# Patient Record
Sex: FEMALE | Race: BLACK OR AFRICAN AMERICAN | NOT HISPANIC OR LATINO | Employment: FULL TIME | URBAN - METROPOLITAN AREA
[De-identification: names, ages, dates, MRNs, and addresses within clinical notes are randomized per-mention and may not be internally consistent; named-entity substitution may affect disease eponyms.]

---

## 2017-02-04 RX ORDER — MELOXICAM 7.5 MG/1
TABLET ORAL
Qty: 60 TABLET | Refills: 0 | Status: SHIPPED | OUTPATIENT
Start: 2017-02-04 | End: 2017-06-01 | Stop reason: SDUPTHER

## 2017-02-04 RX ORDER — CYPROHEPTADINE HYDROCHLORIDE 4 MG/1
TABLET ORAL
Qty: 60 TABLET | Refills: 0 | Status: SHIPPED | OUTPATIENT
Start: 2017-02-04 | End: 2017-09-06 | Stop reason: SDUPTHER

## 2017-02-06 DIAGNOSIS — J31.0 RHINITIS: ICD-10-CM

## 2017-02-06 RX ORDER — AZELASTINE 1 MG/ML
1 SPRAY, METERED NASAL 2 TIMES DAILY
Qty: 30 ML | Refills: 11 | Status: SHIPPED | OUTPATIENT
Start: 2017-02-06 | End: 2020-07-18

## 2017-02-06 RX ORDER — MELOXICAM 7.5 MG/1
TABLET ORAL
Qty: 60 TABLET | Refills: 1 | Status: CANCELLED | OUTPATIENT
Start: 2017-02-06

## 2017-02-06 RX ORDER — CYPROHEPTADINE HYDROCHLORIDE 4 MG/1
4 TABLET ORAL 2 TIMES DAILY PRN
Qty: 60 TABLET | Refills: 2 | Status: CANCELLED | OUTPATIENT
Start: 2017-02-06

## 2017-02-07 DIAGNOSIS — Z12.31 OTHER SCREENING MAMMOGRAM: ICD-10-CM

## 2017-05-29 ENCOUNTER — OFFICE VISIT (OUTPATIENT)
Dept: INTERNAL MEDICINE | Facility: CLINIC | Age: 52
End: 2017-05-29
Payer: COMMERCIAL

## 2017-05-29 ENCOUNTER — HOSPITAL ENCOUNTER (OUTPATIENT)
Dept: RADIOLOGY | Facility: HOSPITAL | Age: 52
Discharge: HOME | End: 2017-05-29
Attending: INTERNAL MEDICINE
Payer: COMMERCIAL

## 2017-05-29 VITALS
WEIGHT: 157 LBS | SYSTOLIC BLOOD PRESSURE: 104 MMHG | DIASTOLIC BLOOD PRESSURE: 80 MMHG | HEIGHT: 60 IN | BODY MASS INDEX: 30.82 KG/M2 | HEART RATE: 69 BPM | OXYGEN SATURATION: 99 %

## 2017-05-29 DIAGNOSIS — M25.562 ACUTE PAIN OF BOTH KNEES: ICD-10-CM

## 2017-05-29 DIAGNOSIS — M79.641 PAIN IN BOTH HANDS: ICD-10-CM

## 2017-05-29 DIAGNOSIS — M79.642 PAIN IN BOTH HANDS: ICD-10-CM

## 2017-05-29 DIAGNOSIS — Z00.00 ANNUAL PHYSICAL EXAM: Primary | ICD-10-CM

## 2017-05-29 DIAGNOSIS — Z12.39 BREAST SCREENING: ICD-10-CM

## 2017-05-29 DIAGNOSIS — M25.521 RIGHT ELBOW PAIN: ICD-10-CM

## 2017-05-29 DIAGNOSIS — M25.561 ACUTE PAIN OF BOTH KNEES: ICD-10-CM

## 2017-05-29 DIAGNOSIS — M25.512 ACUTE PAIN OF LEFT SHOULDER: ICD-10-CM

## 2017-05-29 DIAGNOSIS — G43.909 MIGRAINE WITHOUT STATUS MIGRAINOSUS, NOT INTRACTABLE, UNSPECIFIED MIGRAINE TYPE: ICD-10-CM

## 2017-05-29 PROCEDURE — 73070 X-RAY EXAM OF ELBOW: CPT | Mod: TC,RT

## 2017-05-29 PROCEDURE — 99396 PREV VISIT EST AGE 40-64: CPT | Mod: S$GLB,,, | Performed by: INTERNAL MEDICINE

## 2017-05-29 PROCEDURE — 73564 X-RAY EXAM KNEE 4 OR MORE: CPT | Mod: 50,TC

## 2017-05-29 PROCEDURE — 73130 X-RAY EXAM OF HAND: CPT | Mod: 50,TC

## 2017-05-29 PROCEDURE — 73564 X-RAY EXAM KNEE 4 OR MORE: CPT | Mod: 26,50,, | Performed by: RADIOLOGY

## 2017-05-29 PROCEDURE — 73030 X-RAY EXAM OF SHOULDER: CPT | Mod: TC,LT

## 2017-05-29 PROCEDURE — 73030 X-RAY EXAM OF SHOULDER: CPT | Mod: 26,LT,, | Performed by: RADIOLOGY

## 2017-05-29 PROCEDURE — 99999 PR PBB SHADOW E&M-EST. PATIENT-LVL V: CPT | Mod: PBBFAC,,, | Performed by: INTERNAL MEDICINE

## 2017-05-29 PROCEDURE — 73130 X-RAY EXAM OF HAND: CPT | Mod: 26,50,, | Performed by: RADIOLOGY

## 2017-05-29 PROCEDURE — 73070 X-RAY EXAM OF ELBOW: CPT | Mod: 26,RT,, | Performed by: RADIOLOGY

## 2017-05-29 RX ORDER — SUMATRIPTAN SUCCINATE 100 MG/1
TABLET ORAL
Qty: 9 TABLET | Refills: 5 | Status: SHIPPED | OUTPATIENT
Start: 2017-05-29 | End: 2017-06-06

## 2017-05-29 NOTE — PROGRESS NOTES
Subjective:       Patient ID: Jeannie Saavedra is a 51 y.o. female.    Chief Complaint: Annual Exam    HPI   C/o pain hands, knees, L shoulder, R elbow.  Hands began 1 mo ago, progressive.  Hand pain may awaken her from sleep.  Knee pain intermittent, worse walking up steps, like they might give out.  No swelling.      Recurrent migraines x1 week.  Before 1 week ago, had occurred infrequently.  Some deaths recently.  Had migraine yday.  Goodies and excedrin migraine help a little.  Maxalt ineffective.    Assoc with nausea, light sensitivity.  Not in pain now.  Sleep helps.   Pain ins bifrontal or at temples.  May have scotomata.    Also with bruise inner r thigh.  Review of Systems   Constitutional: Negative for fever and unexpected weight change.   HENT: Negative for congestion and postnasal drip.    Respiratory: Negative for chest tightness, shortness of breath and wheezing.    Cardiovascular: Negative for chest pain and leg swelling.   Gastrointestinal: Negative for abdominal pain, anal bleeding, constipation, diarrhea, nausea and vomiting.   Genitourinary: Negative for dysuria and urgency.   Musculoskeletal: Positive for arthralgias and back pain. Negative for joint swelling.   Skin: Negative for rash.   Neurological: Negative for headaches.   Psychiatric/Behavioral: Negative for dysphoric mood and sleep disturbance. The patient is not nervous/anxious.        Objective:      Physical Exam   Constitutional: She is oriented to person, place, and time. She appears well-developed and well-nourished. No distress.   HENT:   Head: Normocephalic and atraumatic.   Right Ear: External ear normal.   Left Ear: External ear normal.   Nose: Nose normal.   Mouth/Throat: Oropharynx is clear and moist.   Eyes: Conjunctivae and EOM are normal. Pupils are equal, round, and reactive to light. Right eye exhibits no discharge. Left eye exhibits no discharge. No scleral icterus.   Neck: Normal range of motion. Neck supple. No JVD  present. No thyromegaly present.   Cardiovascular: Normal rate, regular rhythm and normal heart sounds.  Exam reveals no gallop.    No murmur heard.  Pulmonary/Chest: Effort normal and breath sounds normal. No respiratory distress. She has no wheezes. She has no rales.   Abdominal: Soft. Bowel sounds are normal. She exhibits no distension and no mass. There is no tenderness. There is no rebound and no guarding.   Genitourinary: No breast tenderness, discharge or bleeding.   Musculoskeletal: Normal range of motion. She exhibits no edema or tenderness.        Right elbow: Normal.       Right knee: Normal.        Left knee: Normal.   Hands - no effusion, warmth, tenderness   Lymphadenopathy:     She has no cervical adenopathy.   Neurological: She is alert and oriented to person, place, and time. No cranial nerve deficit. Coordination normal.   Skin: Skin is warm and dry. No rash noted.   Psychiatric: She has a normal mood and affect. Her behavior is normal. Judgment and thought content normal.         Assessment:       1. Annual physical exam    2. Pain in both hands    3. Acute pain of both knees    4. Migraine without status migrainosus, not intractable, unspecified migraine type    5. Breast screening    6. Acute pain of left shoulder    7. Right elbow pain        Plan:       Jeannie was seen today for annual exam.    Diagnoses and all orders for this visit:    Annual physical exam  -     CBC auto differential; Future  -     Comprehensive metabolic panel; Future  -     Lipid panel; Future    Pain in both hands  -     X-Ray Hand 3 view Left; Future  -     X-Ray Hand 3 view Right; Future  -     Sedimentation rate, manual; Future  -     Rheumatoid factor; Future  -     C-reactive protein; Future  -     BRIGHT; Future    Acute pain of both knees  -     X-Ray Knee Complete 4 Or More Views Bilat; Future    Migraine without status migrainosus, not intractable, unspecified migraine type  -     sumatriptan (IMITREX) 100 MG  tablet; No more than twice in a 24 hour period    Breast screening    Acute pain of left shoulder  -     X-Ray Shoulder Trauma 3 view Left; Future    Right elbow pain  -     X-Ray Elbow 2 Views Right; Future       wt loss     Call if headaches no better with imitrex

## 2017-05-30 ENCOUNTER — PATIENT MESSAGE (OUTPATIENT)
Dept: INTERNAL MEDICINE | Facility: CLINIC | Age: 52
End: 2017-05-30

## 2017-05-31 RX ORDER — NORTRIPTYLINE HYDROCHLORIDE 10 MG/1
CAPSULE ORAL
Qty: 60 CAPSULE | Refills: 3 | Status: SHIPPED | OUTPATIENT
Start: 2017-05-31 | End: 2017-06-27

## 2017-06-01 ENCOUNTER — PATIENT MESSAGE (OUTPATIENT)
Dept: INTERNAL MEDICINE | Facility: CLINIC | Age: 52
End: 2017-06-01

## 2017-06-01 RX ORDER — MELOXICAM 7.5 MG/1
TABLET ORAL
Qty: 60 TABLET | Refills: 5 | Status: SHIPPED | OUTPATIENT
Start: 2017-06-01 | End: 2018-04-23

## 2017-06-02 ENCOUNTER — PATIENT MESSAGE (OUTPATIENT)
Dept: INTERNAL MEDICINE | Facility: CLINIC | Age: 52
End: 2017-06-02

## 2017-06-06 ENCOUNTER — TELEPHONE (OUTPATIENT)
Dept: INTERNAL MEDICINE | Facility: CLINIC | Age: 52
End: 2017-06-06

## 2017-06-06 ENCOUNTER — PATIENT MESSAGE (OUTPATIENT)
Dept: INTERNAL MEDICINE | Facility: CLINIC | Age: 52
End: 2017-06-06

## 2017-06-06 DIAGNOSIS — M25.50 ARTHRALGIA, UNSPECIFIED JOINT: Primary | ICD-10-CM

## 2017-06-06 DIAGNOSIS — M79.642 PAIN IN BOTH HANDS: Primary | ICD-10-CM

## 2017-06-06 DIAGNOSIS — G43.709 CHRONIC MIGRAINE WITHOUT AURA WITHOUT STATUS MIGRAINOSUS, NOT INTRACTABLE: Primary | ICD-10-CM

## 2017-06-06 DIAGNOSIS — M79.641 PAIN IN BOTH HANDS: Primary | ICD-10-CM

## 2017-06-06 RX ORDER — ELETRIPTAN HYDROBROMIDE 40 MG/1
40 TABLET, FILM COATED ORAL
Qty: 9 TABLET | Refills: 2 | Status: SHIPPED | OUTPATIENT
Start: 2017-06-06 | End: 2017-06-07 | Stop reason: SDUPTHER

## 2017-06-06 RX ORDER — ELETRIPTAN HYDROBROMIDE 40 MG/1
40 TABLET, FILM COATED ORAL
Qty: 9 TABLET | Refills: 2 | Status: SHIPPED | OUTPATIENT
Start: 2017-06-06 | End: 2017-06-06 | Stop reason: SDUPTHER

## 2017-06-06 NOTE — TELEPHONE ENCOUNTER
Left the pt a message stating that the provider has called in a Rx to her pharmacy and placed a referral in for neurology and rheumatology along with the numbers to call and schedule her appt.

## 2017-06-06 NOTE — TELEPHONE ENCOUNTER
----- Message from Anne Marie Bang sent at 6/6/2017  4:02 PM CDT -----  Contact: self/[phone]/cell  Patient is returning a phone call.  Who left a message for the patient: Joselo   Does patient know what this is regarding: No   Comments: Please call and advise.         Thank you!!!

## 2017-06-06 NOTE — TELEPHONE ENCOUNTER
pls see the MO message I just sent pt and go over the questions in that message  If nortriptyline not helpful can take topamax as a PREVENTATIVE.    Will martin headley relpax to take for pain when migraines occur    Needs rheum and neuro referrals    pls make sure i have addressed all her concerns

## 2017-06-06 NOTE — TELEPHONE ENCOUNTER
----- Message from Anne Marie Bang sent at 6/6/2017  4:02 PM CDT -----  Contact: self/[phone]/cell  Patient is returning a phone call.  Who left a message for the patient: Joselo   Does patient know what this is regarding: No   Comments: Please call and advise.         Thank you!!!

## 2017-06-07 RX ORDER — ELETRIPTAN HYDROBROMIDE 40 MG/1
TABLET, FILM COATED ORAL
Qty: 9 TABLET | Refills: 2 | Status: SHIPPED | OUTPATIENT
Start: 2017-06-07 | End: 2017-06-30

## 2017-06-07 NOTE — TELEPHONE ENCOUNTER
Discussed with pt:    Joselo Noriega pls make appt in orthoo for hand pain    She will try nortriptyline for hand pain    Will send erx for relpax, as pharmacy didn't receive initial rx.

## 2017-06-07 NOTE — TELEPHONE ENCOUNTER
----- Message from Halley Cross sent at 6/6/2017  7:48 PM CDT -----  Contact: self  Good evening    Pt would like a call back regarding two medications the doctor prescribed for her. Pt stated the medications are not at the pharmacy.     Pt can be reached at [phone]    Thank you

## 2017-06-08 ENCOUNTER — TELEPHONE (OUTPATIENT)
Dept: INTERNAL MEDICINE | Facility: CLINIC | Age: 52
End: 2017-06-08

## 2017-06-08 NOTE — TELEPHONE ENCOUNTER
Left the pt a message stating that a referral for orthopedic has been placed into the system and to call and schedule her appt.

## 2017-06-15 ENCOUNTER — TELEPHONE (OUTPATIENT)
Dept: INTERNAL MEDICINE | Facility: CLINIC | Age: 52
End: 2017-06-15

## 2017-06-27 ENCOUNTER — PATIENT MESSAGE (OUTPATIENT)
Dept: INTERNAL MEDICINE | Facility: CLINIC | Age: 52
End: 2017-06-27

## 2017-06-27 RX ORDER — TOPIRAMATE 25 MG/1
TABLET ORAL
Qty: 60 TABLET | Refills: 11 | Status: SHIPPED | OUTPATIENT
Start: 2017-06-27 | End: 2018-04-23

## 2017-06-30 ENCOUNTER — TELEPHONE (OUTPATIENT)
Dept: INTERNAL MEDICINE | Facility: CLINIC | Age: 52
End: 2017-06-30

## 2017-06-30 RX ORDER — NARATRIPTAN 2.5 MG/1
TABLET ORAL
Qty: 9 TABLET | Refills: 5 | Status: SHIPPED | OUTPATIENT
Start: 2017-06-30 | End: 2018-04-23

## 2017-06-30 NOTE — TELEPHONE ENCOUNTER
pls call - I there is a med called Amerge, in same class as Imitrex, that her insurance will cover, to take to treat acute migraine.    I will send in rx.  Let me know if not helpful.  She may take 1tab at start of migraine, and may repeat once in 2h if necessary.      Also -  wanted her to see neurology but appt never made - pls help her schedule

## 2017-06-30 NOTE — TELEPHONE ENCOUNTER
----- Message from Darlin Lima, PharmD sent at 6/28/2017  9:53 AM CDT -----  Good morning,    Sumatriptan and naratriptan are both $10 under the insurance (for 9 tablets). Zomig and rizatriptan require prior authorization. I hope this helps. Please let me know if you need anything else.    Thanks,  Anuj      ----- Message -----  From: Jannette Salguero MD  Sent: 6/27/2017   4:08 PM  To: Darlin Lima, PharmD, Ambar Tena - could you see if there are any cheaper alternatives for a Triptan than Relpax for this pt. - it costs $100.  Imitrx wasn't helpful.             Thanks   NE -

## 2017-07-05 ENCOUNTER — PATIENT MESSAGE (OUTPATIENT)
Dept: INTERNAL MEDICINE | Facility: CLINIC | Age: 52
End: 2017-07-05

## 2017-08-10 ENCOUNTER — PATIENT MESSAGE (OUTPATIENT)
Dept: INTERNAL MEDICINE | Facility: CLINIC | Age: 52
End: 2017-08-10

## 2017-08-10 RX ORDER — HYDROCODONE BITARTRATE AND ACETAMINOPHEN 5; 325 MG/1; MG/1
TABLET ORAL
Qty: 30 TABLET | Refills: 0 | Status: SHIPPED | OUTPATIENT
Start: 2017-08-10 | End: 2017-10-06 | Stop reason: SDUPTHER

## 2017-08-15 NOTE — TELEPHONE ENCOUNTER
pls call - I sent in rx for Hydrocodone august 10 - I thought I sent her an email but I may have accidentally cancelled it.   Did she  rx?

## 2017-09-05 ENCOUNTER — TELEPHONE (OUTPATIENT)
Dept: RHEUMATOLOGY | Facility: CLINIC | Age: 52
End: 2017-09-05

## 2017-09-06 ENCOUNTER — TELEPHONE (OUTPATIENT)
Dept: RHEUMATOLOGY | Facility: CLINIC | Age: 52
End: 2017-09-06

## 2017-09-06 ENCOUNTER — PATIENT MESSAGE (OUTPATIENT)
Dept: RHEUMATOLOGY | Facility: CLINIC | Age: 52
End: 2017-09-06

## 2017-09-06 ENCOUNTER — LAB VISIT (OUTPATIENT)
Dept: LAB | Facility: HOSPITAL | Age: 52
End: 2017-09-06
Attending: INTERNAL MEDICINE
Payer: COMMERCIAL

## 2017-09-06 ENCOUNTER — INITIAL CONSULT (OUTPATIENT)
Dept: RHEUMATOLOGY | Facility: CLINIC | Age: 52
End: 2017-09-06
Payer: COMMERCIAL

## 2017-09-06 VITALS
DIASTOLIC BLOOD PRESSURE: 74 MMHG | SYSTOLIC BLOOD PRESSURE: 120 MMHG | HEIGHT: 60 IN | WEIGHT: 163.56 LBS | HEART RATE: 70 BPM | RESPIRATION RATE: 18 BRPM | BODY MASS INDEX: 32.11 KG/M2

## 2017-09-06 DIAGNOSIS — M25.50 POLYARTHRALGIA: ICD-10-CM

## 2017-09-06 DIAGNOSIS — M25.50 POLYARTHRALGIA: Primary | ICD-10-CM

## 2017-09-06 DIAGNOSIS — R22.31 LOCALIZED SWELLING ON RIGHT HAND: ICD-10-CM

## 2017-09-06 LAB
BASOPHILS # BLD AUTO: 0.01 K/UL
BASOPHILS NFR BLD: 0.2 %
CRP SERPL-MCNC: 2.9 MG/L
DIFFERENTIAL METHOD: ABNORMAL
EOSINOPHIL # BLD AUTO: 0.1 K/UL
EOSINOPHIL NFR BLD: 1.2 %
ERYTHROCYTE [DISTWIDTH] IN BLOOD BY AUTOMATED COUNT: 13.2 %
ERYTHROCYTE [SEDIMENTATION RATE] IN BLOOD BY WESTERGREN METHOD: 13 MM/HR
HCT VFR BLD AUTO: 36.1 %
HGB BLD-MCNC: 11.8 G/DL
LYMPHOCYTES # BLD AUTO: 1.9 K/UL
LYMPHOCYTES NFR BLD: 40.1 %
MCH RBC QN AUTO: 28.9 PG
MCHC RBC AUTO-ENTMCNC: 32.7 G/DL
MCV RBC AUTO: 88 FL
MONOCYTES # BLD AUTO: 0.4 K/UL
MONOCYTES NFR BLD: 7.7 %
NEUTROPHILS # BLD AUTO: 2.4 K/UL
NEUTROPHILS NFR BLD: 50.8 %
PLATELET # BLD AUTO: 302 K/UL
PMV BLD AUTO: 9.3 FL
RBC # BLD AUTO: 4.09 M/UL
URATE SERPL-MCNC: 2.6 MG/DL
WBC # BLD AUTO: 4.81 K/UL

## 2017-09-06 PROCEDURE — 85652 RBC SED RATE AUTOMATED: CPT

## 2017-09-06 PROCEDURE — 3008F BODY MASS INDEX DOCD: CPT | Mod: S$GLB,,, | Performed by: INTERNAL MEDICINE

## 2017-09-06 PROCEDURE — 86803 HEPATITIS C AB TEST: CPT

## 2017-09-06 PROCEDURE — 86706 HEP B SURFACE ANTIBODY: CPT

## 2017-09-06 PROCEDURE — 86140 C-REACTIVE PROTEIN: CPT

## 2017-09-06 PROCEDURE — 87340 HEPATITIS B SURFACE AG IA: CPT

## 2017-09-06 PROCEDURE — 36415 COLL VENOUS BLD VENIPUNCTURE: CPT

## 2017-09-06 PROCEDURE — 86200 CCP ANTIBODY: CPT

## 2017-09-06 PROCEDURE — 86705 HEP B CORE ANTIBODY IGM: CPT

## 2017-09-06 PROCEDURE — 85025 COMPLETE CBC W/AUTO DIFF WBC: CPT

## 2017-09-06 PROCEDURE — 99999 PR PBB SHADOW E&M-EST. PATIENT-LVL III: CPT | Mod: PBBFAC,,, | Performed by: INTERNAL MEDICINE

## 2017-09-06 PROCEDURE — 99204 OFFICE O/P NEW MOD 45 MIN: CPT | Mod: S$GLB,,, | Performed by: INTERNAL MEDICINE

## 2017-09-06 PROCEDURE — 86038 ANTINUCLEAR ANTIBODIES: CPT

## 2017-09-06 PROCEDURE — 84550 ASSAY OF BLOOD/URIC ACID: CPT

## 2017-09-06 RX ORDER — CYPROHEPTADINE HYDROCHLORIDE 4 MG/1
TABLET ORAL
Qty: 60 TABLET | Refills: 0 | Status: SHIPPED | OUTPATIENT
Start: 2017-09-06 | End: 2018-04-23 | Stop reason: SDUPTHER

## 2017-09-06 NOTE — PROGRESS NOTES
Subjective:       Patient ID: Jeannie Saavedra is a 51 y.o. female.    Chief Complaint: Disease Management    HPI  52 yo F PMH of asthma (mild), right CTS here for evaluations. Reports that she has pain in right hand, both knees.  Reports pain since may 2017. Thinks that her right hand may be getting swollen.  Reports that when she tries to open a jar. She has pain.  Reports that her right third finger may be getting swollen.  Reports that her pain is aching.  She also has numbness in right hand.  Reports that she has stiffness in right hand and left knee. Reports stiffness with prolonged sitting.  Pain level can be as high as 10/10 and aching.  Denies any history of smoking.  Denies any oral ulcers, hair loss, photosensitivity, or raynauds.  She had IM shot about 2 months ago and took prednisone pills and it took edge off.  She had steroid injection for CTS without significant improvement and that her CTS is moderate to severe.    Past Medical History:   Diagnosis Date    Allergy     Asthma     Keloid cicatrix     Nephrolithiasis     Osteoarthritis of lumbar spine     Sciatica        Review of Systems   Constitutional: Negative for activity change, appetite change, chills, diaphoresis and fatigue.   HENT: Negative for congestion, ear discharge, ear pain, facial swelling, mouth sores, sinus pressure, sneezing, sore throat, tinnitus and trouble swallowing.    Eyes: Negative for photophobia, pain, discharge, redness, itching and visual disturbance.   Respiratory: Negative for apnea, chest tightness, shortness of breath, wheezing and stridor.    Cardiovascular: Negative for leg swelling.   Gastrointestinal: Negative for abdominal distention, abdominal pain, anal bleeding, blood in stool, constipation, diarrhea and nausea.   Endocrine: Negative for cold intolerance and heat intolerance.   Genitourinary: Negative for difficulty urinating and dysuria.   Musculoskeletal: Positive for arthralgias and joint swelling.  Negative for back pain, gait problem, myalgias, neck pain and neck stiffness.   Skin: Negative for color change, pallor, rash and wound.   Neurological: Negative for dizziness, seizures, light-headedness and numbness.   Hematological: Negative for adenopathy. Does not bruise/bleed easily.   Psychiatric/Behavioral: Negative for sleep disturbance. The patient is not nervous/anxious.            Objective:   /74   Pulse 70   Resp 18   Ht 5' (1.524 m)   Wt 74.2 kg (163 lb 9.3 oz)   BMI 31.95 kg/m²      Physical Exam   Constitutional: She is oriented to person, place, and time.   HENT:   Head: Normocephalic and atraumatic.   Right Ear: External ear normal.   Left Ear: External ear normal.   Nose: Nose normal.   Mouth/Throat: Oropharynx is clear and moist. No oropharyngeal exudate.   Eyes: Conjunctivae and EOM are normal. Pupils are equal, round, and reactive to light. Right eye exhibits no discharge. Left eye exhibits no discharge. No scleral icterus.   Neck: Neck supple. No JVD present. No thyromegaly present.   Cardiovascular: Normal rate, regular rhythm, normal heart sounds and intact distal pulses.  Exam reveals no gallop and no friction rub.    No murmur heard.  Pulmonary/Chest: Effort normal and breath sounds normal. No respiratory distress. She has no wheezes. She has no rales. She exhibits no tenderness.   Abdominal: Soft. Bowel sounds are normal. She exhibits no distension and no mass. There is no tenderness. There is no rebound and no guarding.   Lymphadenopathy:     She has no cervical adenopathy.   Neurological: She is alert and oriented to person, place, and time. No cranial nerve deficit. Gait normal. Coordination normal.   Skin: Skin is dry. No rash noted. No erythema. No pallor.     Psychiatric: Affect and judgment normal.   Musculoskeletal: She exhibits tenderness. She exhibits no edema or deformity.   Right hand  mcp tenderness  Left knee with mild  Tenderness on extension           Labs:  reviewed    Hand xrays: I personally reviewed; no erosions  Assessment:       52 yo F PMH of asthma (mild), right CTS here for evaluation. Reports that she has pain in right hand, both knees.  I suspect that she may have gout vs RA but will do additional studies for confirmation.    No diagnosis found.        Plan:        right hand MRI  Labs  Encouraged weight loss  rtc in 10-12 weeks*

## 2017-09-06 NOTE — LETTER
September 6, 2017      Jannette Salguero MD  [address]  [facility] [address]           [facility]  [address] [place] [address] [address]  [facility] [address]  Phone: [phone]          Patient: Jeannie Saavedra   MR Number: [id number]   YOB: 1965   Date of Visit: 9/6/2017       Dear Dr. Jannette Salguero:    Thank you for referring Jeannie Saavedra to me for evaluation. Attached you will find relevant portions of my assessment and plan of care.    If you have questions, please do not hesitate to call me. I look forward to following Jeannie Saavedra along with you.    Sincerely,    Anne Marie Haynes MD    Enclosure  CC:  No Recipients    If you would like to receive this communication electronically, please contact [email or web address] or [phone] to request more information on [employer] Link access.    For providers and/or their staff who would like to refer a patient to [facility], please contact us through our one-stop-shop provider referral line, [facility], at [phone].    If you feel you have received this communication in error or would no longer like to receive these types of communications, please e-mail [email or web address]

## 2017-09-07 ENCOUNTER — TELEPHONE (OUTPATIENT)
Dept: RHEUMATOLOGY | Facility: CLINIC | Age: 52
End: 2017-09-07

## 2017-09-07 DIAGNOSIS — R22.31 LOCALIZED SWELLING ON RIGHT HAND: Primary | ICD-10-CM

## 2017-09-07 LAB
ANA SER QL IF: NORMAL
CCP AB SER IA-ACNC: 0.6 U/ML
HBV CORE IGM SERPL QL IA: NEGATIVE
HBV SURFACE AB SER-ACNC: NEGATIVE M[IU]/ML
HBV SURFACE AG SERPL QL IA: NEGATIVE
HCV AB SERPL QL IA: NEGATIVE

## 2017-09-07 RX ORDER — PREDNISONE 20 MG/1
20 TABLET ORAL DAILY
Qty: 10 TABLET | Refills: 0 | Status: SHIPPED | OUTPATIENT
Start: 2017-09-07 | End: 2017-09-17

## 2017-09-08 NOTE — TELEPHONE ENCOUNTER
----- Message from Carmen Acosta MA sent at 9/7/2017 11:15 AM CDT -----  Contact: Self [phone]  [facility]  ----- Message -----  From: Amber Herr  Sent: 9/7/2017   8:54 AM  To: Dallas Kenney Staff    Patient Returning Your Phone Call

## 2017-09-12 ENCOUNTER — TELEPHONE (OUTPATIENT)
Dept: RHEUMATOLOGY | Facility: CLINIC | Age: 52
End: 2017-09-12

## 2017-09-18 ENCOUNTER — TELEPHONE (OUTPATIENT)
Dept: RHEUMATOLOGY | Facility: CLINIC | Age: 52
End: 2017-09-18

## 2017-09-18 DIAGNOSIS — R22.31 LOCALIZED SWELLING ON RIGHT HAND: Primary | ICD-10-CM

## 2017-09-25 ENCOUNTER — PATIENT MESSAGE (OUTPATIENT)
Dept: RHEUMATOLOGY | Facility: CLINIC | Age: 52
End: 2017-09-25

## 2017-09-25 DIAGNOSIS — M25.562 LEFT ANTERIOR KNEE PAIN: Primary | ICD-10-CM

## 2017-10-06 ENCOUNTER — OFFICE VISIT (OUTPATIENT)
Dept: ORTHOPEDICS | Facility: CLINIC | Age: 52
End: 2017-10-06
Payer: COMMERCIAL

## 2017-10-06 ENCOUNTER — HOSPITAL ENCOUNTER (OUTPATIENT)
Dept: RADIOLOGY | Facility: HOSPITAL | Age: 52
Discharge: HOME | End: 2017-10-06
Attending: NURSE PRACTITIONER
Payer: COMMERCIAL

## 2017-10-06 VITALS — HEIGHT: 60 IN | BODY MASS INDEX: 32.11 KG/M2 | WEIGHT: 163.56 LBS

## 2017-10-06 DIAGNOSIS — M25.562 LEFT KNEE PAIN, UNSPECIFIED CHRONICITY: Primary | ICD-10-CM

## 2017-10-06 DIAGNOSIS — M25.562 ACUTE PAIN OF LEFT KNEE: Primary | ICD-10-CM

## 2017-10-06 DIAGNOSIS — M25.562 LEFT KNEE PAIN, UNSPECIFIED CHRONICITY: ICD-10-CM

## 2017-10-06 PROCEDURE — 99999 PR PBB SHADOW E&M-EST. PATIENT-LVL III: CPT | Mod: PBBFAC,,, | Performed by: NURSE PRACTITIONER

## 2017-10-06 PROCEDURE — 99203 OFFICE O/P NEW LOW 30 MIN: CPT | Mod: S$GLB,,, | Performed by: NURSE PRACTITIONER

## 2017-10-06 RX ORDER — HYDROCODONE BITARTRATE AND ACETAMINOPHEN 5; 325 MG/1; MG/1
TABLET ORAL
Qty: 30 TABLET | Refills: 0 | Status: SHIPPED | OUTPATIENT
Start: 2017-10-06 | End: 2018-04-23

## 2017-10-06 NOTE — LETTER
October 8, 2017      Anne Marie Haynes MD  [address]  [facility] [address]           [facility]  [address]  [facility] [address]  Phone: [phone]          Patient: Jeannie Saavedra   MR Number: [id number]   YOB: 1965   Date of Visit: 10/6/2017       Dear Dr. Anne Marie Haynes:    Thank you for referring Jeannie Saavedra to me for evaluation. Attached you will find relevant portions of my assessment and plan of care.    If you have questions, please do not hesitate to call me. I look forward to following Jeannie Saavedra along with you.    Sincerely,    Paola Field NP    Enclosure  CC:  No Recipients    If you would like to receive this communication electronically, please contact [email or web address] or [phone] to request more information on [employer] Link access.    For providers and/or their staff who would like to refer a patient to [facility], please contact us through our one-stop-shop provider referral line, [facility] , at [phone].    If you feel you have received this communication in error or would no longer like to receive these types of communications, please e-mail [email or web address]

## 2017-10-08 NOTE — PROGRESS NOTES
CC: Pain of the Left Knee      HPI: Pt with c/o bilateral knee pain left>right for the past several months. The pain started after she started exercising. The pain is worse with bending her knee in bed at night. There is stiffness and pulling. 3 months ago she went to urgent care and was given a cortisone injection in the knee. The relief only lasted for few days. She has also tried aleve, advil, mobic, naprosyn 500, and Norco without relief. She was seen by her pcp and sent to rheumatology for polyarthralgia, but since all of her bloodwork was negative for rheumatologic cause she was referred to orthopedics. She also c/o carpal tunnel/hand pain for which she was referred to the hand clinic, but she hasn't yet gotten an appt. She is ambulating without assistive device. There is not a limp.    ROS  General: denies fever and chills  Resp: no c/o sob  CVS: no c/o cp  MSK: c/o bilateral knee pain left>right which is medial and aching and worse at night with pulling and locking    PE  General: AAOx3, pleasant and cooperative  Resp: respirations even and unlabored  MSK: left knee exam  0 degrees extension  120 degrees flexion  No warmth or erythema   - effusion  Left knee caitie + medial  Right knee caitie -     Xray:  Reviewed by me: Right knee: There are no osseous or articular abnormalities.  No joint effusion or osteoblastic or lytic lesions.  Soft tissues are unremarkable.    Left knee: There are no osseous or articular abnormalities.  No fractures or dislocations are osteoblastic or lytic lesions    Assessment:  Bilateral knee pain    Plan:  MRI for further evaluation in light of normal xray and lack of relief with cortisone injection and multiple nsaids with + caitie  RICE  F/u results by phone  Appt made with the hand clinic in November. Since the patient has been waiting so long, a message was sent to Joyce Pierre and she is going to try to get her a sooner appt  Will call with sooner appt at [phone]

## 2017-10-10 ENCOUNTER — TELEPHONE (OUTPATIENT)
Dept: ORTHOPEDICS | Facility: CLINIC | Age: 52
End: 2017-10-10

## 2017-10-10 ENCOUNTER — PATIENT MESSAGE (OUTPATIENT)
Dept: ORTHOPEDICS | Facility: CLINIC | Age: 52
End: 2017-10-10

## 2017-10-10 NOTE — TELEPHONE ENCOUNTER
----- Message from Megan Cortes sent at 10/10/2017  4:52 PM CDT -----  Contact: Patient [phone]  Patient had to cancel her appt for tomorrow but is requesting a call from you.    Please call and advise.    Thank You

## 2017-10-12 ENCOUNTER — PATIENT MESSAGE (OUTPATIENT)
Dept: ORTHOPEDICS | Facility: CLINIC | Age: 52
End: 2017-10-12

## 2017-11-17 DIAGNOSIS — Z12.39 SCREENING BREAST EXAMINATION: Primary | ICD-10-CM

## 2017-11-20 ENCOUNTER — OFFICE VISIT (OUTPATIENT)
Dept: ORTHOPEDICS | Facility: CLINIC | Age: 52
End: 2017-11-20
Payer: COMMERCIAL

## 2017-11-20 VITALS
HEART RATE: 80 BPM | WEIGHT: 163 LBS | HEIGHT: 60 IN | SYSTOLIC BLOOD PRESSURE: 124 MMHG | DIASTOLIC BLOOD PRESSURE: 80 MMHG | BODY MASS INDEX: 32 KG/M2

## 2017-11-20 DIAGNOSIS — M79.641 PAIN OF RIGHT HAND: Primary | ICD-10-CM

## 2017-11-20 PROCEDURE — 99213 OFFICE O/P EST LOW 20 MIN: CPT | Mod: S$GLB,,, | Performed by: PHYSICIAN ASSISTANT

## 2017-11-20 PROCEDURE — 99999 PR PBB SHADOW E&M-EST. PATIENT-LVL III: CPT | Mod: PBBFAC,,, | Performed by: PHYSICIAN ASSISTANT

## 2017-11-20 NOTE — PROGRESS NOTES
"Subjective:      Patient ID: Jeannie Saavedra is a 51 y.o. female.    Chief Complaint: Pain of the Right Hand      HPI  Jeannie Saavedra is a right hand dominant 51 y.o. female presenting today for right hand pain.  There was not a history of trauma.  Onset of symptoms began in 5/2017.  She reports aching pain in the dorsal right hand near the MCPs.  She denies any wrist or finger pain.  She reports a history of finger numbness and tingling, resolved after carpal tunnel "wrist injection" in 8/2017 by Dr. Ponce.  She reports prior nerve test showing Carpal Tunnel Syndrome righ UE.  She denies any current finger numbness or tingling. She has increased hand pain when she makes a fist, or tries to open a jar/bottle. She has moments of no pain, pain at the worst is 10/10 when trying to open a jar. Currently pain is 7/10.  She reports that today she has been using her phone and opening/closing doors with her right hand.   She denies any hand weakness.       Review of patient's allergies indicates:  No Known Allergies      Current Outpatient Prescriptions   Medication Sig Dispense Refill    azelastine (ASTELIN) 137 mcg (0.1 %) nasal spray 1 spray (137 mcg total) by Nasal route 2 (two) times daily. 30 mL 11    cyproheptadine (PERIACTIN) 4 mg tablet TAKE ONE TABLET BY MOUTH TWICE DAILY AS NEEDED 60 tablet 0    hydrocodone-acetaminophen 5-325mg (NORCO) 5-325 mg per tablet 1-2 tabs po q 6-8 h prn pain 30 tablet 0    meloxicam (MOBIC) 7.5 MG tablet 1-2 tabs po q day 60 tablet 5    naratriptan (AMERGE) 2.5 MG tablet 2.5 mg at onset of headache, may repeat in 4 hours if needed 9 tablet 5    PROCTOSOL HC 2.5 % rectal cream APPLY CREAM RECTALLY TWICE DAILY 1 Tube 1    topiramate (TOPAMAX) 25 MG tablet 1-2 tabs po qhs to prevent migraines 60 tablet 11     No current facility-administered medications for this visit.        Past Medical History:   Diagnosis Date    Allergy     Asthma     Keloid cicatrix     Nephrolithiasis  "    Osteoarthritis of lumbar spine     Sciatica        Past Surgical History:   Procedure Laterality Date     SECTION      HYSTERECTOMY      for hypermenorrhea         Review of Systems:  Review of Systems   Constitution: Negative for chills and fever.   Skin: Negative for rash and suspicious lesions.   Musculoskeletal:        See HPI   Neurological: Negative for dizziness, headaches, light-headedness, numbness and paresthesias.   Psychiatric/Behavioral: Negative for depression. The patient is not nervous/anxious.          OBJECTIVE:     PHYSICAL EXAM:  Height: 5' (152.4 cm) Weight: 73.9 kg (163 lb)     Vitals:    17 1415   BP: 124/80   Pulse: 80     General    Vitals reviewed.  Constitutional: She is oriented to person, place, and time. She appears well-developed and well-nourished.   HENT:   Head: Normocephalic and atraumatic.   Neck: Normal range of motion.   Cardiovascular: Normal rate.    Pulmonary/Chest: Effort normal. No respiratory distress.   Neurological: She is alert and oriented to person, place, and time.   Psychiatric: She has a normal mood and affect. Her behavior is normal. Judgment and thought content normal.             Musculoskeletal: No scars noted. No edema.  Tender to palpation at the MCP/metacarpal heads of metacarpals 2-5 on the right. Good ROM of the fingers and wrist. No tenderness at the A1 pulleys.  No sagittal band disruption appreciated.  NVI - good sensation and motor function, good capillary refill.  Negative Tinels and Durkins.     RADIOGRAPHS:  Bilateral Hand X-Ray, 2017  No significant alignment abnormality, and the joint spaces appear adequately preserved.  Osseous structures demonstrate no evidence of recent or healing fracture, lytic destructive process, periarticular erosive change, or other significant abnormality.   Impression      As above.       Comments: I have personally reviewed the imaging and I agree with the above radiologist's  report.    ASSESSMENT/PLAN:   Jeannie was seen today for pain.    Diagnoses and all orders for this visit:    Pain of right hand  -     Ambulatory Referral to Physical/Occupational Therapy           - We talked at length about the anatomy and pathophysiology of   Encounter Diagnosis   Name Primary?    Pain of right hand Yes     - Continue use of carpal tunnel brace  - Compound cream ordered  - Orders placed for OT  - Follow up in 6-8 weeks

## 2017-12-08 ENCOUNTER — HOSPITAL ENCOUNTER (OUTPATIENT)
Dept: RADIOLOGY | Facility: HOSPITAL | Age: 52
Discharge: HOME | End: 2017-12-08
Attending: INTERNAL MEDICINE
Payer: COMMERCIAL

## 2017-12-08 VITALS — BODY MASS INDEX: 32 KG/M2 | HEIGHT: 60 IN | WEIGHT: 163 LBS

## 2017-12-08 DIAGNOSIS — Z12.31 VISIT FOR SCREENING MAMMOGRAM: ICD-10-CM

## 2017-12-08 DIAGNOSIS — Z12.31 VISIT FOR SCREENING MAMMOGRAM: Primary | ICD-10-CM

## 2017-12-08 DIAGNOSIS — Z12.31 SCREENING MAMMOGRAM, ENCOUNTER FOR: ICD-10-CM

## 2017-12-08 DIAGNOSIS — Z12.39 SCREENING BREAST EXAMINATION: ICD-10-CM

## 2017-12-08 PROCEDURE — 77067 SCR MAMMO BI INCL CAD: CPT | Mod: TC

## 2017-12-08 PROCEDURE — 77067 SCR MAMMO BI INCL CAD: CPT | Mod: 26,,, | Performed by: RADIOLOGY

## 2017-12-08 PROCEDURE — 77063 BREAST TOMOSYNTHESIS BI: CPT | Mod: 26,,, | Performed by: RADIOLOGY

## 2017-12-12 RX ORDER — HYDROCORTISONE 25 MG/G
CREAM TOPICAL 2 TIMES DAILY
Qty: 29 G | Refills: 0 | Status: SHIPPED | OUTPATIENT
Start: 2017-12-12 | End: 2017-12-22

## 2018-02-22 ENCOUNTER — TELEPHONE (OUTPATIENT)
Dept: INTERNAL MEDICINE | Facility: CLINIC | Age: 53
End: 2018-02-22

## 2018-02-22 RX ORDER — CIPROFLOXACIN 250 MG/1
250 TABLET, FILM COATED ORAL EVERY 12 HOURS
Qty: 10 TABLET | Refills: 0 | Status: SHIPPED | OUTPATIENT
Start: 2018-02-22 | End: 2018-04-23

## 2018-02-22 NOTE — TELEPHONE ENCOUNTER
Called pt to advise. Pt thinks she has UTI. She went to urgent care (not [facility]) but cant recall which one. She tested positive for the flu, and they wanted her to follow up with her PCP in three days.     Pt stated you are aware of her frequent UTI's but has not been in to see you in 6 months. She requested to speak to you or mychal since I only requested her an urgent care appointment. Is there a day or time you can get her in and I will schedule?

## 2018-02-22 NOTE — TELEPHONE ENCOUNTER
Will call in cipro for uti (Julius has spoken to her and symptoms c/w UTI)   You may add her 10 am on Tuesday if she wants appt to f/u on fly symptoms.  (only if she wants to come in)

## 2018-02-22 NOTE — TELEPHONE ENCOUNTER
----- Message from Andrae Gonzalez sent at 2/21/2018 12:03 PM CST -----  Contact: self [phone]  Patient requesting a call back from the office to go over her health . Please advise, Thanks

## 2018-02-26 ENCOUNTER — TELEPHONE (OUTPATIENT)
Dept: INTERNAL MEDICINE | Facility: CLINIC | Age: 53
End: 2018-02-26

## 2018-02-26 NOTE — TELEPHONE ENCOUNTER
----- Message from Aleisha Dueñas sent at 2/26/2018  1:45 PM CST -----  Contact: Patient [phone]  She would like a call back to schedule a follow up appointment from the flu per Dr Salguero. The best time to call is between now and 3:30 or after 4:15 Monday thru Thursday.    Thank you

## 2018-04-20 ENCOUNTER — TELEPHONE (OUTPATIENT)
Dept: INTERNAL MEDICINE | Facility: CLINIC | Age: 53
End: 2018-04-20

## 2018-04-20 NOTE — TELEPHONE ENCOUNTER
----- Message from Marci Segundo MA sent at 4/20/2018 11:36 AM CDT -----  Contact: Patient [phone]      ----- Message -----  From: Pratibha Moise  Sent: 4/20/2018  11:32 AM  To: Noemy HANLEY Staff    Sooner appointment than the  can schedule.  Did you offer to schedule the next available appointment and put the patient on the wait list?:    When is the first available appointment: 06/07/2018  What is the nature of the appointment: back pain    Please call and advise.    Thank You

## 2018-04-23 ENCOUNTER — TELEPHONE (OUTPATIENT)
Dept: INTERNAL MEDICINE | Facility: CLINIC | Age: 53
End: 2018-04-23

## 2018-04-23 ENCOUNTER — OFFICE VISIT (OUTPATIENT)
Dept: INTERNAL MEDICINE | Facility: CLINIC | Age: 53
End: 2018-04-23
Payer: COMMERCIAL

## 2018-04-23 VITALS
HEART RATE: 70 BPM | WEIGHT: 158.63 LBS | SYSTOLIC BLOOD PRESSURE: 122 MMHG | TEMPERATURE: 98 F | DIASTOLIC BLOOD PRESSURE: 76 MMHG | BODY MASS INDEX: 31.14 KG/M2 | OXYGEN SATURATION: 98 % | HEIGHT: 60 IN

## 2018-04-23 DIAGNOSIS — J06.9 VIRAL URI WITH COUGH: ICD-10-CM

## 2018-04-23 DIAGNOSIS — M54.42 CHRONIC LEFT-SIDED LOW BACK PAIN WITH LEFT-SIDED SCIATICA: Primary | ICD-10-CM

## 2018-04-23 DIAGNOSIS — G89.29 CHRONIC LEFT-SIDED LOW BACK PAIN WITH LEFT-SIDED SCIATICA: Primary | ICD-10-CM

## 2018-04-23 PROCEDURE — 99214 OFFICE O/P EST MOD 30 MIN: CPT | Mod: S$GLB,,, | Performed by: INTERNAL MEDICINE

## 2018-04-23 PROCEDURE — 99999 PR PBB SHADOW E&M-EST. PATIENT-LVL III: CPT | Mod: PBBFAC,,, | Performed by: INTERNAL MEDICINE

## 2018-04-23 RX ORDER — CYPROHEPTADINE HYDROCHLORIDE 4 MG/1
4 TABLET ORAL 2 TIMES DAILY PRN
Qty: 60 TABLET | Refills: 1 | Status: SHIPPED | OUTPATIENT
Start: 2018-04-23 | End: 2018-10-08 | Stop reason: SDUPTHER

## 2018-04-23 RX ORDER — MELOXICAM 15 MG/1
15 TABLET ORAL DAILY
Qty: 30 TABLET | Refills: 2 | Status: SHIPPED | OUTPATIENT
Start: 2018-04-23 | End: 2018-05-17

## 2018-04-23 RX ORDER — GABAPENTIN 100 MG/1
CAPSULE ORAL
Qty: 90 CAPSULE | Refills: 0
Start: 2018-04-23 | End: 2018-05-17

## 2018-04-23 RX ORDER — PROMETHAZINE HYDROCHLORIDE AND CODEINE PHOSPHATE 6.25; 1 MG/5ML; MG/5ML
5 SOLUTION ORAL EVERY 4 HOURS PRN
Qty: 118 ML | Refills: 0 | Status: SHIPPED | OUTPATIENT
Start: 2018-04-23 | End: 2018-05-03

## 2018-04-23 NOTE — TELEPHONE ENCOUNTER
----- Message from Aleisha Dueñas sent at 4/20/2018  4:13 PM CDT -----  Contact: Patient [phone]  Patient is returning a phone call.  Who left a message for the patient: She don't know  Does patient know what this is regarding:  Yes   Comments: She's hoping to get a sooner appointment

## 2018-04-23 NOTE — PROGRESS NOTES
Subjective:       Patient ID: Jeannie Saavedra is a 52 y.o. female.    Chief Complaint: Back Pain (lower left side level-8) and Follow-up (allergies)    HPI   C/o had congestion, spitting up green mucus.    Began Thursday.  Tried astelin, generic zyrtec, robitussin DM.      Hoarse, now better.  Cough keeps her up at night.  Tried benadry.  Post nasal drip.       No fever.  No facial pain.  Periactin helps,  At bid.    C/o pain L lower back, down back of leg.  Intermittent x years.  Mild tingling of leg - tried Biofreeze.  She has tried ibuprofen, aleve, but no help.  Gabapentin not helpful.  Worsened Friday.  In bed all weekend.  Now 8/10.  Some tightness lower back.  Back hurts all the time.            Review of Systems   Constitutional: Negative for fever and unexpected weight change.   HENT: Negative for congestion and postnasal drip.    Eyes: Negative for pain, discharge and visual disturbance.   Respiratory: Negative for cough, chest tightness, shortness of breath and wheezing.    Cardiovascular: Negative for chest pain and leg swelling.   Gastrointestinal: Negative for abdominal pain, constipation, diarrhea and nausea.   Genitourinary: Negative for difficulty urinating, dysuria and hematuria.   Musculoskeletal: Positive for back pain.   Skin: Negative for rash.   Neurological: Positive for numbness. Negative for headaches.   Psychiatric/Behavioral: Negative for dysphoric mood and sleep disturbance. The patient is not nervous/anxious.        Objective:      Physical Exam   Constitutional: She is oriented to person, place, and time. She appears well-developed and well-nourished. No distress.   HENT:   Head: Normocephalic and atraumatic.   Mouth/Throat: Oropharynx is clear and moist. No oropharyngeal exudate.   Tm's clear   Neck: Neck supple.   Cardiovascular: Normal rate and regular rhythm.    Pulmonary/Chest: Effort normal and breath sounds normal. No respiratory distress. She has no wheezes. She has no rales.    Musculoskeletal: She exhibits no edema.   Lymphadenopathy:     She has no cervical adenopathy.   Neurological: She is alert and oriented to person, place, and time. She has normal strength.   Reflex Scores:       Patellar reflexes are 2+ on the right side and 2+ on the left side.       Achilles reflexes are 2+ on the right side and 0 on the left side.  Psychiatric: She has a normal mood and affect. Her behavior is normal.       Assessment:       1. Chronic left-sided low back pain with left-sided sciatica    2. Viral URI with cough        Plan:       Jeannie was seen today for back pain and follow-up.    Diagnoses and all orders for this visit:    Chronic left-sided low back pain with left-sided sciatica  -     Ambulatory consult to [facility]    Viral URI with cough    Other orders  -     meloxicam (MOBIC) 15 MG tablet; Take 1 tablet (15 mg total) by mouth once daily.  -     gabapentin (NEURONTIN) 100 MG capsule; 1-3 tabs at bedtime for sciatica  -     cyproheptadine (PERIACTIN) 4 mg tablet; Take 1 tablet (4 mg total) by mouth 2 (two) times daily as needed.  -     promethazine-codeine 6.25-10 mg/5 ml (PHENERGAN WITH CODEINE) 6.25-10 mg/5 mL syrup; Take 5 mLs by mouth every 4 (four) hours as needed.

## 2018-04-30 ENCOUNTER — TELEPHONE (OUTPATIENT)
Dept: INTERNAL MEDICINE | Facility: CLINIC | Age: 53
End: 2018-04-30

## 2018-04-30 NOTE — TELEPHONE ENCOUNTER
----- Message from Anne Marie Bang sent at 4/30/2018 10:15 AM CDT -----  Contact: self/[phone]  Patient called in regards needing to talk with Dr Salguero about if she can get the phone number for the [facility] center(patient stated that Dr knows what she is talking about) . Please call and advise. Thank  you!!!

## 2018-05-07 DIAGNOSIS — Z12.11 COLON CANCER SCREENING: ICD-10-CM

## 2018-05-11 ENCOUNTER — PATIENT MESSAGE (OUTPATIENT)
Dept: INTERNAL MEDICINE | Facility: CLINIC | Age: 53
End: 2018-05-11

## 2018-05-16 NOTE — PROGRESS NOTES
CRS Office Visit History and Physical    Referring Md:   Aaareferral Self  No address on file    SUBJECTIVE:     Chief Complaint: hemorrhoids    History of Present Illness:  Patient is a 52 y.o. female presents with hemorrhoids. The patient is a new patient to this practice.   Course is as follows:  52-year-old woman with long-standing history of hemorrhoids since  (childbirth).  These are mostly been related to bleeding episodes with constipation.  She suffers from constipation approximate 2 times every 6 months.  With constipation, she has bleeding with bowel movements as well as pain with bowel movements.  No mass or protrusion.  Normally she has 1 bowel movement per day and denies any prolonged amount of time on the toilet area no family history of colorectal cancer.  Did have a colonoscopy multiple years ago but does not recall the results.  Mother had multiple polyps on her colonoscopy but no family history of cancer.  Recently, she had diarrhea area following this, she developed a painful pea-sized mass next to the anal canal.  This prompted her visit today.  Last Colonoscopy: Over 10 years ago    Review of patient's allergies indicates:  No Known Allergies    Past Medical History:   Diagnosis Date    Allergy     Asthma     Keloid cicatrix     Nephrolithiasis     Osteoarthritis of lumbar spine     Sciatica      Past Surgical History:   Procedure Laterality Date     SECTION      HYSTERECTOMY      for hypermenorrhea     Family History   Problem Relation Age of Onset    Heart disease Father     Hyperlipidemia Mother     Hypertension Mother     Alcohol abuse Sister     No Known Problems Brother     No Known Problems Daughter     Asthma Son     Breast cancer Paternal Grandmother     Melanoma Neg Hx     Acne Neg Hx     Lupus Neg Hx     Psoriasis Neg Hx     Eczema Neg Hx      Social History   Substance Use Topics    Smoking status: Never Smoker    Smokeless tobacco: Never Used     Alcohol use No        Review of Systems:  Review of Systems   Constitutional: Negative for chills, diaphoresis, fever, malaise/fatigue and weight loss.   HENT: Negative for congestion.    Respiratory: Negative for shortness of breath.    Cardiovascular: Negative for chest pain and leg swelling.   Gastrointestinal: Positive for blood in stool, constipation and diarrhea. Negative for abdominal pain, nausea and vomiting.   Genitourinary: Negative for dysuria.   Musculoskeletal: Negative for back pain and myalgias.   Skin: Negative for rash.   Neurological: Negative for dizziness and weakness.   Endo/Heme/Allergies: Does not bruise/bleed easily.   Psychiatric/Behavioral: Negative for depression.       OBJECTIVE:     Vital Signs (Most Recent)  /85 (BP Location: Left arm, Patient Position: Sitting, BP Method: Large (Automatic))   Pulse 67   Ht 5' (1.524 m)   Wt 72 kg (158 lb 11.7 oz)   BMI 31.00 kg/m²     Physical Exam:  General: Black or  female in no distress   Neuro: alert and oriented x 4.  Moves all extremities.     HEENT: no icterus.  Trachea midline  Respiratory: respirations are even and unlabored  Cardiac: regular rate  Abdomen: soft, nontender, no masses  Extremities: Warm dry and intact  Skin: no rashes  Anorectal: Resolving thrombosed external hemorrhoid seen on the left lateral side.  Digital exam was performed and was otherwise normal.  No internal masses.  No gross blood    Labs: labs from 9/2017 demonstrate mild anemia (H/H = 12/36)    Imaging: none      ASSESSMENT/PLAN:     Jeannie was seen today for hemorrhoids.    Diagnoses and all orders for this visit:    Perianal venous thrombosis    Screening for colon cancer  -     Case request GI: COLONOSCOPY with SOFÍA in 4 weeks        52-year-old woman with resolving thrombosed external hemorrhoid that is been present for approximately 2 weeks.  This is improving.  I recommended conservative management with sitz baths.  We should  schedule a follow-up colonoscopy as she has not had one in over 10 years.  Request was placed today.  At that time, she should have anoscopy with possible rubber band ligation.    JAX Ramos MD  Staff Surgeon  Colon & Rectal Surgery

## 2018-05-17 ENCOUNTER — OFFICE VISIT (OUTPATIENT)
Dept: SURGERY | Facility: CLINIC | Age: 53
End: 2018-05-17
Payer: COMMERCIAL

## 2018-05-17 ENCOUNTER — TELEPHONE (OUTPATIENT)
Dept: ENDOSCOPY | Facility: HOSPITAL | Age: 53
End: 2018-05-17

## 2018-05-17 VITALS
WEIGHT: 158.75 LBS | BODY MASS INDEX: 31.17 KG/M2 | SYSTOLIC BLOOD PRESSURE: 129 MMHG | HEART RATE: 67 BPM | HEIGHT: 60 IN | DIASTOLIC BLOOD PRESSURE: 85 MMHG

## 2018-05-17 DIAGNOSIS — K64.5 PERIANAL VENOUS THROMBOSIS: Primary | ICD-10-CM

## 2018-05-17 DIAGNOSIS — Z12.11 SPECIAL SCREENING FOR MALIGNANT NEOPLASMS, COLON: Primary | ICD-10-CM

## 2018-05-17 DIAGNOSIS — Z12.11 SCREENING FOR COLON CANCER: ICD-10-CM

## 2018-05-17 PROCEDURE — 3008F BODY MASS INDEX DOCD: CPT | Mod: CPTII,S$GLB,, | Performed by: COLON & RECTAL SURGERY

## 2018-05-17 PROCEDURE — 99999 PR PBB SHADOW E&M-EST. PATIENT-LVL III: CPT | Mod: PBBFAC,,, | Performed by: COLON & RECTAL SURGERY

## 2018-05-17 PROCEDURE — 99203 OFFICE O/P NEW LOW 30 MIN: CPT | Mod: S$GLB,,, | Performed by: COLON & RECTAL SURGERY

## 2018-05-17 RX ORDER — POLYETHYLENE GLYCOL 3350, SODIUM SULFATE ANHYDROUS, SODIUM BICARBONATE, SODIUM CHLORIDE, POTASSIUM CHLORIDE 236; 22.74; 6.74; 5.86; 2.97 G/4L; G/4L; G/4L; G/4L; G/4L
4 POWDER, FOR SOLUTION ORAL ONCE
Qty: 4000 ML | Refills: 0 | Status: SHIPPED | OUTPATIENT
Start: 2018-05-17 | End: 2018-06-09

## 2018-06-01 ENCOUNTER — CLINICAL SUPPORT (OUTPATIENT)
Dept: OCCUPATIONAL MEDICINE | Facility: CLINIC | Age: 53
End: 2018-06-01

## 2018-06-01 DIAGNOSIS — Z02.1 ENCOUNTER FOR PRE-EMPLOYMENT EXAMINATION: ICD-10-CM

## 2018-06-01 PROCEDURE — 99499 UNLISTED E&M SERVICE: CPT | Mod: S$GLB,,, | Performed by: PREVENTIVE MEDICINE

## 2018-06-20 ENCOUNTER — ANESTHESIA EVENT (OUTPATIENT)
Dept: ENDOSCOPY | Facility: HOSPITAL | Age: 53
End: 2018-06-20
Payer: COMMERCIAL

## 2018-06-20 ENCOUNTER — HOSPITAL ENCOUNTER (OUTPATIENT)
Facility: HOSPITAL | Age: 53
Discharge: HOME | End: 2018-06-20
Attending: COLON & RECTAL SURGERY | Admitting: COLON & RECTAL SURGERY
Payer: COMMERCIAL

## 2018-06-20 ENCOUNTER — ANESTHESIA (OUTPATIENT)
Dept: ENDOSCOPY | Facility: HOSPITAL | Age: 53
End: 2018-06-20
Payer: COMMERCIAL

## 2018-06-20 ENCOUNTER — SURGERY (OUTPATIENT)
Age: 53
End: 2018-06-20

## 2018-06-20 VITALS
HEIGHT: 60 IN | OXYGEN SATURATION: 100 % | HEART RATE: 66 BPM | SYSTOLIC BLOOD PRESSURE: 138 MMHG | WEIGHT: 160 LBS | TEMPERATURE: 98 F | BODY MASS INDEX: 31.41 KG/M2 | DIASTOLIC BLOOD PRESSURE: 77 MMHG | RESPIRATION RATE: 18 BRPM

## 2018-06-20 DIAGNOSIS — Z12.11 SCREENING FOR COLON CANCER: Primary | ICD-10-CM

## 2018-06-20 PROCEDURE — 37000008 HC ANESTHESIA 1ST 15 MINUTES: Performed by: COLON & RECTAL SURGERY

## 2018-06-20 PROCEDURE — 45380 COLONOSCOPY AND BIOPSY: CPT | Performed by: COLON & RECTAL SURGERY

## 2018-06-20 PROCEDURE — 88305 TISSUE EXAM BY PATHOLOGIST: CPT | Performed by: PATHOLOGY

## 2018-06-20 PROCEDURE — 25000003 PHARM REV CODE 250: Performed by: NURSE PRACTITIONER

## 2018-06-20 PROCEDURE — 45380 COLONOSCOPY AND BIOPSY: CPT | Mod: 33,,, | Performed by: COLON & RECTAL SURGERY

## 2018-06-20 PROCEDURE — 27201012 HC FORCEPS, HOT/COLD, DISP: Performed by: COLON & RECTAL SURGERY

## 2018-06-20 PROCEDURE — 88305 TISSUE EXAM BY PATHOLOGIST: CPT | Mod: 26,,, | Performed by: PATHOLOGY

## 2018-06-20 PROCEDURE — 63600175 PHARM REV CODE 636 W HCPCS: Performed by: NURSE ANESTHETIST, CERTIFIED REGISTERED

## 2018-06-20 PROCEDURE — 37000009 HC ANESTHESIA EA ADD 15 MINS: Performed by: COLON & RECTAL SURGERY

## 2018-06-20 PROCEDURE — E9220 PRA ENDO ANESTHESIA: HCPCS | Mod: 33,,, | Performed by: NURSE ANESTHETIST, CERTIFIED REGISTERED

## 2018-06-20 RX ORDER — LIDOCAINE HCL/PF 100 MG/5ML
SYRINGE (ML) INTRAVENOUS
Status: DISCONTINUED | OUTPATIENT
Start: 2018-06-20 | End: 2018-06-20

## 2018-06-20 RX ORDER — PROPOFOL 10 MG/ML
VIAL (ML) INTRAVENOUS CONTINUOUS PRN
Status: DISCONTINUED | OUTPATIENT
Start: 2018-06-20 | End: 2018-06-20

## 2018-06-20 RX ORDER — SODIUM CHLORIDE 9 MG/ML
INJECTION, SOLUTION INTRAVENOUS CONTINUOUS
Status: DISCONTINUED | OUTPATIENT
Start: 2018-06-20 | End: 2018-06-20 | Stop reason: HOSPADM

## 2018-06-20 RX ORDER — PROPOFOL 10 MG/ML
VIAL (ML) INTRAVENOUS
Status: DISCONTINUED | OUTPATIENT
Start: 2018-06-20 | End: 2018-06-20

## 2018-06-20 RX ADMIN — PROPOFOL 70 MG: 10 INJECTION, EMULSION INTRAVENOUS at 09:06

## 2018-06-20 RX ADMIN — PROPOFOL 175 MCG/KG/MIN: 10 INJECTION, EMULSION INTRAVENOUS at 09:06

## 2018-06-20 RX ADMIN — SODIUM CHLORIDE: 0.9 INJECTION, SOLUTION INTRAVENOUS at 08:06

## 2018-06-20 RX ADMIN — LIDOCAINE HYDROCHLORIDE 100 MG: 20 INJECTION, SOLUTION INTRAVENOUS at 09:06

## 2018-06-20 NOTE — [employer] PATIENT INSTRUCTIONS
Discharge Summary/Instructions after an Endoscopic Procedure  Patient Name: Jeannie Saavedra  Patient MRN: [id number]  Patient YOB: 1965 Wednesday, June 20, 2018  Cali Ramos MD  RESTRICTIONS:  During your procedure today, you received medications for sedation.  These   medications may affect your judgment, balance and coordination.  Therefore,   for 24 hours, you have the following restrictions:   - DO NOT drive a car, operate machinery, make legal/financial decisions,   sign important papers or drink alcohol.    ACTIVITY:  Today: no heavy lifting, straining or running due to procedural   sedation/anesthesia.  The following day: return to full activity including work.  DIET:  Eat and drink normally unless instructed otherwise.     TREATMENT FOR COMMON SIDE EFFECTS:  - Mild abdominal pain, nausea, belching, bloating or excessive gas:  rest,   eat lightly and use a heating pad.  - Sore Throat: treat with throat lozenges and/or gargle with warm salt   water.  - Because air was used during the procedure, expelling large amounts of air   from your rectum or belching is normal.  - If a bowel prep was taken, you may not have a bowel movement for 1-3 days.    This is normal.  SYMPTOMS TO WATCH FOR AND REPORT TO YOUR PHYSICIAN:  1. Abdominal pain or bloating, other than gas cramps.  2. Chest pain.  3. Back pain.  4. Signs of infection such as: chills or fever occurring within 24 hours   after the procedure.  5. Rectal bleeding, which would show as bright red, maroon, or black stools.   (A tablespoon of blood from the rectum is not serious, especially if   hemorrhoids are present.)  6. Vomiting.  7. Weakness or dizziness.  GO DIRECTLY TO THE NEAREST EMERGENCY ROOM IF YOU HAVE ANY OF THE FOLLOWING:      Difficulty breathing              Chills and/or fever over 101 F   Persistent vomiting and/or vomiting blood   Severe abdominal pain   Severe chest pain   Black, tarry stools   Bleeding- more than one  tablespoon   Any other symptom or condition that you feel may need urgent attention  Your doctor recommends these additional instructions:  If any biopsies were taken, your doctors clinic will contact you in 1 to 2   weeks with any results.  - Discharge patient to home (ambulatory).   - Resume previous diet.   - Continue present medications.   - Await pathology results.   - Repeat colonoscopy in 5-10 years for surveillance based on pathology   results.  For questions, problems or results please call your physician - Cali Ramos MD at Work:  [phone].  [facility], EMERGENCY ROOM PHONE NUMBER: [phone]  IF A COMPLICATION OR EMERGENCY SITUATION ARISES AND YOU ARE UNABLE TO REACH   YOUR PHYSICIAN - GO DIRECTLY TO THE EMERGENCY ROOM.  Cali Ramos MD  6/20/2018 9:31:31 AM  This report has been verified and signed electronically.  [employer]

## 2018-06-20 NOTE — ANESTHESIA PREPROCEDURE EVALUATION
06/20/2018  Jeannie Saavedra is a 52 y.o., female.    Anesthesia Evaluation    I have reviewed the Patient Summary Reports.     I have reviewed the Medications.     Review of Systems  Anesthesia Hx:  No problems with previous Anesthesia Denies Hx of Anesthetic complications   Denies Personal Hx of Anesthesia complications.   Hematology/Oncology:  Hematology Normal   Oncology Normal     EENT/Dental:EENT/Dental Normal   Cardiovascular:  Cardiovascular Normal Exercise tolerance: good     Pulmonary:   Asthma    Renal/:   Chronic Renal Disease    Hepatic/GI:  Hepatic/GI Normal    Musculoskeletal:   Arthritis     Neurological:   Neuromuscular Disease,    Endocrine:  Endocrine Normal    Dermatological:  Skin Normal    Psych:  Psychiatric Normal           Physical Exam  General:  Well nourished    Airway/Jaw/Neck:  Airway Findings: Mouth Opening: Normal Tongue: Normal  General Airway Assessment: Adult  Mallampati: II  Improves to I with phonation.  TM Distance: Normal, at least 6 cm        Eyes/Ears/Nose:  EYES/EARS/NOSE FINDINGS: Normal   Dental:  DENTAL FINDINGS: Normal   Chest/Lungs:  Chest/Lungs Clear    Heart/Vascular:  Heart Findings: Normal Heart murmur: negative Vascular Findings: Normal    Abdomen:  Abdomen Findings: Normal    Musculoskeletal:  Musculoskeletal Findings: Normal   Skin:  Skin Findings: Normal    Mental Status:  Mental Status Findings: Normal        Anesthesia Plan  Type of Anesthesia, risks & benefits discussed:  Anesthesia Type:  general  Patient's Preference: General  Intra-op Monitoring Plan: standard ASA monitors  Intra-op Monitoring Plan Comments:   Post Op Pain Control Plan:   Post Op Pain Control Plan Comments:   Induction:   IV  Beta Blocker:  Patient is not currently on a Beta-Blocker (No further documentation required).       Informed Consent: Patient understands risks and agrees  with Anesthesia plan.  Questions answered. Anesthesia consent signed with patient.  ASA Score: 2     Day of Surgery Review of History & Physical:    H&P update referred to the surgeon.         Ready For Surgery From Anesthesia Perspective.

## 2018-06-20 NOTE — H&P
COLONOSCOPY HISTORY AND PE    Procedure : Colonoscopy      INDICATIONS: asymptomatic screening exam and rectal bleeding    Family Hx of CRC: no    Last Colonoscopy:  10 years agp  Findings: normal       Past Medical History:   Diagnosis Date    Allergy     Asthma     Keloid cicatrix     Nephrolithiasis     Osteoarthritis of lumbar spine     Sciatica      Sedation Problems: NO  Family History   Problem Relation Age of Onset    Heart disease Father     Hyperlipidemia Mother     Hypertension Mother     Alcohol abuse Sister     No Known Problems Brother     No Known Problems Daughter     Asthma Son     Breast cancer Paternal Grandmother     Melanoma Neg Hx     Acne Neg Hx     Lupus Neg Hx     Psoriasis Neg Hx     Eczema Neg Hx      Fam Hx of Sedation Problems: NO  Social History     Social History    Marital status:      Spouse name: N/A    Number of children: N/A    Years of education: N/A     Occupational History     [employer]     Social History Main Topics    Smoking status: Never Smoker    Smokeless tobacco: Never Used    Alcohol use No    Drug use: No    Sexual activity: Not on file     Other Topics Concern    Not on file     Social History Narrative    No narrative on file       Review of Systems - Negative except   Respiratory ROS: no dyspnea  Cardiovascular ROS: no exertional chest pain  Gastrointestinal ROS: NO abdominal discomfort,  YES intermittent rectal bleeding  Musculoskeletal ROS: no muscular pain  Neurological ROS: no recent stroke    Physical Exam:  /81 (BP Location: Left arm, Patient Position: Lying)   Pulse 72   Temp 97.9 °F (36.6 °C) (Temporal)   Resp 15   Ht 5' (1.524 m)   Wt 72.6 kg (160 lb)   SpO2 99%   Breastfeeding? No   BMI 31.25 kg/m²   General: no distress  Head: normocephalic  Mallampati Score   Neck: supple, symmetrical, trachea midline  Lungs:  clear to auscultation bilaterally and normal respiratory effort  Heart: regular  rate and rhythm and no murmur  Abdomen: soft, non-tender non-distented; bowel sounds normal; no masses,  no organomegaly  Extremities: no cyanosis or edema, or clubbing    ASA:  II    PLAN  COLONOSCOPY.    SedationPlan :MAC    The details of the procedure, the possible need for biopsy or polypectomy and the potential risks including bleeding, perforation, missed polyps were discussed in detail.

## 2018-06-20 NOTE — ANESTHESIA POSTPROCEDURE EVALUATION
Anesthesia Post Evaluation    Patient: Jeannie Saavedra    Procedure(s) Performed: Procedure(s) (LRB):  COLONOSCOPY with SOFÍA in 4 weeks (N/A)    Final Anesthesia Type: general  Patient location during evaluation: PACU  Patient participation: Yes- Able to Participate  Level of consciousness: awake and alert  Post-procedure vital signs: reviewed and stable  Pain management: adequate  Airway patency: patent  PONV status at discharge: No PONV  Anesthetic complications: no      Cardiovascular status: hemodynamically stable  Respiratory status: room air, spontaneous ventilation and unassisted  Hydration status: euvolemic  Follow-up not needed.        Visit Vitals  /77   Pulse 66   Temp 36.7 °C (98.1 °F)   Resp 18   Ht 5' (1.524 m)   Wt 72.6 kg (160 lb)   SpO2 100%   Breastfeeding? No   BMI 31.25 kg/m²       Pain/Randy Score: Pain Assessment Performed: Yes (6/20/2018 10:02 AM)  Presence of Pain: denies (6/20/2018 10:02 AM)  Randy Score: 10 (6/20/2018 10:02 AM)

## 2018-06-20 NOTE — DISCHARGE INSTRUCTIONS
Colonoscopy     A camera attached to a flexible tube with a viewing lens is used to take video pictures.     Colonoscopy is a test to view the inside of your lower digestive tract (colon and rectum). Sometimes it can show the last part of the small intestine (ileum). During the test, small pieces of tissue may be removed for testing. This is called a biopsy. Small growths, such as polyps, may also be removed.   Why is colonoscopy done?  The test is done to help look for colon cancer. And it can help find the source of abdominal pain, bleeding, and changes in bowel habits. It may be needed once a year, depending on factors such as your:  · Age  · Health history  · Family health history  · Symptoms  · Results from any prior colonoscopy  Risks and possible complications  These include:  · Bleeding               · A puncture or tear in the colon   · Risks of anesthesia  · A cancer lesion not being seen  Getting ready   To prepare for the test:  · Talk with your healthcare provider about the risks of the test (see below). Also ask your healthcare provider about alternatives to the test.  · Tell your healthcare provider about any medicines you take. Also tell him or her about any health conditions you may have.  · Make sure your rectum and colon are empty for the test. Follow the diet and bowel prep instructions exactly. If you dont, the test may need to be rescheduled.  · Plan for a friend or family member to drive you home after the test.     Colonoscopy provides an inside view of the entire colon.     You may discuss the results with your doctor right away or at a future visit.  During the test   The test is usually done in the hospital on an outpatient basis. This means you go home the same day. The procedure takes about 30 minutes. During that time:  · You are given relaxing (sedating) medicine through an IV line. You may be drowsy, or fully asleep.  · The healthcare provider will first give you a physical exam to  check for anal and rectal problems.  · Then the anus is lubricated and the scope inserted.  · If you are awake, you may have a feeling similar to needing to have a bowel movement. You may also feel pressure as air is pumped into the colon. Its OK to pass gas during the procedure.  · Biopsy, polyp removal, or other treatments may be done during the test.  After the test   You may have gas right after the test. It can help to try to pass it to help prevent later bloating. Your healthcare provider may discuss the results with you right away. Or you may need to schedule a follow-up visit to talk about the results. After the test, you can go back to your normal eating and other activities. You may be tired from the sedation and need to rest for a few hours.  Date Last Reviewed: 11/1/2016 © 2000-2017 [employer] [address] [place] [address], [place] [address]. All rights reserved. This information is not intended as a substitute for professional medical care. Always follow your healthcare professional's instructions.

## 2018-06-20 NOTE — TRANSFER OF CARE
Anesthesia Transfer of Care Note    Patient: Jeannie Saavedra    Procedure(s) Performed: Procedure(s) (LRB):  COLONOSCOPY with SOFÍA in 4 weeks (N/A)    Patient location: PACU    Anesthesia Type: general    Transport from OR: Transported from OR on room air with adequate spontaneous ventilation    Post pain: adequate analgesia    Post assessment: no apparent anesthetic complications    Post vital signs: stable    Level of consciousness: awake, alert and oriented    Nausea/Vomiting: no nausea/vomiting    Complications: none    Transfer of care protocol was followed      Last vitals:   Visit Vitals  /81 (BP Location: Left arm, Patient Position: Lying)   Pulse 72   Temp 36.6 °C (97.9 °F) (Temporal)   Resp 15   Ht 5' (1.524 m)   Wt 72.6 kg (160 lb)   SpO2 99%   Breastfeeding? No   BMI 31.25 kg/m²

## 2018-06-27 ENCOUNTER — TELEPHONE (OUTPATIENT)
Dept: ENDOSCOPY | Facility: HOSPITAL | Age: 53
End: 2018-06-27

## 2018-07-09 ENCOUNTER — PATIENT MESSAGE (OUTPATIENT)
Dept: INTERNAL MEDICINE | Facility: CLINIC | Age: 53
End: 2018-07-09

## 2018-07-09 RX ORDER — LORAZEPAM 0.5 MG/1
0.5 TABLET ORAL NIGHTLY
Qty: 30 TABLET | Refills: 0 | Status: SHIPPED | OUTPATIENT
Start: 2018-07-09 | End: 2018-08-21 | Stop reason: SDUPTHER

## 2018-07-16 ENCOUNTER — PATIENT MESSAGE (OUTPATIENT)
Dept: INTERNAL MEDICINE | Facility: CLINIC | Age: 53
End: 2018-07-16

## 2018-07-17 ENCOUNTER — TELEPHONE (OUTPATIENT)
Dept: INTERNAL MEDICINE | Facility: CLINIC | Age: 53
End: 2018-07-17

## 2018-07-18 ENCOUNTER — OFFICE VISIT (OUTPATIENT)
Dept: INTERNAL MEDICINE | Facility: CLINIC | Age: 53
End: 2018-07-18
Payer: COMMERCIAL

## 2018-07-18 VITALS
WEIGHT: 161.69 LBS | SYSTOLIC BLOOD PRESSURE: 106 MMHG | BODY MASS INDEX: 31.74 KG/M2 | OXYGEN SATURATION: 96 % | HEART RATE: 81 BPM | DIASTOLIC BLOOD PRESSURE: 68 MMHG | HEIGHT: 60 IN

## 2018-07-18 DIAGNOSIS — L98.9 SKIN LESION: Primary | ICD-10-CM

## 2018-07-18 PROCEDURE — 3008F BODY MASS INDEX DOCD: CPT | Mod: CPTII,S$GLB,, | Performed by: INTERNAL MEDICINE

## 2018-07-18 PROCEDURE — 99213 OFFICE O/P EST LOW 20 MIN: CPT | Mod: S$GLB,,, | Performed by: INTERNAL MEDICINE

## 2018-07-18 PROCEDURE — 99999 PR PBB SHADOW E&M-EST. PATIENT-LVL III: CPT | Mod: PBBFAC,,, | Performed by: INTERNAL MEDICINE

## 2018-07-18 RX ORDER — GRISEOFULVIN 250 MG/1
500 TABLET ORAL DAILY
Qty: 14 TABLET | Refills: 0 | Status: SHIPPED | OUTPATIENT
Start: 2018-07-18 | End: 2018-08-17

## 2018-07-18 NOTE — PROGRESS NOTES
Subjective:       Patient ID: Jeannie Saavedra is a 52 y.o. female.    Chief Complaint: Rash (rash on left side)    HPI   She thought she had .  Applied an aeroslolized med, and her tea shirt stuck to it.  When she removed shirt, the center denuded.  Then applied antibioitic cream.  Son had similar rash attributed to ring worm in past.  No other lesions. No pets but exposed to horse.  Review of Systems   Constitutional: Negative for fatigue, fever and unexpected weight change.       Objective:      Physical Exam   Constitutional: She is oriented to person, place, and time. She appears well-developed and well-nourished.   Neurological: She is alert and oriented to person, place, and time.   Psychiatric: She has a normal mood and affect. Her behavior is normal.         Annular lesion L lower abd with discreet border.  Central lesion is denuded.  No drainage.  Assessment:       1. Skin lesion                ? Tinea.  Traumatized, so hard to know for sure.           She'd like to try griseofulvin.  I don't feel uncomfortable applying anti-fungal cream since traumatized.  Plan:       Jeannie was seen today for rash.    Diagnoses and all orders for this visit:    Skin lesion    Other orders  -     griseofulvin (GRIFULVIN V) 500 mg tablet; Take 1 tablet (500 mg total) by mouth once daily.       Derm if no better - keep me informed.

## 2018-08-22 RX ORDER — LORAZEPAM 0.5 MG/1
TABLET ORAL
Qty: 30 TABLET | Refills: 0 | Status: SHIPPED | OUTPATIENT
Start: 2018-08-22 | End: 2018-10-08 | Stop reason: SDUPTHER

## 2018-10-09 RX ORDER — LORAZEPAM 0.5 MG/1
TABLET ORAL
Qty: 30 TABLET | Refills: 0 | Status: SHIPPED | OUTPATIENT
Start: 2018-10-09 | End: 2018-11-23 | Stop reason: SDUPTHER

## 2018-10-09 RX ORDER — CYPROHEPTADINE HYDROCHLORIDE 4 MG/1
TABLET ORAL
Qty: 60 TABLET | Refills: 1 | Status: SHIPPED | OUTPATIENT
Start: 2018-10-09 | End: 2019-02-06 | Stop reason: SDUPTHER

## 2018-11-05 ENCOUNTER — OFFICE VISIT (OUTPATIENT)
Dept: INTERNAL MEDICINE | Facility: CLINIC | Age: 53
End: 2018-11-05
Payer: COMMERCIAL

## 2018-11-05 VITALS
HEIGHT: 60 IN | SYSTOLIC BLOOD PRESSURE: 98 MMHG | HEART RATE: 107 BPM | DIASTOLIC BLOOD PRESSURE: 76 MMHG | TEMPERATURE: 99 F | OXYGEN SATURATION: 98 % | WEIGHT: 161.69 LBS | BODY MASS INDEX: 31.74 KG/M2

## 2018-11-05 DIAGNOSIS — J06.9 VIRAL URI WITH COUGH: Primary | ICD-10-CM

## 2018-11-05 DIAGNOSIS — H69.93 EUSTACHIAN TUBE DYSFUNCTION, BILATERAL: ICD-10-CM

## 2018-11-05 DIAGNOSIS — R09.81 NASAL CONGESTION: ICD-10-CM

## 2018-11-05 PROCEDURE — 3008F BODY MASS INDEX DOCD: CPT | Mod: CPTII,S$GLB,, | Performed by: INTERNAL MEDICINE

## 2018-11-05 PROCEDURE — 99999 PR PBB SHADOW E&M-EST. PATIENT-LVL IV: CPT | Mod: PBBFAC,,, | Performed by: INTERNAL MEDICINE

## 2018-11-05 PROCEDURE — 99213 OFFICE O/P EST LOW 20 MIN: CPT | Mod: S$GLB,,, | Performed by: INTERNAL MEDICINE

## 2018-11-05 RX ORDER — PREDNISONE 20 MG/1
TABLET ORAL
Qty: 6 TABLET | Refills: 0 | Status: SHIPPED | OUTPATIENT
Start: 2018-11-05 | End: 2019-01-02

## 2018-11-05 RX ORDER — FLUTICASONE PROPIONATE 50 MCG
2 SPRAY, SUSPENSION (ML) NASAL DAILY
Qty: 16 G | Refills: 12 | Status: SHIPPED | OUTPATIENT
Start: 2018-11-05 | End: 2018-12-05

## 2018-11-05 NOTE — PROGRESS NOTES
Subjective:       Patient ID: Jeannie Saavedra is a 52 y.o. female.    Chief Complaint: Cough; Headache; Sinusitis; Nasal Congestion; and Otalgia    HPI   C/o 5 day h/o clogged ears, post nasal drip, runny nose, sneezing, coughing when supine.  No wheezing.  No fever.  She tried flonase, mucinex, claritin D    She would like a steroid injection.  Review of Systems   Constitutional: Negative for fever and unexpected weight change.   HENT: Negative for congestion and postnasal drip.    Eyes: Negative for pain, discharge and visual disturbance.   Respiratory: Negative for cough, chest tightness, shortness of breath and wheezing.    Cardiovascular: Negative for chest pain and leg swelling.   Gastrointestinal: Negative for abdominal pain, constipation, diarrhea and nausea.   Genitourinary: Negative for difficulty urinating, dysuria and hematuria.   Skin: Negative for rash.   Neurological: Negative for headaches.   Psychiatric/Behavioral: Negative for dysphoric mood and sleep disturbance. The patient is not nervous/anxious.        Objective:      Physical Exam   Constitutional: She is oriented to person, place, and time. She appears well-developed and well-nourished. No distress.   HENT:   Head: Normocephalic and atraumatic.   Mouth/Throat: Oropharynx is clear and moist. No oropharyngeal exudate.   Tm's clear   Neck: Neck supple.   Cardiovascular: Normal rate and regular rhythm.   Pulmonary/Chest: Effort normal and breath sounds normal. No respiratory distress. She has no wheezes. She has no rales.   Lymphadenopathy:     She has no cervical adenopathy.   Neurological: She is alert and oriented to person, place, and time.   Psychiatric: She has a normal mood and affect. Her behavior is normal.       Assessment:       1. Viral URI with cough    2. Nasal congestion    3. Eustachian tube dysfunction, bilateral        Plan:       Jeannie was seen today for cough, headache, sinusitis, nasal congestion and otalgia.    Diagnoses  and all orders for this visit:    Viral URI with cough    Nasal congestion    Eustachian tube dysfunction, bilateral    Other orders  -     predniSONE (DELTASONE) 20 MG tablet; 2 tabs po q day for 3 days  -     fluticasone (FLONASE) 50 mcg/actuation nasal spray; 2 sprays (100 mcg total) by Each Nare route once daily.

## 2018-11-07 ENCOUNTER — PATIENT MESSAGE (OUTPATIENT)
Dept: INTERNAL MEDICINE | Facility: CLINIC | Age: 53
End: 2018-11-07

## 2018-11-23 RX ORDER — LORAZEPAM 0.5 MG/1
TABLET ORAL
Qty: 30 TABLET | Refills: 0 | Status: SHIPPED | OUTPATIENT
Start: 2018-11-23 | End: 2018-12-20 | Stop reason: SDUPTHER

## 2018-11-26 ENCOUNTER — TELEPHONE (OUTPATIENT)
Dept: INTERNAL MEDICINE | Facility: CLINIC | Age: 53
End: 2018-11-26

## 2018-11-26 NOTE — TELEPHONE ENCOUNTER
Called [facility] and spoke to Farhan and advised of directions below:  LORazepam (ATIVAN) 0.5 MG tablet 30 tablet 0 11/23/2018  No   Sig: TAKE 1 TABLET BY MOUTH ONCE DAILY IN THE EVENING

## 2018-11-26 NOTE — TELEPHONE ENCOUNTER
----- Message from Russel Perkins sent at 11/23/2018  3:19 PM CST -----  Contact: Brittaney/[facility]/[phone]  Pharmacy needs verification on LORazepam (ATIVAN) 0.5 MG tablet. Please advise.        Thanks

## 2018-12-13 ENCOUNTER — HOSPITAL ENCOUNTER (OUTPATIENT)
Dept: RADIOLOGY | Facility: HOSPITAL | Age: 53
Discharge: HOME | End: 2018-12-13
Attending: PHYSICIAN ASSISTANT
Payer: COMMERCIAL

## 2018-12-13 ENCOUNTER — OFFICE VISIT (OUTPATIENT)
Dept: ORTHOPEDICS | Facility: CLINIC | Age: 53
End: 2018-12-13
Payer: COMMERCIAL

## 2018-12-13 DIAGNOSIS — M25.561 RIGHT KNEE PAIN, UNSPECIFIED CHRONICITY: ICD-10-CM

## 2018-12-13 DIAGNOSIS — M25.561 RIGHT KNEE PAIN, UNSPECIFIED CHRONICITY: Primary | ICD-10-CM

## 2018-12-13 DIAGNOSIS — M17.11 PRIMARY OSTEOARTHRITIS OF RIGHT KNEE: Primary | ICD-10-CM

## 2018-12-13 PROCEDURE — 99213 OFFICE O/P EST LOW 20 MIN: CPT | Mod: S$GLB,,, | Performed by: PHYSICIAN ASSISTANT

## 2018-12-13 PROCEDURE — 73564 X-RAY EXAM KNEE 4 OR MORE: CPT | Mod: 26,RT,, | Performed by: RADIOLOGY

## 2018-12-13 PROCEDURE — 99999 PR PBB SHADOW E&M-EST. PATIENT-LVL II: CPT | Mod: PBBFAC,,, | Performed by: PHYSICIAN ASSISTANT

## 2018-12-13 PROCEDURE — 73564 X-RAY EXAM KNEE 4 OR MORE: CPT | Mod: TC,RT

## 2018-12-13 PROCEDURE — 73562 X-RAY EXAM OF KNEE 3: CPT | Mod: 26,XS,LT, | Performed by: RADIOLOGY

## 2018-12-13 PROCEDURE — 73562 X-RAY EXAM OF KNEE 3: CPT | Mod: TC,LT

## 2018-12-13 RX ORDER — DICLOFENAC SODIUM 75 MG/1
75 TABLET, DELAYED RELEASE ORAL 2 TIMES DAILY
Qty: 60 TABLET | Refills: 0 | Status: SHIPPED | OUTPATIENT
Start: 2018-12-13 | End: 2019-01-12

## 2018-12-14 NOTE — PROGRESS NOTES
Subjective:      Patient ID: Jeannie Saavedra is a 53 y.o. female.    Chief Complaint: Pain of the Right Knee    HPI  53 year old female presents with chief complaint of right knee pain x 2 months. She denies trauma. Pain is worse with walking and flexion. She can't kneel and she has limited ROM. She does limp. She reports swelling and popping. No locking or catching. She took pain medicine and Ibuprofen 800 mg without much relief. She saw Dr. Palacios who gave her some injections recently (before Thanksgiving) but it did not help. She had left knee scope for torn meniscus by Dr. Palacios in Feb 2018. She does not use assistive devices.   Review of Systems   Constitution: Negative for chills, fever and night sweats.   Cardiovascular: Negative for chest pain.   Respiratory: Negative for cough and shortness of breath.    Hematologic/Lymphatic: Does not bruise/bleed easily.   Skin: Negative for color change.   Gastrointestinal: Negative for heartburn.   Genitourinary: Negative for dysuria.   Neurological: Negative for numbness and paresthesias.   Psychiatric/Behavioral: Negative for altered mental status.   Allergic/Immunologic: Negative for persistent infections.         Objective:            Ortho/SPM Exam  General :   alert, appears stated age and cooperative   Gait: Antalgic. The patient can bear weight on the injured extremity.   Right Lower Extremity  Hip Palpation:  no tenderness over the greater  trochanter   Hip ROM: 100% of normal    Knee Effusion:  1+   Ecchymosis:  none   Knee ROM:  5 to 90 degrees with subpatellar   crepitance.   Patella:  Patella does track normally.  Patellar apprehension test: negative  Patellar compression test: positive   Tenderness: medial joint line   Stability:  Lachman's test: negative  Posterior drawer: negative  Medial collateral ligament: negative  Lateral collateral ligament: negative         Avani's Test:  positive    Sensation:   intact to light touch   Pulses: normal DP and  PT pulses         X-ray: ordered and reviewed by myself. Right: No fracture or dislocation.  Small effusion.  Moderate narrowing of medial tibiofemoral cartilage space.         Assessment:       Encounter Diagnosis   Name Primary?    Primary osteoarthritis of right knee Yes          Plan:       Discussed treatment options with patient. She will call Dr. Palacios's office to find out the type of injection she recently received. Then she will call the clinic and let us know. Will possibly order euflexxa as long as she did not receive this recently. She will ice and elevate knee in the mean time. Diclofenac sent to pharmacy.

## 2018-12-21 RX ORDER — LORAZEPAM 0.5 MG/1
TABLET ORAL
Qty: 30 TABLET | Refills: 0 | Status: SHIPPED | OUTPATIENT
Start: 2018-12-21 | End: 2019-02-06 | Stop reason: SDUPTHER

## 2018-12-21 RX ORDER — LORAZEPAM 1 MG/1
1 TABLET ORAL EVERY 6 HOURS PRN
OUTPATIENT
Start: 2018-12-21

## 2018-12-21 RX ORDER — LORAZEPAM 1 MG/1
1 TABLET ORAL EVERY 6 HOURS PRN
COMMUNITY
End: 2019-01-02 | Stop reason: SDUPTHER

## 2018-12-21 NOTE — TELEPHONE ENCOUNTER
----- Message from Aleisha Dueñas sent at 12/21/2018 10:46 AM CST -----  Contact: patient [phone]  Is this a refill or new RX:  Refill Change in the miligrams    RX name and strength: LORazepam (ATIVAN) 0.5 MG tablet    She is requesting that you change the miligram to 1 mg because, she has been taking 2 of the  0.5 MG tablet.  She put in for the refill to [facility] today.     Pharmacy name and phone # [facility] [address] [place] [address] - [address] [facility] [phone] (Phone)  [phone] (Fax)

## 2019-01-02 ENCOUNTER — OFFICE VISIT (OUTPATIENT)
Dept: INTERNAL MEDICINE | Facility: CLINIC | Age: 54
End: 2019-01-02
Payer: COMMERCIAL

## 2019-01-02 ENCOUNTER — TELEPHONE (OUTPATIENT)
Dept: INTERNAL MEDICINE | Facility: CLINIC | Age: 54
End: 2019-01-02

## 2019-01-02 VITALS
BODY MASS INDEX: 32.14 KG/M2 | SYSTOLIC BLOOD PRESSURE: 108 MMHG | WEIGHT: 163.69 LBS | HEIGHT: 60 IN | TEMPERATURE: 99 F | DIASTOLIC BLOOD PRESSURE: 82 MMHG

## 2019-01-02 DIAGNOSIS — R30.0 DYSURIA: Primary | ICD-10-CM

## 2019-01-02 LAB
BILIRUB SERPL-MCNC: NORMAL MG/DL
BLOOD, POC UA: NORMAL
GLUCOSE UR QL STRIP: 50
KETONES UR QL STRIP: NORMAL
LEUKOCYTE ESTERASE URINE, POC: NORMAL
NITRITE, POC UA: NORMAL
PH, POC UA: 6
PROTEIN, POC: NORMAL
SPECIFIC GRAVITY, POC UA: 1.02
UROBILINOGEN, POC UA: NORMAL

## 2019-01-02 PROCEDURE — 3008F PR BODY MASS INDEX (BMI) DOCUMENTED: ICD-10-PCS | Mod: CPTII,S$GLB,, | Performed by: INTERNAL MEDICINE

## 2019-01-02 PROCEDURE — 3008F BODY MASS INDEX DOCD: CPT | Mod: CPTII,S$GLB,, | Performed by: INTERNAL MEDICINE

## 2019-01-02 PROCEDURE — 87186 SC STD MICRODIL/AGAR DIL: CPT

## 2019-01-02 PROCEDURE — 99213 PR OFFICE/OUTPT VISIT, EST, LEVL III, 20-29 MIN: ICD-10-PCS | Mod: 25,S$GLB,, | Performed by: INTERNAL MEDICINE

## 2019-01-02 PROCEDURE — 87077 CULTURE AEROBIC IDENTIFY: CPT

## 2019-01-02 PROCEDURE — 99999 PR PBB SHADOW E&M-EST. PATIENT-LVL III: ICD-10-PCS | Mod: PBBFAC,,, | Performed by: INTERNAL MEDICINE

## 2019-01-02 PROCEDURE — 81000 POCT URINALYSIS: ICD-10-PCS | Mod: S$GLB,,, | Performed by: INTERNAL MEDICINE

## 2019-01-02 PROCEDURE — 87086 URINE CULTURE/COLONY COUNT: CPT

## 2019-01-02 PROCEDURE — 81000 URINALYSIS NONAUTO W/SCOPE: CPT | Mod: S$GLB,,, | Performed by: INTERNAL MEDICINE

## 2019-01-02 PROCEDURE — 99213 OFFICE O/P EST LOW 20 MIN: CPT | Mod: 25,S$GLB,, | Performed by: INTERNAL MEDICINE

## 2019-01-02 PROCEDURE — 99999 PR PBB SHADOW E&M-EST. PATIENT-LVL III: CPT | Mod: PBBFAC,,, | Performed by: INTERNAL MEDICINE

## 2019-01-02 PROCEDURE — 87088 URINE BACTERIA CULTURE: CPT

## 2019-01-02 RX ORDER — NITROFURANTOIN 25; 75 MG/1; MG/1
100 CAPSULE ORAL 2 TIMES DAILY
Qty: 14 CAPSULE | Refills: 0 | Status: SHIPPED | OUTPATIENT
Start: 2019-01-02 | End: 2019-01-09

## 2019-01-02 RX ORDER — NITROFURANTOIN 25; 75 MG/1; MG/1
100 CAPSULE ORAL 2 TIMES DAILY
Qty: 14 CAPSULE | Refills: 0 | Status: SHIPPED | OUTPATIENT
Start: 2019-01-02 | End: 2019-01-02 | Stop reason: SDUPTHER

## 2019-01-02 RX ORDER — PHENAZOPYRIDINE HYDROCHLORIDE 200 MG/1
200 TABLET, FILM COATED ORAL 3 TIMES DAILY PRN
Qty: 9 TABLET | Refills: 0 | Status: SHIPPED | OUTPATIENT
Start: 2019-01-02 | End: 2019-01-12

## 2019-01-02 NOTE — TELEPHONE ENCOUNTER
Called and spoke to [facility] [phone] and verified message below for medication

## 2019-01-02 NOTE — TELEPHONE ENCOUNTER
----- Message from Carmencita Zaldivar sent at 1/2/2019 10:54 AM CST -----  Contact: [facility] [phone]  Lac from [facility] is calling to get quantity and direction for prescription nitrofurantoin, macrocrystal-monohydrate, (MACROBID) 100 MG capsule.    Please advise, thanks

## 2019-01-02 NOTE — PROGRESS NOTES
Subjective:       Patient ID: Jeannie Saavedra is a 53 y.o. female.    Chief Complaint: Urinary Tract Infection    HPI   C/o urinary discomfort starting dec 28, and odor.  No blood in urine.  No fever.    No back pain.  No fever.   Last uti was over 1 year ago.  Review of Systems   Constitutional: Negative for fever and unexpected weight change.   HENT: Negative for congestion and postnasal drip.    Eyes: Negative for pain, discharge and visual disturbance.   Respiratory: Negative for cough, chest tightness, shortness of breath and wheezing.    Cardiovascular: Negative for chest pain and leg swelling.   Gastrointestinal: Negative for abdominal pain, constipation, diarrhea and nausea.   Genitourinary: Negative for difficulty urinating, dysuria and hematuria.   Skin: Negative for rash.   Neurological: Negative for headaches.   Psychiatric/Behavioral: Negative for dysphoric mood and sleep disturbance. The patient is not nervous/anxious.        Objective:      Physical Exam   Constitutional: She is oriented to person, place, and time. She appears well-developed and well-nourished. No distress.   Abdominal: Soft. She exhibits no mass. There is no tenderness. There is no rebound and no guarding.   Musculoskeletal: She exhibits no tenderness (of flank).   Neurological: She is alert and oriented to person, place, and time.       Assessment:       1. Dysuria        Plan:       .Jeannie was seen today for urinary tract infection.    Diagnoses and all orders for this visit:    Dysuria  -     Urine culture  -     POCT URINALYSIS    Other orders  -     nitrofurantoin, macrocrystal-monohydrate, (MACROBID) 100 MG capsule; Take 1 capsule (100 mg total) by mouth 2 (two) times daily. for 10 days  -     phenazopyridine (PYRIDIUM) 200 MG tablet; Take 1 tablet (200 mg total) by mouth 3 (three) times daily as needed for Pain.

## 2019-01-02 NOTE — TELEPHONE ENCOUNTER
nitrofurantoin, macrocrystal-monohydrate, (MACROBID) 100 MG capsule 14 capsule 0 1/2/2019 1/12/2019 --   Sig - Route: Take 1 capsule (100 mg total) by mouth 2 (two) times daily. for 10 days - Oral     Pharmacy called needing verification for Disp quantity and directions because it does not add up. Pls advise

## 2019-01-02 NOTE — TELEPHONE ENCOUNTER
----- Message from Andrae Gonzalez sent at 1/2/2019 12:03 PM CST -----  Contact: self [phone]  Patient stated pharmacy is waiting   on calcification on medication nitrofurantoin, macrocrystal-monohydrate, (MACROBID) 100 MG capsule . Patient stated she is at the pharmacy now. please call and advise, Thanks

## 2019-01-02 NOTE — TELEPHONE ENCOUNTER
Called and spoke to pharmacy and they stated they needed verification on directions and quantity for pt's medication. Sent urgent msg to Dr. Salguero for verification. Will call pt once verified

## 2019-01-03 ENCOUNTER — PATIENT MESSAGE (OUTPATIENT)
Dept: ORTHOPEDICS | Facility: CLINIC | Age: 54
End: 2019-01-03

## 2019-01-03 ENCOUNTER — TELEPHONE (OUTPATIENT)
Dept: INTERNAL MEDICINE | Facility: CLINIC | Age: 54
End: 2019-01-03

## 2019-01-03 NOTE — TELEPHONE ENCOUNTER
Called and LM advising pt that medication had been verified with dosage and quantity yesterday and should be ready to be picked up, advised to return my call if any problems

## 2019-01-03 NOTE — TELEPHONE ENCOUNTER
----- Message from Damián Marquez sent at 1/2/2019  3:25 PM CST -----  Contact: Patient [phone]  Patient calling to check the status of the Dosage not quantity of the Rx   Requesting office to give a call to pharmacy asap, stating pharmacy will be closing soon.    Previous message.    Please call an advise  Thank you

## 2019-01-04 ENCOUNTER — TELEPHONE (OUTPATIENT)
Dept: INTERNAL MEDICINE | Facility: CLINIC | Age: 54
End: 2019-01-04

## 2019-01-04 NOTE — TELEPHONE ENCOUNTER
----- Message from Megan Cortes sent at 1/4/2019  3:48 PM CST -----  Contact: Patient [phone]      ----- Message -----  From: Aleisha Dueñas  Sent: 1/4/2019   3:29 PM  To: Nico Gimenez Staff    Patient would like to get medical advice.    Symptoms (please be specific):  Pain in right knee    How long has patient had these symptoms:  2 months    Pharmacy name and phone # [facility] [place] - [address] [facility] [phone] (Phone) [phone] (Fax)    Comments:  The triage nurse told her to ask you for something for pain.  She said to look at the xray that was ordered by Dr. Crews on 12/13/18

## 2019-01-05 LAB — BACTERIA UR CULT: NORMAL

## 2019-01-06 RX ORDER — DICLOFENAC SODIUM 10 MG/G
2 GEL TOPICAL 4 TIMES DAILY
Qty: 1 TUBE | Refills: 2 | Status: SHIPPED | OUTPATIENT
Start: 2019-01-06 | End: 2020-07-18

## 2019-01-06 NOTE — TELEPHONE ENCOUNTER
She needs to follow up with orhto for an injection.  She may try Diclofenac gel - to be applied to knee, in the meantime.

## 2019-01-07 ENCOUNTER — PATIENT MESSAGE (OUTPATIENT)
Dept: ORTHOPEDICS | Facility: CLINIC | Age: 54
End: 2019-01-07

## 2019-01-07 NOTE — TELEPHONE ENCOUNTER
Called and spoke to pt and advised of msg below. Pt will call ortho to sched and will try Diclofenac gel - to be applied to knee, in the meantime.

## 2019-01-10 ENCOUNTER — PATIENT MESSAGE (OUTPATIENT)
Dept: INTERNAL MEDICINE | Facility: CLINIC | Age: 54
End: 2019-01-10

## 2019-02-07 RX ORDER — CYPROHEPTADINE HYDROCHLORIDE 4 MG/1
TABLET ORAL
Qty: 60 TABLET | Refills: 1 | Status: SHIPPED | OUTPATIENT
Start: 2019-02-07 | End: 2019-04-17 | Stop reason: SDUPTHER

## 2019-02-07 RX ORDER — LORAZEPAM 0.5 MG/1
TABLET ORAL
Qty: 30 TABLET | Refills: 0 | Status: SHIPPED | OUTPATIENT
Start: 2019-02-07 | End: 2019-03-12 | Stop reason: SDUPTHER

## 2019-03-11 ENCOUNTER — PATIENT MESSAGE (OUTPATIENT)
Dept: INTERNAL MEDICINE | Facility: CLINIC | Age: 54
End: 2019-03-11

## 2019-03-11 DIAGNOSIS — Z12.39 BREAST SCREENING: Primary | ICD-10-CM

## 2019-03-12 RX ORDER — LORAZEPAM 0.5 MG/1
TABLET ORAL
Qty: 30 TABLET | Refills: 0 | Status: SHIPPED | OUTPATIENT
Start: 2019-03-12 | End: 2019-03-13 | Stop reason: SDUPTHER

## 2019-03-12 NOTE — TELEPHONE ENCOUNTER
Spoke with pt, she did not give me any other details other than she has been having vaginal dryness for a couple months and would like something called in for it

## 2019-03-13 RX ORDER — LORAZEPAM 0.5 MG/1
0.5 TABLET ORAL DAILY
Qty: 30 TABLET | Refills: 0 | Status: SHIPPED | OUTPATIENT
Start: 2019-03-13 | End: 2019-04-17 | Stop reason: SDUPTHER

## 2019-03-13 NOTE — TELEPHONE ENCOUNTER
----- Message from Maura Buchanan sent at 3/13/2019  3:28 PM CDT -----  Contact: self [phone]  Type: Rx    Name of medication(s):  LORazepam (ATIVAN) 0.5 MG tablet    Is this a refill? New rx? New    Who prescribed medication?Noemy    Pharmacy Name, Phone, & Location: [facility] [place], [place] - [address]    Comments: patient is switching pharmacies, she no longer uses the [facility] and [facility] will not transfer the above medication to her new pharmacy, so she's asking for a new prescription to be sent over.    Thank you

## 2019-03-13 NOTE — TELEPHONE ENCOUNTER
patient is switching pharmacies, [facility] the [facility] and [facility] not [facility] the above medication to her [facility] pharmacy, so she's asking for a new prescription to be sent over.

## 2019-03-14 ENCOUNTER — PATIENT MESSAGE (OUTPATIENT)
Dept: INTERNAL MEDICINE | Facility: CLINIC | Age: 54
End: 2019-03-14

## 2019-04-17 RX ORDER — CYPROHEPTADINE HYDROCHLORIDE 4 MG/1
TABLET ORAL
Qty: 60 TABLET | Refills: 1 | Status: SHIPPED | OUTPATIENT
Start: 2019-04-17 | End: 2019-08-30 | Stop reason: SDUPTHER

## 2019-04-17 RX ORDER — LORAZEPAM 0.5 MG/1
0.5 TABLET ORAL DAILY
Qty: 30 TABLET | Refills: 0 | Status: SHIPPED | OUTPATIENT
Start: 2019-04-17 | End: 2019-05-22 | Stop reason: SDUPTHER

## 2019-05-22 RX ORDER — LORAZEPAM 0.5 MG/1
0.5 TABLET ORAL DAILY
Qty: 30 TABLET | Refills: 2 | Status: SHIPPED | OUTPATIENT
Start: 2019-05-22 | End: 2020-01-10

## 2019-06-28 ENCOUNTER — OCCUPATIONAL HEALTH (OUTPATIENT)
Dept: URGENT CARE | Facility: CLINIC | Age: 54
End: 2019-06-28

## 2019-06-28 DIAGNOSIS — Z00.00 PE (PHYSICAL EXAM), ROUTINE: Primary | ICD-10-CM

## 2019-06-28 PROCEDURE — 99499 UNLISTED E&M SERVICE: CPT | Mod: S$GLB,,, | Performed by: NURSE PRACTITIONER

## 2019-06-28 PROCEDURE — 99499 DOT PHYSICAL: ICD-10-PCS | Mod: S$GLB,,, | Performed by: NURSE PRACTITIONER

## 2019-08-03 LAB — HM MAMMOGRAM: NORMAL

## 2019-09-01 RX ORDER — CYPROHEPTADINE HYDROCHLORIDE 4 MG/1
TABLET ORAL
Qty: 60 TABLET | Refills: 1 | Status: SHIPPED | OUTPATIENT
Start: 2019-09-01 | End: 2019-11-08 | Stop reason: SDUPTHER

## 2019-11-07 ENCOUNTER — PATIENT MESSAGE (OUTPATIENT)
Dept: INTERNAL MEDICINE | Facility: CLINIC | Age: 54
End: 2019-11-07

## 2019-11-08 RX ORDER — CYPROHEPTADINE HYDROCHLORIDE 4 MG/1
4 TABLET ORAL 2 TIMES DAILY PRN
Qty: 60 TABLET | Refills: 1 | Status: SHIPPED | OUTPATIENT
Start: 2019-11-08 | End: 2020-04-09 | Stop reason: SDUPTHER

## 2019-11-15 ENCOUNTER — PATIENT MESSAGE (OUTPATIENT)
Dept: INTERNAL MEDICINE | Facility: CLINIC | Age: 54
End: 2019-11-15

## 2019-11-15 DIAGNOSIS — L98.9 SKIN LESION: Primary | ICD-10-CM

## 2019-12-09 ENCOUNTER — PATIENT MESSAGE (OUTPATIENT)
Dept: ADMINISTRATIVE | Facility: OTHER | Age: 54
End: 2019-12-09

## 2019-12-27 ENCOUNTER — TELEPHONE (OUTPATIENT)
Dept: DERMATOLOGY | Facility: CLINIC | Age: 54
End: 2019-12-27

## 2019-12-27 NOTE — TELEPHONE ENCOUNTER
----- Message from Chel Walsh sent at 12/27/2019  1:06 PM CST -----  Contact: pt   Patient Requesting Sooner Appointment.     Reason for sooner appt.: skin lesions   When is the first available appointment? 1/10  Communication Preference:[phone]   Additional Information: sooner appt

## 2020-01-08 ENCOUNTER — PATIENT OUTREACH (OUTPATIENT)
Dept: ADMINISTRATIVE | Facility: OTHER | Age: 55
End: 2020-01-08

## 2020-01-10 ENCOUNTER — INITIAL CONSULT (OUTPATIENT)
Dept: DERMATOLOGY | Facility: CLINIC | Age: 55
End: 2020-01-10
Payer: COMMERCIAL

## 2020-01-10 VITALS — WEIGHT: 163 LBS | BODY MASS INDEX: 31.83 KG/M2

## 2020-01-10 DIAGNOSIS — Z87.2 HISTORY OF DERMATITIS: ICD-10-CM

## 2020-01-10 DIAGNOSIS — L72.9 BENIGN CYST OF SKIN: ICD-10-CM

## 2020-01-10 DIAGNOSIS — D22.9 NEVUS: ICD-10-CM

## 2020-01-10 DIAGNOSIS — L85.3 XEROSIS CUTIS: ICD-10-CM

## 2020-01-10 DIAGNOSIS — L70.9 ACNE, UNSPECIFIED ACNE TYPE: Primary | ICD-10-CM

## 2020-01-10 PROCEDURE — 99999 PR PBB SHADOW E&M-EST. PATIENT-LVL III: ICD-10-PCS | Mod: PBBFAC,,, | Performed by: DERMATOLOGY

## 2020-01-10 PROCEDURE — 3008F BODY MASS INDEX DOCD: CPT | Mod: CPTII,S$GLB,, | Performed by: DERMATOLOGY

## 2020-01-10 PROCEDURE — 99202 PR OFFICE/OUTPT VISIT, NEW, LEVL II, 15-29 MIN: ICD-10-PCS | Mod: S$GLB,,, | Performed by: DERMATOLOGY

## 2020-01-10 PROCEDURE — 99999 PR PBB SHADOW E&M-EST. PATIENT-LVL III: CPT | Mod: PBBFAC,,, | Performed by: DERMATOLOGY

## 2020-01-10 PROCEDURE — 99202 OFFICE O/P NEW SF 15 MIN: CPT | Mod: S$GLB,,, | Performed by: DERMATOLOGY

## 2020-01-10 PROCEDURE — 3008F PR BODY MASS INDEX (BMI) DOCUMENTED: ICD-10-PCS | Mod: CPTII,S$GLB,, | Performed by: DERMATOLOGY

## 2020-01-10 RX ORDER — ACETAMINOPHEN AND CODEINE PHOSPHATE 300; 30 MG/1; MG/1
1 TABLET ORAL 2 TIMES DAILY PRN
COMMUNITY
Start: 2020-01-02 | End: 2020-07-18

## 2020-01-10 RX ORDER — METHOCARBAMOL 750 MG/1
TABLET, FILM COATED ORAL
COMMUNITY
Start: 2019-12-30 | End: 2020-07-18

## 2020-01-10 RX ORDER — BUTALBITAL, ACETAMINOPHEN AND CAFFEINE 50; 325; 40 MG/1; MG/1; MG/1
TABLET ORAL
COMMUNITY
Start: 2019-12-27 | End: 2020-07-18

## 2020-01-10 RX ORDER — TAZAROTENE 1 MG/G
CREAM TOPICAL
Qty: 30 G | Refills: 3 | Status: SHIPPED | OUTPATIENT
Start: 2020-01-10 | End: 2020-07-18

## 2020-01-10 RX ORDER — TOBRAMYCIN 3 MG/ML
SOLUTION/ DROPS OPHTHALMIC
Refills: 0 | COMMUNITY
Start: 2019-10-09 | End: 2020-07-18

## 2020-01-10 NOTE — LETTER
January 10, 2020      Jannette Salguero MD  [address] [facility] [address]           [place] - Dermatology  [address] [facility]  [place] [address]  Phone: [phone]  Fax: [phone]          Patient: Jeannie Saavedra   MR Number: [id number]   YOB: 1965   Date of Visit: 1/10/2020       Dear Dr. Jannette Salguero:    Thank you for referring Jeannie Saavedra to me for evaluation. Attached you will find relevant portions of my assessment and plan of care.    If you have questions, please do not hesitate to call me. I look forward to following Jeannie Saavedra along with you.    Sincerely,    Caitlin Leslie MD    Enclosure  CC:  No Recipients    If you would like to receive this communication electronically, please contact [email or web address] or [phone] to request more information on [employer] access.    For providers and/or their staff who would like to refer a patient to [facility], please contact us through our one-stop-shop provider referral line, [facility], at [phone].    If you feel you have received this communication in error or would no longer like to receive these types of communications, please e-mail [email or web address]

## 2020-01-10 NOTE — PROGRESS NOTES
Subjective:       Patient ID:  Jeannie Saavedra is a 54 y.o. female who presents for   Chief Complaint   Patient presents with    Lesion     bilateral breast     Face broken out for over a year would like tx.  Also dry skin on legs.     Lesion  - Initial  Affected locations: chest  Signs / symptoms: asymptomatic  Aggravated by: nothing  Relieving factors/Treatments tried: nothing        Review of Systems   Constitutional: Negative for fever, chills, weight loss, weight gain, fatigue, night sweats and malaise.   Skin: Positive for activity-related sunscreen use and wears hat. Negative for daily sunscreen use.   Hematologic/Lymphatic: Bruises/bleeds easily.        Objective:    Physical Exam   Constitutional: She appears well-developed and well-nourished.   Neurological: She is alert and oriented to person, place, and time.   Psychiatric: She has a normal mood and affect.   Skin:   Areas Examined (abnormalities noted in diagram):   Head / Face Inspection Performed  Neck Inspection Performed  RUE Inspected  LUE Inspection Performed  RLE Inspected  LLE Inspection Performed                   Diagram Legend     Erythematous scaling macule/papule c/w actinic keratosis       Vascular papule c/w angioma      Pigmented verrucoid papule/plaque c/w seborrheic keratosis      Yellow umbilicated papule c/w sebaceous hyperplasia      Irregularly shaped tan macule c/w lentigo     1-2 mm smooth white papules consistent with Milia      Movable subcutaneous cyst with punctum c/w epidermal inclusion cyst      Subcutaneous movable cyst c/w pilar cyst      Firm pink to brown papule c/w dermatofibroma      Pedunculated fleshy papule(s) c/w skin tag(s)      Evenly pigmented macule c/w junctional nevus     Mildly variegated pigmented, slightly irregular-bordered macule c/w mildly atypical nevus      Flesh colored to evenly pigmented papule c/w intradermal nevus       Pink pearly papule/plaque c/w basal cell carcinoma      Erythematous  hyperkeratotic cursted plaque c/w SCC      Surgical scar with no sign of skin cancer recurrence      Open and closed comedones      Inflammatory papules and pustules      Verrucoid papule consistent consistent with wart     Erythematous eczematous patches and plaques     Dystrophic onycholytic nail with subungual debris c/w onychomycosis     Umbilicated papule    Erythematous-base heme-crusted tan verrucoid plaque consistent with inflamed seborrheic keratosis     Erythematous Silvery Scaling Plaque c/w Psoriasis     See annotation      Assessment / Plan:        Acne, unspecified acne type  -     tazarotene (TAZORAC) 0.1 % cream; Use hs on face  Dispense: 30 g; Refill: 3    History of dermatitis chest  Photos reviewed, probable intertrigo  Will use zeasorb powder for prvention    Nevus  reassurance      Benign cyst of skin for over 6 years  reassurance  Relates smaller than visit with Dr Seymour previously 2014,  does not bother her  Call if enlarges or is tender    Xerosis cutis  Am lactin lotion             Follow up in about 1 year (around 1/10/2021).

## 2020-01-14 ENCOUNTER — PATIENT MESSAGE (OUTPATIENT)
Dept: DERMATOLOGY | Facility: CLINIC | Age: 55
End: 2020-01-14

## 2020-01-15 ENCOUNTER — PATIENT MESSAGE (OUTPATIENT)
Dept: ADMINISTRATIVE | Facility: HOSPITAL | Age: 55
End: 2020-01-15

## 2020-01-15 ENCOUNTER — PATIENT OUTREACH (OUTPATIENT)
Dept: ADMINISTRATIVE | Facility: HOSPITAL | Age: 55
End: 2020-01-15

## 2020-01-15 RX ORDER — AMMONIUM LACTATE 12 G/100G
LOTION TOPICAL
Qty: 225 G | Refills: 6 | Status: SHIPPED | OUTPATIENT
Start: 2020-01-15 | End: 2020-12-17

## 2020-01-15 NOTE — LETTER
AUTHORIZATION FOR RELEASE OF   CONFIDENTIAL INFORMATION    Dear DIS,    We are seeing Jeannie Saavedra, date of birth 1965, in the clinic at [facility]. Jannette Salguero MD is the patient's PCP. Jeannie Saavedra has an outstanding lab/procedure at the time we reviewed her chart. In order to help keep her health information updated, she has authorized us to request the following medical record(s):        (X )  MAMMOGRAM                                     (  )  COLONOSCOPY      (  )  PAP SMEAR                                          (  )  OUTSIDE LAB RESULTS     (  )  DEXA SCAN                                          (  )  EYE EXAM            (  )  FOOT EXAM                                          (  )  ENTIRE RECORD     (  )  OUTSIDE IMMUNIZATIONS                 (  )  _______________         Please fax records to [facility], Nona K Epstein, MD, [phone].     If you have any questions, please contact BAILEE Cordova at [phone].           Patient Name: Jeannie Saavedra  : 1965  Patient Phone #: [phone]

## 2020-01-16 ENCOUNTER — PATIENT OUTREACH (OUTPATIENT)
Dept: ADMINISTRATIVE | Facility: HOSPITAL | Age: 55
End: 2020-01-16

## 2020-04-10 RX ORDER — CYPROHEPTADINE HYDROCHLORIDE 4 MG/1
4 TABLET ORAL 2 TIMES DAILY PRN
Qty: 60 TABLET | Refills: 1 | Status: SHIPPED | OUTPATIENT
Start: 2020-04-10 | End: 2020-06-05

## 2020-04-10 NOTE — PROGRESS NOTES
Refill Routing Note     Medication(s) are not appropriate for processing by [facility]:    Medication Outside of Protocol    Appointments  past 12m or future 3m with PCP    Date Provider   Last Visit   1/2/2019 Jannette Salguero MD   Next Visit   Visit date not found Jannette Salguero MD           Automatic Epic Protocol Generated Data:    Requested Prescriptions   Pending Prescriptions Disp Refills    cyproheptadine (PERIACTIN) 4 mg tablet 60 tablet 1     Sig: Take 1 tablet (4 mg total) by mouth 2 (two) times daily as needed.       Ear, Nose, and Throat:  Antihistamines Failed - 4/9/2020  8:50 AM        Failed - An appropriate indication is on the problem list     Allergic Rhinitis  Sinusitis  Seasonal Allergies              Failed - Office visit in past 12 months or future 90 days.     Recent Outpatient Visits            1 year ago Dysuria    [facility] Jannette Salguero MD    1 year ago Primary osteoarthritis of right knee    [facility] Ann Crews PA-C    1 year ago Viral URI with cough    [facility] Jannette Salguero MD    1 year ago Skin lesion    [facility] Jannette Salguero MD    1 year ago Perianal venous thrombosis    [facility] Cali Ramos MD                    Passed - Patient is at least 18 years old           Note composed:3:36 PM 04/10/2020

## 2020-06-04 ENCOUNTER — PATIENT OUTREACH (OUTPATIENT)
Dept: ADMINISTRATIVE | Facility: HOSPITAL | Age: 55
End: 2020-06-04

## 2020-06-04 ENCOUNTER — TELEPHONE (OUTPATIENT)
Dept: ADMINISTRATIVE | Facility: HOSPITAL | Age: 55
End: 2020-06-04

## 2020-06-10 ENCOUNTER — PATIENT MESSAGE (OUTPATIENT)
Dept: INTERNAL MEDICINE | Facility: CLINIC | Age: 55
End: 2020-06-10

## 2020-06-11 RX ORDER — LORAZEPAM 0.5 MG/1
0.5 TABLET ORAL EVERY 12 HOURS PRN
Qty: 20 TABLET | Refills: 0 | Status: SHIPPED | OUTPATIENT
Start: 2020-06-11 | End: 2020-07-11

## 2020-06-11 RX ORDER — LORAZEPAM 0.5 MG/1
0.5 TABLET ORAL DAILY
Qty: 30 TABLET | Refills: 0 | Status: SHIPPED | OUTPATIENT
Start: 2020-06-11 | End: 2020-11-03 | Stop reason: CLARIF

## 2020-06-11 NOTE — PROGRESS NOTES
Refill Routing Note    Medication(s) are not appropriate for processing by [facility]:       Outside of protocol           Medication reconciliation completed: No      Automatic Epic Protocol Generated Data:    Requested Prescriptions   Pending Prescriptions Disp Refills    LORazepam (ATIVAN) 0.5 MG tablet [Pharmacy Med Name: LORAZEPAM 0.5MG TABLET] 30 tablet      Sig: TAKE 1 TABLET (0.5 MG TOTAL) BY MOUTH ONCE DAILY.       Anticonvulsants Protocol Failed - 6/10/2020  5:13 PM        Failed - Active on medication list        Failed - Visit with Authorizing provider in past 9 months or upcoming 90 days        Passed - No active pregnancy on record           Appointments  past 12m or future 3m with PCP    Date Provider   Last Visit   1/2/2019 Jannette Salguero MD   Next Visit   Visit date not found Jannette Salguero MD   ED visits in past 90 days: 0     Note composed:11:05 PM 06/10/2020

## 2020-07-08 ENCOUNTER — PATIENT MESSAGE (OUTPATIENT)
Dept: INTERNAL MEDICINE | Facility: CLINIC | Age: 55
End: 2020-07-08

## 2020-07-09 ENCOUNTER — PATIENT MESSAGE (OUTPATIENT)
Dept: INTERNAL MEDICINE | Facility: CLINIC | Age: 55
End: 2020-07-09

## 2020-07-10 ENCOUNTER — OCCUPATIONAL HEALTH (OUTPATIENT)
Dept: URGENT CARE | Facility: CLINIC | Age: 55
End: 2020-07-10

## 2020-07-10 DIAGNOSIS — Z02.89 ENCOUNTER FOR EXAMINATION REQUIRED BY DEPARTMENT OF TRANSPORTATION (DOT): Primary | ICD-10-CM

## 2020-07-10 PROCEDURE — 99499 PHYSICAL, RECERT DOT/CDL: ICD-10-PCS | Mod: S$GLB,,, | Performed by: NURSE PRACTITIONER

## 2020-07-10 PROCEDURE — 99499 UNLISTED E&M SERVICE: CPT | Mod: S$GLB,,, | Performed by: NURSE PRACTITIONER

## 2020-07-18 ENCOUNTER — OFFICE VISIT (OUTPATIENT)
Dept: INTERNAL MEDICINE | Facility: CLINIC | Age: 55
End: 2020-07-18
Payer: COMMERCIAL

## 2020-07-18 ENCOUNTER — LAB VISIT (OUTPATIENT)
Dept: LAB | Facility: HOSPITAL | Age: 55
End: 2020-07-18
Attending: INTERNAL MEDICINE
Payer: COMMERCIAL

## 2020-07-18 VITALS
OXYGEN SATURATION: 99 % | HEIGHT: 60 IN | BODY MASS INDEX: 33.55 KG/M2 | HEART RATE: 76 BPM | SYSTOLIC BLOOD PRESSURE: 116 MMHG | WEIGHT: 170.88 LBS | DIASTOLIC BLOOD PRESSURE: 80 MMHG

## 2020-07-18 DIAGNOSIS — Z00.00 ANNUAL PHYSICAL EXAM: Primary | ICD-10-CM

## 2020-07-18 DIAGNOSIS — F43.23 ADJUSTMENT DISORDER WITH MIXED ANXIETY AND DEPRESSED MOOD: ICD-10-CM

## 2020-07-18 DIAGNOSIS — Z12.39 BREAST SCREENING: ICD-10-CM

## 2020-07-18 DIAGNOSIS — Z00.00 ANNUAL PHYSICAL EXAM: ICD-10-CM

## 2020-07-18 LAB
25(OH)D3+25(OH)D2 SERPL-MCNC: 29 NG/ML (ref 30–96)
ALBUMIN SERPL BCP-MCNC: 4 G/DL (ref 3.5–5.2)
ALP SERPL-CCNC: 70 U/L (ref 55–135)
ALT SERPL W/O P-5'-P-CCNC: 32 U/L (ref 10–44)
ANION GAP SERPL CALC-SCNC: 6 MMOL/L (ref 8–16)
AST SERPL-CCNC: 30 U/L (ref 10–40)
BASOPHILS # BLD AUTO: 0.02 K/UL (ref 0–0.2)
BASOPHILS NFR BLD: 0.5 % (ref 0–1.9)
BILIRUB SERPL-MCNC: 0.3 MG/DL (ref 0.1–1)
BUN SERPL-MCNC: 16 MG/DL (ref 6–20)
CALCIUM SERPL-MCNC: 9.8 MG/DL (ref 8.7–10.5)
CHLORIDE SERPL-SCNC: 106 MMOL/L (ref 95–110)
CHOLEST SERPL-MCNC: 212 MG/DL (ref 120–199)
CHOLEST/HDLC SERPL: 2.8 {RATIO} (ref 2–5)
CO2 SERPL-SCNC: 29 MMOL/L (ref 23–29)
CREAT SERPL-MCNC: 0.8 MG/DL (ref 0.5–1.4)
DIFFERENTIAL METHOD: ABNORMAL
EOSINOPHIL # BLD AUTO: 0.1 K/UL (ref 0–0.5)
EOSINOPHIL NFR BLD: 1.8 % (ref 0–8)
ERYTHROCYTE [DISTWIDTH] IN BLOOD BY AUTOMATED COUNT: 13.8 % (ref 11.5–14.5)
EST. GFR  (AFRICAN AMERICAN): >60 ML/MIN/1.73 M^2
EST. GFR  (NON AFRICAN AMERICAN): >60 ML/MIN/1.73 M^2
GLUCOSE SERPL-MCNC: 95 MG/DL (ref 70–110)
HCT VFR BLD AUTO: 40.7 % (ref 37–48.5)
HDLC SERPL-MCNC: 77 MG/DL (ref 40–75)
HDLC SERPL: 36.3 % (ref 20–50)
HGB BLD-MCNC: 12.6 G/DL (ref 12–16)
IMM GRANULOCYTES # BLD AUTO: 0.01 K/UL (ref 0–0.04)
IMM GRANULOCYTES NFR BLD AUTO: 0.3 % (ref 0–0.5)
LDLC SERPL CALC-MCNC: 124.4 MG/DL (ref 63–159)
LYMPHOCYTES # BLD AUTO: 1.6 K/UL (ref 1–4.8)
LYMPHOCYTES NFR BLD: 39.3 % (ref 18–48)
MCH RBC QN AUTO: 28.4 PG (ref 27–31)
MCHC RBC AUTO-ENTMCNC: 31 G/DL (ref 32–36)
MCV RBC AUTO: 92 FL (ref 82–98)
MONOCYTES # BLD AUTO: 0.4 K/UL (ref 0.3–1)
MONOCYTES NFR BLD: 9.5 % (ref 4–15)
NEUTROPHILS # BLD AUTO: 2 K/UL (ref 1.8–7.7)
NEUTROPHILS NFR BLD: 48.6 % (ref 38–73)
NONHDLC SERPL-MCNC: 135 MG/DL
NRBC BLD-RTO: 0 /100 WBC
PLATELET # BLD AUTO: 331 K/UL (ref 150–350)
PMV BLD AUTO: 10.3 FL (ref 9.2–12.9)
POTASSIUM SERPL-SCNC: 4.9 MMOL/L (ref 3.5–5.1)
PROT SERPL-MCNC: 7 G/DL (ref 6–8.4)
RBC # BLD AUTO: 4.43 M/UL (ref 4–5.4)
SODIUM SERPL-SCNC: 141 MMOL/L (ref 136–145)
TRIGL SERPL-MCNC: 53 MG/DL (ref 30–150)
TSH SERPL DL<=0.005 MIU/L-ACNC: 0.84 UIU/ML (ref 0.4–4)
WBC # BLD AUTO: 4 K/UL (ref 3.9–12.7)

## 2020-07-18 PROCEDURE — 85025 COMPLETE CBC W/AUTO DIFF WBC: CPT

## 2020-07-18 PROCEDURE — 3008F PR BODY MASS INDEX (BMI) DOCUMENTED: ICD-10-PCS | Mod: CPTII,S$GLB,, | Performed by: INTERNAL MEDICINE

## 2020-07-18 PROCEDURE — 86703 HIV-1/HIV-2 1 RESULT ANTBDY: CPT

## 2020-07-18 PROCEDURE — 99396 PREV VISIT EST AGE 40-64: CPT | Mod: S$GLB,,, | Performed by: INTERNAL MEDICINE

## 2020-07-18 PROCEDURE — 99999 PR PBB SHADOW E&M-EST. PATIENT-LVL IV: ICD-10-PCS | Mod: PBBFAC,,, | Performed by: INTERNAL MEDICINE

## 2020-07-18 PROCEDURE — 99396 PR PREVENTIVE VISIT,EST,40-64: ICD-10-PCS | Mod: S$GLB,,, | Performed by: INTERNAL MEDICINE

## 2020-07-18 PROCEDURE — 3008F BODY MASS INDEX DOCD: CPT | Mod: CPTII,S$GLB,, | Performed by: INTERNAL MEDICINE

## 2020-07-18 PROCEDURE — 84443 ASSAY THYROID STIM HORMONE: CPT

## 2020-07-18 PROCEDURE — 99999 PR PBB SHADOW E&M-EST. PATIENT-LVL IV: CPT | Mod: PBBFAC,,, | Performed by: INTERNAL MEDICINE

## 2020-07-18 PROCEDURE — 82306 VITAMIN D 25 HYDROXY: CPT

## 2020-07-18 PROCEDURE — 80053 COMPREHEN METABOLIC PANEL: CPT

## 2020-07-18 PROCEDURE — 36415 COLL VENOUS BLD VENIPUNCTURE: CPT

## 2020-07-18 PROCEDURE — 80061 LIPID PANEL: CPT

## 2020-07-18 RX ORDER — TRAZODONE HYDROCHLORIDE 50 MG/1
TABLET ORAL
Qty: 60 TABLET | Refills: 3 | Status: SHIPPED | OUTPATIENT
Start: 2020-07-18 | End: 2020-11-03 | Stop reason: CLARIF

## 2020-07-18 RX ORDER — MELOXICAM 15 MG/1
15 TABLET ORAL DAILY
Qty: 30 TABLET | Refills: 5 | Status: SHIPPED | OUTPATIENT
Start: 2020-07-18 | End: 2020-12-17

## 2020-07-18 RX ORDER — ESCITALOPRAM OXALATE 10 MG/1
10 TABLET ORAL DAILY
Qty: 30 TABLET | Refills: 5 | Status: SHIPPED | OUTPATIENT
Start: 2020-07-18 | End: 2020-11-03 | Stop reason: CLARIF

## 2020-07-18 NOTE — PATIENT INSTRUCTIONS
Trazadone 1-2 tabs as needed for sleep    Lexapro for anxiety and depression.      Half a tab daily for first week, then increase to 1 tab daily.

## 2020-07-18 NOTE — PROGRESS NOTES
Subjective:       Patient ID: Jeannie Saavedra is a 54 y.o. female.    Chief Complaint: Annual Exam    HPI   C/o anxiety.  Depressed at times.  42 yo niece  of MI in April.  She is worried about pandemic.  No episodes of panic.  Cries when she thinks about her niece.  Awakens in early morning hours and can't fall asleep again.  Appetite ok  No anhedonia.  Focus is normal.  She is a  for [employer], and she is anxious about returning to work.    BMI 33 - up 9 lbs /last year.    C/o pain of back, knees.      Review of Systems   Constitutional: Negative for fever and unexpected weight change.   HENT: Negative for nasal congestion and postnasal drip.    Respiratory: Negative for chest tightness, shortness of breath and wheezing.    Cardiovascular: Negative for chest pain and leg swelling.   Gastrointestinal: Negative for abdominal pain, anal bleeding, constipation, diarrhea, nausea and vomiting.   Genitourinary: Negative for dysuria and urgency.   Integumentary:  Negative for rash.   Neurological: Negative for headaches.   Psychiatric/Behavioral: Negative for dysphoric mood and sleep disturbance. The patient is not nervous/anxious.          Objective:      Physical Exam  Constitutional:       Appearance: She is well-developed.   Eyes:      General: No scleral icterus.  Neck:      Thyroid: No thyromegaly.      Vascular: No JVD.   Cardiovascular:      Rate and Rhythm: Normal rate and regular rhythm.      Heart sounds: Normal heart sounds.   Pulmonary:      Effort: Pulmonary effort is normal. No respiratory distress.      Breath sounds: Normal breath sounds. No wheezing or rales.   Abdominal:      Palpations: Abdomen is soft. There is no mass.      Tenderness: There is no abdominal tenderness.   Neurological:      Mental Status: She is alert and oriented to person, place, and time.   Psychiatric:         Behavior: Behavior normal.         Assessment:       1. Annual physical exam    2.  Adjustment disorder with mixed anxiety and depressed mood    3. Breast screening    4. BMI 30.0-30.9,adult        Plan:       Jeannie was seen today for annual exam.    Diagnoses and all orders for this visit:    Annual physical exam  -     CBC auto differential; Future  -     Comprehensive metabolic panel; Future  -     Lipid Panel; Future  -     TSH; Future  -     HIV 1/2 Ag/Ab (4th Gen); Future  -     Vitamin D; Future    Adjustment disorder with mixed anxiety and depressed mood  -     Ambulatory referral/consult to Behavioral Health; Future    Breast screening  -     Mammo Digital Screening Bilat w/ New; Future  -     Mammo Digital Screening Bilat w/ New    BMI 30.0-30.9,adult    Other orders  -     escitalopram oxalate (LEXAPRO) 10 MG tablet; Take 1 tablet (10 mg total) by mouth once daily.  -     meloxicam (MOBIC) 15 MG tablet; Take 1 tablet (15 mg total) by mouth once daily.  -     traZODone (DESYREL) 50 MG tablet; 1-2 tabs po q hs for sleep       Ortho offered    Wt loss         3

## 2020-07-19 ENCOUNTER — PATIENT MESSAGE (OUTPATIENT)
Dept: INTERNAL MEDICINE | Facility: CLINIC | Age: 55
End: 2020-07-19

## 2020-07-20 LAB — HIV 1+2 AB+HIV1 P24 AG SERPL QL IA: NEGATIVE

## 2020-08-18 ENCOUNTER — PATIENT MESSAGE (OUTPATIENT)
Dept: INTERNAL MEDICINE | Facility: CLINIC | Age: 55
End: 2020-08-18

## 2020-08-18 ENCOUNTER — OFFICE VISIT (OUTPATIENT)
Dept: INTERNAL MEDICINE | Facility: CLINIC | Age: 55
End: 2020-08-18
Payer: COMMERCIAL

## 2020-08-18 VITALS
HEART RATE: 87 BPM | DIASTOLIC BLOOD PRESSURE: 88 MMHG | HEIGHT: 60 IN | WEIGHT: 170.63 LBS | BODY MASS INDEX: 33.5 KG/M2 | SYSTOLIC BLOOD PRESSURE: 124 MMHG

## 2020-08-18 DIAGNOSIS — R30.0 DYSURIA: ICD-10-CM

## 2020-08-18 DIAGNOSIS — M54.2 NECK PAIN: Primary | ICD-10-CM

## 2020-08-18 DIAGNOSIS — R20.0 BILATERAL HAND NUMBNESS: ICD-10-CM

## 2020-08-18 DIAGNOSIS — M54.2 CERVICALGIA: ICD-10-CM

## 2020-08-18 DIAGNOSIS — M54.9 DORSALGIA, UNSPECIFIED: ICD-10-CM

## 2020-08-18 DIAGNOSIS — M54.9 BACK PAIN, UNSPECIFIED BACK LOCATION, UNSPECIFIED BACK PAIN LATERALITY, UNSPECIFIED CHRONICITY: ICD-10-CM

## 2020-08-18 PROCEDURE — 87088 URINE BACTERIA CULTURE: CPT

## 2020-08-18 PROCEDURE — 87077 CULTURE AEROBIC IDENTIFY: CPT

## 2020-08-18 PROCEDURE — 81001 URINALYSIS AUTO W/SCOPE: CPT

## 2020-08-18 PROCEDURE — 99999 PR PBB SHADOW E&M-EST. PATIENT-LVL IV: CPT | Mod: PBBFAC,,, | Performed by: INTERNAL MEDICINE

## 2020-08-18 PROCEDURE — 87186 SC STD MICRODIL/AGAR DIL: CPT

## 2020-08-18 PROCEDURE — 99214 PR OFFICE/OUTPT VISIT, EST, LEVL IV, 30-39 MIN: ICD-10-PCS | Mod: S$GLB,,, | Performed by: INTERNAL MEDICINE

## 2020-08-18 PROCEDURE — 99999 PR PBB SHADOW E&M-EST. PATIENT-LVL IV: ICD-10-PCS | Mod: PBBFAC,,, | Performed by: INTERNAL MEDICINE

## 2020-08-18 PROCEDURE — 3008F BODY MASS INDEX DOCD: CPT | Mod: CPTII,S$GLB,, | Performed by: INTERNAL MEDICINE

## 2020-08-18 PROCEDURE — 99214 OFFICE O/P EST MOD 30 MIN: CPT | Mod: S$GLB,,, | Performed by: INTERNAL MEDICINE

## 2020-08-18 PROCEDURE — 3008F PR BODY MASS INDEX (BMI) DOCUMENTED: ICD-10-PCS | Mod: CPTII,S$GLB,, | Performed by: INTERNAL MEDICINE

## 2020-08-18 PROCEDURE — 87086 URINE CULTURE/COLONY COUNT: CPT

## 2020-08-18 RX ORDER — CYPROHEPTADINE HYDROCHLORIDE 4 MG/1
4 TABLET ORAL 2 TIMES DAILY PRN
Qty: 60 TABLET | Refills: 1 | Status: SHIPPED | OUTPATIENT
Start: 2020-08-18 | End: 2020-12-17

## 2020-08-18 RX ORDER — GABAPENTIN 300 MG/1
CAPSULE ORAL
Qty: 90 CAPSULE | Refills: 2 | Status: SHIPPED | OUTPATIENT
Start: 2020-08-18 | End: 2020-11-03 | Stop reason: CLARIF

## 2020-08-18 NOTE — PROGRESS NOTES
Subjective:       Patient ID: Jeannie Saavedra is a 54 y.o. female.    Chief Complaint: Follow-up    HPI       She has mild dysuria.      More back and neck pain.  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    C.o pain post neck she has degenrative spine.  Pain is worse.  Sometimes hands and L leg tingles.  She has seen back specialists it past and she was told of degenerative discs.    Pain has been more severe for past year.    MRI lumbar spine :   Mild degenerative change with facet joint arthropathy most pronounced at   L5/S1 with mild bilateral foraminal stenosis. No evidence for lumbar disc   herniation or significant central canal stenosis.  She has NADIR in past, which didn't help.    She doesn't think she can afford PT.    She would like to try gabapentin, but at a higher dose than 100 mg dose in past.    trazadone didn't help her sleep.      Review of Systems   Constitutional: Negative for activity change and unexpected weight change.   HENT: Negative for hearing loss, rhinorrhea and trouble swallowing.    Eyes: Negative for discharge and visual disturbance.   Respiratory: Negative for chest tightness and wheezing.    Cardiovascular: Negative for chest pain and palpitations.   Gastrointestinal: Negative for blood in stool, constipation, diarrhea and vomiting.   Endocrine: Negative for polydipsia and polyuria.   Genitourinary: Negative for difficulty urinating, dysuria, hematuria and menstrual problem.   Musculoskeletal: Positive for arthralgias and neck pain. Negative for joint swelling.   Neurological: Positive for headaches.   Psychiatric/Behavioral: Negative for dysphoric mood.         Objective:      Physical Exam  Constitutional:       Appearance: Normal appearance. She is not ill-appearing.   Neurological:      General: No focal deficit present.      Mental Status: She is alert and oriented to person, place, and time.      Deep Tendon Reflexes:       Reflex Scores:       Bicep reflexes are 2+ on the right side and 2+ on the left side.       Brachioradialis reflexes are 2+ on the right side and 2+ on the left side.       Patellar reflexes are 2+ on the right side and 2+ on the left side.       Achilles reflexes are 2+ on the right side and 2+ on the left side.  Psychiatric:         Mood and Affect: Mood normal.         Behavior: Behavior normal.         Assessment:       1. Neck pain    2. Back pain, unspecified back location, unspecified back pain laterality, unspecified chronicity    3. Bilateral hand numbness    4. Dysuria        Plan:       Jeannie was seen today for follow-up.    Diagnoses and all orders for this visit:    Neck pain  -     Ambulatory referral/consult to Physical/Occupational Therapy; Future    Back pain, unspecified back location, unspecified back pain laterality, unspecified chronicity    Bilateral hand numbness  -     EMG W/ ULTRASOUND AND NERVE CONDUCTION TEST 2 Extremities; Future    Dysuria  -     Urinalysis, Reflex to Urine Culture Urine, Clean Catch    Other orders  -     cyproheptadine (PERIACTIN) 4 mg tablet; Take 1 tablet (4 mg total) by mouth 2 (two) times daily as needed.  -     gabapentin (NEURONTIN) 300 MG capsule; 1-2 caps po bid for pain         MRI diagnostic imaging.  We discussed cymbalta- will hold off for now.  Request PsychoTherapy again - she was never contacted.

## 2020-08-19 ENCOUNTER — TELEPHONE (OUTPATIENT)
Dept: INTERNAL MEDICINE | Facility: CLINIC | Age: 55
End: 2020-08-19

## 2020-08-19 ENCOUNTER — PATIENT MESSAGE (OUTPATIENT)
Dept: INTERNAL MEDICINE | Facility: CLINIC | Age: 55
End: 2020-08-19

## 2020-08-19 LAB
BACTERIA #/AREA URNS AUTO: ABNORMAL /HPF
BILIRUB UR QL STRIP: NEGATIVE
CLARITY UR REFRACT.AUTO: ABNORMAL
COLOR UR AUTO: YELLOW
GLUCOSE UR QL STRIP: NEGATIVE
HGB UR QL STRIP: NEGATIVE
KETONES UR QL STRIP: NEGATIVE
LEUKOCYTE ESTERASE UR QL STRIP: ABNORMAL
MICROSCOPIC COMMENT: ABNORMAL
NITRITE UR QL STRIP: POSITIVE
NON-SQ EPI CELLS #/AREA URNS AUTO: 1 /HPF
PH UR STRIP: 6 [PH] (ref 5–8)
PROT UR QL STRIP: NEGATIVE
RBC #/AREA URNS AUTO: 2 /HPF (ref 0–4)
SP GR UR STRIP: 1.02 (ref 1–1.03)
SQUAMOUS #/AREA URNS AUTO: 1 /HPF
URN SPEC COLLECT METH UR: ABNORMAL
WBC #/AREA URNS AUTO: 40 /HPF (ref 0–5)
WBC CLUMPS UR QL AUTO: ABNORMAL

## 2020-08-19 RX ORDER — PHENAZOPYRIDINE HYDROCHLORIDE 200 MG/1
200 TABLET, FILM COATED ORAL
Qty: 9 TABLET | Refills: 1 | Status: SHIPPED | OUTPATIENT
Start: 2020-08-19 | End: 2020-08-29

## 2020-08-19 RX ORDER — NITROFURANTOIN 25; 75 MG/1; MG/1
100 CAPSULE ORAL 2 TIMES DAILY
Qty: 14 CAPSULE | Refills: 0 | Status: SHIPPED | OUTPATIENT
Start: 2020-08-19 | End: 2020-08-26

## 2020-08-19 NOTE — TELEPHONE ENCOUNTER
----- Message from Aranza Mcgrath sent at 8/19/2020  9:23 AM CDT -----  Contact: Jeannie@ [phone]  Needs Advice    Reason for call:      No message left. Just would like a call back.    Communication Preference: Jeannie@ [phone]    Additional Information:    Pt did not want to leave a message

## 2020-08-19 NOTE — TELEPHONE ENCOUNTER
Spoke with pt she's wondering if her test results are in yet. I informed pt that I will inform Dr. Salguero about her request.

## 2020-08-21 LAB — BACTERIA UR CULT: ABNORMAL

## 2020-08-25 ENCOUNTER — TELEPHONE (OUTPATIENT)
Dept: INTERNAL MEDICINE | Facility: CLINIC | Age: 55
End: 2020-08-25

## 2020-08-25 ENCOUNTER — HOSPITAL ENCOUNTER (OUTPATIENT)
Dept: RADIOLOGY | Facility: HOSPITAL | Age: 55
Discharge: HOME | End: 2020-08-25
Attending: INTERNAL MEDICINE
Payer: COMMERCIAL

## 2020-08-25 PROCEDURE — 72141 MRI CERVICAL SPINE WITHOUT CONTRAST: ICD-10-PCS | Mod: 26,,, | Performed by: RADIOLOGY

## 2020-08-25 PROCEDURE — 72148 MRI LUMBAR SPINE W/O DYE: CPT | Mod: TC

## 2020-08-25 PROCEDURE — 72148 MRI LUMBAR SPINE WITHOUT CONTRAST: ICD-10-PCS | Mod: 26,,, | Performed by: RADIOLOGY

## 2020-08-25 PROCEDURE — 72141 MRI NECK SPINE W/O DYE: CPT | Mod: 26,,, | Performed by: RADIOLOGY

## 2020-08-25 PROCEDURE — 72141 MRI NECK SPINE W/O DYE: CPT | Mod: TC

## 2020-08-25 PROCEDURE — 72148 MRI LUMBAR SPINE W/O DYE: CPT | Mod: 26,,, | Performed by: RADIOLOGY

## 2020-08-25 NOTE — TELEPHONE ENCOUNTER
"----- Message from Damián Marquez sent at 8/25/2020 10:44 AM CDT -----  Regarding: Advice  [facility] [phone]  Patient would like to get medical advice.    Comments: calling [facility] is needing clnical notes to show why the patient is needing the "MRI" done of the spine. Fax [phone]  Attn to [facility] [phone]    Please call an advise  Thank you    "

## 2020-08-25 NOTE — TELEPHONE ENCOUNTER
----- Message from Pratibha Moise sent at 8/25/2020  4:46 PM CDT -----  Contact: [facility] [phone]  Requesting an order for MRI Cervical without contrast, lumbar without contrast. Please fax [phone]. Patient has appt tomorrow.     Please call and advise.    Thank You

## 2020-08-26 ENCOUNTER — TELEPHONE (OUTPATIENT)
Dept: INTERNAL MEDICINE | Facility: CLINIC | Age: 55
End: 2020-08-26

## 2020-08-26 DIAGNOSIS — N28.9 RENAL LESION: Primary | ICD-10-CM

## 2020-08-26 NOTE — TELEPHONE ENCOUNTER
----- Message from Aleisha Dueñas sent at 8/26/2020 10:18 AM CDT -----  Regarding: [facility]/ [place] [phone]  Please fax the clinical notes pertaining to why the patient needs the MRI so, they can get authorization from the patient's insurance. Fax to [phone]    The patient's appointment is for 5pm today.    Thank you

## 2020-08-26 NOTE — TELEPHONE ENCOUNTER
These orders were faxed over yesterday and this morning. I also spoke with pt on yesterday to informed her as well.

## 2020-08-28 ENCOUNTER — TELEPHONE (OUTPATIENT)
Dept: INTERNAL MEDICINE | Facility: CLINIC | Age: 55
End: 2020-08-28

## 2020-08-28 DIAGNOSIS — M51.36 DDD (DEGENERATIVE DISC DISEASE), LUMBAR: ICD-10-CM

## 2020-08-28 DIAGNOSIS — M54.2 NECK PAIN: ICD-10-CM

## 2020-08-28 DIAGNOSIS — M54.9 BACK PAIN, UNSPECIFIED BACK LOCATION, UNSPECIFIED BACK PAIN LATERALITY, UNSPECIFIED CHRONICITY: Primary | ICD-10-CM

## 2020-08-28 NOTE — TELEPHONE ENCOUNTER
She has  degeneration of discs due to aging, and MILD  Bulging of 1 lumbar disc L5S1 - changes are subtle.  She is welcome to see a specialist in spine clinic, but they will rec'd PT first.  I'll put in referral if she wants it  thanks

## 2020-08-28 NOTE — TELEPHONE ENCOUNTER
"Notified pt of results and recommendations. scheduled US  Pt says her pain seems more then just arthritis. She says the person who did her imaging said they saw a "bulging disc". Please advise   "

## 2020-08-28 NOTE — TELEPHONE ENCOUNTER
----- Message from Jannette Salguero MD sent at 8/26/2020  4:10 PM CDT -----  Pls call - lumbar spine: mild arthritic changes of spine and degeneration of discs.   MRI of neck - mild degenerative disc changes also.  No dramatic arthritic or other changes.  .    Let's see if Physical Therapy is helpful (already ordered).  She has a known kidney cyst, which is probably what is noted on MRI.  Will recheck to make sure - pls schedule renal u/s.

## 2020-09-09 ENCOUNTER — PROCEDURE VISIT (OUTPATIENT)
Dept: NEUROLOGY | Facility: CLINIC | Age: 55
End: 2020-09-09
Payer: COMMERCIAL

## 2020-09-09 DIAGNOSIS — G56.20 ULNAR NEUROPATHY, UNSPECIFIED LATERALITY: ICD-10-CM

## 2020-09-09 DIAGNOSIS — G56.00 CARPAL TUNNEL SYNDROME, UNSPECIFIED LATERALITY: Primary | ICD-10-CM

## 2020-09-09 DIAGNOSIS — R20.0 BILATERAL HAND NUMBNESS: ICD-10-CM

## 2020-09-09 PROCEDURE — 95913 PR NERVE CONDUCTION STUDY; 13 OR MORE STUDIES: ICD-10-PCS | Mod: S$GLB,,, | Performed by: PSYCHIATRY & NEUROLOGY

## 2020-09-09 PROCEDURE — 95913 NRV CNDJ TEST 13/> STUDIES: CPT | Mod: S$GLB,,, | Performed by: PSYCHIATRY & NEUROLOGY

## 2020-09-09 PROCEDURE — 95886 PR EMG COMPLETE, W/ NERVE CONDUCTION STUDIES, 5+ MUSCLES: ICD-10-PCS | Mod: S$GLB,,, | Performed by: PSYCHIATRY & NEUROLOGY

## 2020-09-09 PROCEDURE — 95886 MUSC TEST DONE W/N TEST COMP: CPT | Mod: S$GLB,,, | Performed by: PSYCHIATRY & NEUROLOGY

## 2020-09-17 ENCOUNTER — PATIENT MESSAGE (OUTPATIENT)
Dept: INTERNAL MEDICINE | Facility: CLINIC | Age: 55
End: 2020-09-17

## 2020-09-23 ENCOUNTER — CLINICAL SUPPORT (OUTPATIENT)
Dept: REHABILITATION | Facility: HOSPITAL | Age: 55
End: 2020-09-23
Payer: COMMERCIAL

## 2020-09-23 DIAGNOSIS — M25.512 ACUTE PAIN OF BOTH SHOULDERS: ICD-10-CM

## 2020-09-23 DIAGNOSIS — M54.2 NECK PAIN: ICD-10-CM

## 2020-09-23 DIAGNOSIS — M54.2 MUSCLE PAIN, CERVICAL: ICD-10-CM

## 2020-09-23 DIAGNOSIS — M54.50 ACUTE BILATERAL LOW BACK PAIN WITHOUT SCIATICA: ICD-10-CM

## 2020-09-23 DIAGNOSIS — M79.18 MUSCLE PAIN, LUMBAR: ICD-10-CM

## 2020-09-23 DIAGNOSIS — M25.511 ACUTE PAIN OF BOTH SHOULDERS: ICD-10-CM

## 2020-09-23 PROCEDURE — 97162 PT EVAL MOD COMPLEX 30 MIN: CPT | Mod: PN

## 2020-09-23 PROCEDURE — 97110 THERAPEUTIC EXERCISES: CPT | Mod: PN

## 2020-09-23 NOTE — PLAN OF CARE
"[facility]  Physical Therapy Initial Evaluation    Name: Jeannie Saavedra  Clinic Number: [id number]    Therapy Diagnosis:   Encounter Diagnoses   Name Primary?    Neck pain     Acute pain of both shoulders     Muscle pain, cervical     Muscle pain, lumbar     Acute bilateral low back pain without sciatica      Physician: Jannette Salguero MD    Physician Orders: PT Eval and Treat   Medical Diagnosis: M54.2 (ICD-10-CM) - Neck pain  Evaluation Date: 2020     Authorization Period: 2020  Plan of Care Certification Period: 2020 to   Visit # / Visits authorized:     Time In:   Time Out:   Total Billable Time: 40 minutes (1 MCE, 1 TE)  Precautions: Standard    Subjective   Jeannie presents to PT today with primary complaint of bilateral neck and shoulder pain.  Reports no pain with neck motion; but very sore to touch.  She does experience pain with movement of her B shoulders.  She voices that her pain is 'probably related to my job".  Secondary complaint of low back pain; also feels like this is related to her job.  Also reports R hand (1st-3rd fingers) and L hand numbness (4-5th fingers).  Voices having a known elbow 'spur'. She is also being worked up for CTS on R hand.     Past Medical History:   Diagnosis Date    Allergy     Asthma     Keloid cicatrix     Nephrolithiasis     Osteoarthritis of lumbar spine     Sciatica      Jeannie Saavedra  has a past surgical history that includes  section; Hysterectomy; and Colonoscopy (N/A, 2018).     Jeannie has a current medication list which includes the following prescription(s): ammonium lactate, cyproheptadine, escitalopram oxalate, gabapentin, lorazepam, meloxicam, and trazodone.    Review of patient's allergies indicates:  No Known Allergies     Imaging: yes; see EMR.   Prior Therapy: none  Social History: Lives at home with her family.    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.  She lives in [place], drives to the "[employer] to  her bus, and then drives to [employer] [facility] and back.  She drives 8-10+ hours per day.   Prior Level of Function: progressive worsening of neck/shoulder pain.  Current Level of Function: She does report feeling stressed by her job that she has been working for 14 years.  She states that she tries to stretch during the day when she can and gets short-term relief at best.  She has also been performing some stretches and postural exercises for her neck and back during the day that her daughter recommended for her.     Pain:  Current 4/10, worst 7/10, best 2/10   Location: bilateral neck/shoulders, midline low back.    Description: sore    Pts goals: 1.  'I don't know if PT is going to help."     Objective     Palpation: TTP supraclavicular soft tissue, inter-scapular soft tissue, and intra-scapular soft tissue B.          Hypertrophied B UTs   Posture:    Heavy chest, heavy arms, increased thoracic kyphosis (upper) with compensatory increase in lower lumbar lordosis     Gross Movement:  -Gait: non-antalgic  -Shoulder flexion: IR bias, lacks full end-range motion  -Reaching behind her back:  L5 level B    Cervicothoracic Range of Motion:    Degrees Pain/Dysfunction   Flexion 45 Pulling sensation   Extension 52 NP   Right Rotation 40 NP   Left Rotation 50 NP   Right Side Bending 45 NP   Left Side Bending 30 Pulling pain and tightness      UE Range of Motion:   Shoulder Left Right Pain/Dysfunction   Shoulder Flexion 150 150    Shoulder Abduction 150 150    Shoulder ER 75 75    Shoulder IR 45 45    Elbow WNL WNL    Wrist WNL WNL      Cervical Strength:  Cervical flexor endurance test: deferred today    Upper Extremity Strength:  RICARDO FELIPE    Shoulder flexion: 3+/5 Shoulder flexion: 3+/5   Shoulder Abduction: 3+/5 Shoulder abduction: 3+/5   Shoulder ER 4/5 Shoulder ER 4/5   Shoulder IR 4/5 Shoulder IR 4/5   Elbow flexion: 4/5 Elbow " flexion: 4/5   Elbow extension: 4/5 Elbow extension: 4/5   Wrist flexion: 5/5 Wrist flexion: 5/5   Wrist extension: 5/5 Wrist extension: 5/5   Thumb extension 4/5 Thumb extenison 4/5   5th digit abd 4/5 5th digit abd 5/5     Special Tests:  -Cervical distraction: -  -Spurling's test: - B    Joint Mobility: mild B glenohumeral joint hypomobility posteriorly and anteriorly   Flexibility: tight lat dorsi B, tight pectorals B, tight UT/LS B  Sensation: Normal light touch sensation C4-T2 throughout BUE      CMS Impairment/Limitation/Restriction for FOTO Neck Survey    Therapist reviewed FOTO scores for Jeannie Saavedra on 9/23/2020.   FOTO documents entered into [email or web address] - see Media section.    Did not capture; next visit.          TREATMENT   Treatment Time In: 1900  Treatment Time Out: 1915  Total Treatment time separate from Evaluation time:15 minutes    Jeannie received therapeutic exercises to develop strength, endurance, ROM, flexibility, posture and core stabilization for 15 minutes including:  - supine towel roll stretch  - chin tucks  - UT stretch  - LS stretch    Home Exercises and Patient Education Provided  Education provided re:   - progress towards goals   - role of therapy in multi - disciplinary team, goals for therapy  - HEP  - need for stretching daily pre- and post-work    Written Home Exercises Provided: yes.  Exercises were reviewed and Jeannie was able to demonstrate them prior to the end of the session.   Pt received a written copy of exercises to perform at home. Jeannie demonstrated good  understanding of the education provided.     Assessment   Jeannie is a 54 y.o. female referred to outpatient Physical Therapy with a medical diagnosis of neck pain. Pt presents with generalized complaints of neck pain, B shoulder, and low back pain.  Noted low mood upon arrival but improved as the evaluation progressed.  According, to the patient she is experiencing daily neck and low back pain that is job related.   When she speaks of her job, she voices increased stress and decreased satisfaction with her work at this time.  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  Clinical examination reveals TTP along her supraclavicular, inter-scapular, and intra-scapular regions B as well hypertrophied supraclavicular musculature, decreased shoulder ROM, decreased cervical ROM, and postural habituation changes.  R hand findings consistent with CTS.  L hand numbness/tingling consistent with ulnar nerve dysfunction.  No reproduction of distal hand symptoms with cervical or shoulder AROM/special testing today.  Overall, while she does have an MSK component to her neck pain/dysfunction, there does appear to be a non-MSK related component.      Pt prognosis is Good.   Pt will benefit from skilled outpatient Physical Therapy to address the deficits stated above and in the chart below, provide pt/family education, and to maximize pt's level of independence.     Plan of care discussed with patient: Yes  Pt's spiritual, cultural and educational needs considered and pt agreeable to plan of care and goals as stated below:     Anticipated Barriers for therapy:  Stress-related component, expectations for PT, attitudes toward PT    Medical Necessity is demonstrated by the following  History  Co-morbidities and personal factors that may impact the plan of care Co-morbidities:   Depression/Anxiety, UTI, prior surgery    Personal Factors:   age  lifestyle  attitudes     moderate   Examination  Body Structures and Functions, activity limitations and participation restrictions that may impact the plan of care Body Regions:   neck  back  upper extremities    Body Systems:    ROM  strength  transfers  transitions  motor control  edema    Participation Restrictions:   See above    Activity limitations:   Learning and applying knowledge  no deficits    General Tasks and Commands  undertaking multiple tasks    Communication  no deficits    Mobility  lifting and  carrying objects  driving (bike, car, motorcycle)    Self care  washing oneself (bathing, drying, washing hands)  dressing    Domestic Life  shopping  cooking  doing house work (cleaning house, washing dishes, laundry)    Interactions/Relationships  no deficits    Life Areas  employment    Community and Social Life  community life  recreation and leisure         moderate   Clinical Presentation evolving clinical presentation with changing clinical characteristics moderate   Decision Making/ Complexity Score: moderate     Goals:  Short Term Goals: 3-4 weeks:  1. Patient will demonstrate 10 degree improvement in cervical LSB and RR  for improved environment visual scanning.   2. Patient will report ability to manage neck pain at work with HEP.   3. Patient will report 50% decrease in daily cervical pain for improved tolerance to ADL performance.    Long Term Goals: 8 weeks:  1. Patient will demonstrate no pain with palpation of cervicoscapular musculature   2. Patient will report < 4/10 cervicoscapular pain at worst following workday       Plan   Certification Period: 9/23/2020 to 11/13/2020.    Outpatient Physical Therapy 2 times weekly for 8 weeks to include the following interventions: Cervical/Lumbar Traction, Electrical Stimulation IFC/NMES, Manual Therapy, Moist Heat/ Ice, Neuromuscular Re-ed, Patient Education, Self Care, Therapeutic Activites and Therapeutic Exercise, IASTM, Dry Needling    Sergio Maxwell, PT

## 2020-09-24 ENCOUNTER — TELEPHONE (OUTPATIENT)
Dept: ORTHOPEDICS | Facility: CLINIC | Age: 55
End: 2020-09-24

## 2020-09-24 ENCOUNTER — PATIENT OUTREACH (OUTPATIENT)
Dept: ADMINISTRATIVE | Facility: OTHER | Age: 55
End: 2020-09-24

## 2020-09-24 DIAGNOSIS — R52 PAIN: Primary | ICD-10-CM

## 2020-09-25 ENCOUNTER — HOSPITAL ENCOUNTER (OUTPATIENT)
Dept: RADIOLOGY | Facility: OTHER | Age: 55
Discharge: HOME | End: 2020-09-25
Attending: PHYSICIAN ASSISTANT
Payer: COMMERCIAL

## 2020-09-25 ENCOUNTER — OFFICE VISIT (OUTPATIENT)
Dept: ORTHOPEDICS | Facility: CLINIC | Age: 55
End: 2020-09-25
Payer: COMMERCIAL

## 2020-09-25 VITALS
BODY MASS INDEX: 33.38 KG/M2 | SYSTOLIC BLOOD PRESSURE: 124 MMHG | WEIGHT: 170 LBS | HEIGHT: 60 IN | HEART RATE: 88 BPM | DIASTOLIC BLOOD PRESSURE: 81 MMHG

## 2020-09-25 DIAGNOSIS — Z01.818 PRE-OPERATIVE CLEARANCE: ICD-10-CM

## 2020-09-25 DIAGNOSIS — R52 PAIN: ICD-10-CM

## 2020-09-25 DIAGNOSIS — G56.22 ULNAR NEUROPATHY OF LEFT UPPER EXTREMITY: ICD-10-CM

## 2020-09-25 DIAGNOSIS — G56.03 BILATERAL CARPAL TUNNEL SYNDROME: ICD-10-CM

## 2020-09-25 PROBLEM — M25.512 ACUTE PAIN OF BOTH SHOULDERS: Status: ACTIVE | Noted: 2020-09-25

## 2020-09-25 PROBLEM — M54.2 NECK PAIN: Status: ACTIVE | Noted: 2020-09-25

## 2020-09-25 PROBLEM — M25.511 ACUTE PAIN OF BOTH SHOULDERS: Status: ACTIVE | Noted: 2020-09-25

## 2020-09-25 PROBLEM — M54.2 MUSCLE PAIN, CERVICAL: Status: ACTIVE | Noted: 2020-09-25

## 2020-09-25 PROBLEM — M79.18 MUSCLE PAIN, LUMBAR: Status: ACTIVE | Noted: 2020-09-25

## 2020-09-25 PROBLEM — M54.50 ACUTE BILATERAL LOW BACK PAIN WITHOUT SCIATICA: Status: ACTIVE | Noted: 2020-09-25

## 2020-09-25 PROCEDURE — 3008F PR BODY MASS INDEX (BMI) DOCUMENTED: ICD-10-PCS | Mod: CPTII,S$GLB,, | Performed by: PHYSICIAN ASSISTANT

## 2020-09-25 PROCEDURE — 73130 X-RAY EXAM OF HAND: CPT | Mod: TC,50,FY

## 2020-09-25 PROCEDURE — 99214 OFFICE O/P EST MOD 30 MIN: CPT | Mod: S$GLB,,, | Performed by: PHYSICIAN ASSISTANT

## 2020-09-25 PROCEDURE — 73130 XR HAND COMPLETE 3 VIEWS BILATERAL: ICD-10-PCS | Mod: 26,50,, | Performed by: RADIOLOGY

## 2020-09-25 PROCEDURE — 99214 PR OFFICE/OUTPT VISIT, EST, LEVL IV, 30-39 MIN: ICD-10-PCS | Mod: S$GLB,,, | Performed by: PHYSICIAN ASSISTANT

## 2020-09-25 PROCEDURE — 99999 PR PBB SHADOW E&M-EST. PATIENT-LVL IV: ICD-10-PCS | Mod: PBBFAC,,, | Performed by: PHYSICIAN ASSISTANT

## 2020-09-25 PROCEDURE — 99999 PR PBB SHADOW E&M-EST. PATIENT-LVL IV: CPT | Mod: PBBFAC,,, | Performed by: PHYSICIAN ASSISTANT

## 2020-09-25 PROCEDURE — 3008F BODY MASS INDEX DOCD: CPT | Mod: CPTII,S$GLB,, | Performed by: PHYSICIAN ASSISTANT

## 2020-09-25 PROCEDURE — 73130 X-RAY EXAM OF HAND: CPT | Mod: 26,50,, | Performed by: RADIOLOGY

## 2020-09-25 NOTE — PROGRESS NOTES
Health Maintenance Due   Topic Date Due    Shingles Vaccine (1 of 2) 12/13/2017    Influenza Vaccine (1) 08/01/2020     Updates were requested from care everywhere.  Chart was reviewed for overdue Proactive [facility] Encounters (DAISY) topics (CRS, Breast Cancer Screening, Eye exam)  Health Maintenance has been updated.  LINKS immunization registry triggered.  Immunizations were reconciled.

## 2020-09-25 NOTE — PROGRESS NOTES
Subjective:      Patient ID: Jeannie Saavedra is a 54 y.o. female.    Chief Complaint: Pain and Numbness of the Right Hand and Pain and Numbness of the Left Hand      HPI  Jeannie Saavedra is a 54 y.o. female presenting today referred by Dr. Salguero for evaluation of carpal and cubital tunnel. Pt reports symptom onset at least one year ago. She reports intermittent numbness and tingling in bilateral upper extremities. Severity is about the same bilaterally. Pt has night time symptoms. She has tried carpal tunnel bracing on the right side in the past which provided minimal relief. Has not had injections. Recent EMG with evidence of severe bilateral carpal tunnel and left cubital tunnel. She reports some neck pain.Pt is a  and does report occasional numbness while driving.     Review of patient's allergies indicates:  No Known Allergies      Current Outpatient Medications   Medication Sig Dispense Refill    ammonium lactate (LAC-HYDRIN) 12 % lotion Apply topically as needed for Dry Skin. 225 g 6    cyproheptadine (PERIACTIN) 4 mg tablet Take 1 tablet (4 mg total) by mouth 2 (two) times daily as needed. 60 tablet 1    meloxicam (MOBIC) 15 MG tablet Take 1 tablet (15 mg total) by mouth once daily. 30 tablet 5    escitalopram oxalate (LEXAPRO) 10 MG tablet Take 1 tablet (10 mg total) by mouth once daily. 30 tablet 5    gabapentin (NEURONTIN) 300 MG capsule 1-2 caps po bid for pain (Patient not taking: Reported on 9/25/2020) 90 capsule 2    LORazepam (ATIVAN) 0.5 MG tablet TAKE 1 TABLET (0.5 MG TOTAL) BY MOUTH ONCE DAILY. (Patient not taking: Reported on 9/25/2020) 30 tablet 0    traZODone (DESYREL) 50 MG tablet 1-2 tabs po q hs for sleep (Patient not taking: Reported on 8/18/2020) 60 tablet 3     No current facility-administered medications for this visit.        Past Medical History:   Diagnosis Date    Allergy     Asthma     Keloid cicatrix     Nephrolithiasis     Osteoarthritis of lumbar spine      Sciatica        Past Surgical History:   Procedure Laterality Date     SECTION      COLONOSCOPY N/A 2018    Procedure: COLONOSCOPY with SOFÍA in 4 weeks;  Surgeon: Cali Ramos MD;  Location: [facility] ([address] [place] [address]);  Service: Endoscopy;  Laterality: N/A;    HYSTERECTOMY      for hypermenorrhea       Review of Systems:  Constitutional: Negative for chills and fever.   Respiratory: Negative for cough and shortness of breath.    Gastrointestinal: Negative for nausea and vomiting.   Skin: Negative for rash.   Neurological: Negative for dizziness and headaches.   Psychiatric/Behavioral: Negative for depression.   MSK as in HPI       OBJECTIVE:     PHYSICAL EXAM:  /81   Pulse 88   Ht 5' (1.524 m)   Wt 77.1 kg (170 lb)   BMI 33.20 kg/m²     GEN:  NAD, well-developed, well-groomed.  NEURO: Awake, alert, and oriented. Normal attention and concentration.    PSYCH: Normal mood and affect. Behavior is normal.  HEENT: No cervical lymphadenopathy noted.  CARDIOVASCULAR: Radial pulses 2+ bilaterally. No LE edema noted.  PULMONARY: Breath sounds normal. No respiratory distress.  SKIN: Intact, no rashes.      MSK:   RUE:  Good active ROM of the wrist and fingers. Positive tinels and durkans at the carpal tunnel. Negative tinels at the cubital tunnel.  AIN/PIN/Radial/Median/Ulnar Nerves assessed in isolation without deficit. Radial & Ulnar arteries palpated 2+. Capillary Refill <3s.    LUE:  Good active ROM of the wrist and fingers. Positive tinels at the cubital tunnel, positive compression test at the cubital tunnel. Negative tinels and durkans at the carpal tunnel on exam today. Mild thenar atrophy. No significant interosseous atrophy noted. AIN/PIN/Radial/Median/Ulnar Nerves assessed in isolation without deficit. Radial & Ulnar arteries palpated 2+. Capillary Refill <3s.      RADIOGRAPHS:  Xray bl hands 20  Impression:  No acute findings or significant degenerative change.  Comments: I  have personally reviewed the imaging and I agree with the above radiologist's report.    EMG 9/9/20  Impression   This is an abnormal study. There is electrophysiologic evidence of:   1. Severe bilateral carpal tunnel syndrome (median neuropathy across the wrist);   2. A primarily demyelinating left ulnar motor neuropathy across the cubital tunnel without evidence of muscle denervation in any of the examined ulnar-innervated muscles in the left forearm or hand.      ASSESSMENT/PLAN:       ICD-10-CM ICD-9-CM   1. Bilateral carpal tunnel syndrome  G56.03 354.0   2. Ulnar neuropathy of left upper extremity  G56.22 354.2       Orders Placed This Encounter    X-Ray Elbow Complete Left     Plan:   -EMG reviewed with pt. Treatment options discussed. She wishes to proceed with a left carpal tunnel release and left ulnar nerve decompression at the elbow. Will perform a right carpal tunnel injection at time of surgery. Consents reviewed and signed in clinic. All questions answered. She will discuss with work and call to choose a surgery date. Discussed risks of waiting/ not proceeding with nerve decompression.  -bl carpal tunnel brace provided today, discussed bracing for cubital tunnel   -RTC post op         The patient indicates understanding of these issues and agrees to the plan.    Marilyn Crowe PA-C  Hand Clinic   [facility] [place]

## 2020-09-25 NOTE — LETTER
September 25, 2020      Jannette Salguero MD  [address]  [facility] [address]           [place] [address]  [address], [address]  [facility] [address]  Phone: [phone]          Patient: Jeannie Saavedra   MR Number: [id number]   YOB: 1965   Date of Visit: 9/25/2020       Dear Dr. Jannette Salguero:    Thank you for referring Jeannie Saavedra to me for evaluation. Attached you will find relevant portions of my assessment and plan of care.    If you have questions, please do not hesitate to call me. I look forward to following Jeannie Saavedra along with you.    Sincerely,    Marilyn Crowe PA-C    Enclosure  CC:  No Recipients    If you would like to receive this communication electronically, please contact [email or web address] or [phone] to request more information on [employer] access.    For providers and/or their staff who would like to refer a patient to [facility], please contact us through our one-stop-shop provider referral line, [facility] [place], at [phone].    If you feel you have received this communication in error or would no longer like to receive these types of communications, please e-mail [email or web address]

## 2020-10-02 ENCOUNTER — PATIENT MESSAGE (OUTPATIENT)
Dept: ORTHOPEDICS | Facility: CLINIC | Age: 55
End: 2020-10-02

## 2020-10-05 DIAGNOSIS — G56.22 ULNAR NEUROPATHY OF LEFT UPPER EXTREMITY: ICD-10-CM

## 2020-10-05 DIAGNOSIS — G56.03 BILATERAL CARPAL TUNNEL SYNDROME: Primary | ICD-10-CM

## 2020-10-07 ENCOUNTER — CLINICAL SUPPORT (OUTPATIENT)
Dept: REHABILITATION | Facility: HOSPITAL | Age: 55
End: 2020-10-07
Payer: COMMERCIAL

## 2020-10-07 DIAGNOSIS — M25.512 ACUTE PAIN OF BOTH SHOULDERS: ICD-10-CM

## 2020-10-07 DIAGNOSIS — M79.18 MUSCLE PAIN, LUMBAR: ICD-10-CM

## 2020-10-07 DIAGNOSIS — M25.511 ACUTE PAIN OF BOTH SHOULDERS: ICD-10-CM

## 2020-10-07 DIAGNOSIS — M54.50 ACUTE BILATERAL LOW BACK PAIN WITHOUT SCIATICA: ICD-10-CM

## 2020-10-07 DIAGNOSIS — M54.2 NECK PAIN: ICD-10-CM

## 2020-10-07 DIAGNOSIS — M54.2 MUSCLE PAIN, CERVICAL: ICD-10-CM

## 2020-10-07 PROCEDURE — 97140 MANUAL THERAPY 1/> REGIONS: CPT | Mod: PN

## 2020-10-07 PROCEDURE — 97110 THERAPEUTIC EXERCISES: CPT | Mod: PN

## 2020-10-09 NOTE — PROGRESS NOTES
"  Physical Therapy Treatment Note     Name: Jeannie Saavedra  Clinic Number: [id number]    Therapy Diagnosis:   Encounter Diagnoses   Name Primary?    Neck pain     Acute pain of both shoulders     Muscle pain, cervical     Muscle pain, lumbar     Acute bilateral low back pain without sciatica      Physician: Jannette Salguero MD    Visit Date: 10/7/2020    Physician Orders: PT Eval and Treat   Medical Diagnosis: M54.2 (ICD-10-CM) - Neck pain  Evaluation Date: 9/23/2020      Authorization Period: 03/03/2021  Plan of Care Certification Period: 9/23/2020 to 11/13/2020  Visit # / Visits authorized: 1/24 (2 total visits)     Time In: 1830  Time Out: 1905  Total Billable Time: 30 minutes (1 TE, 1 MT)  Precautions: Standard    Subjective     Pt reports: that she just got off work and had a bad day; her normal work bus broke down.   She was compliant with home exercise program.  Response to previous treatment: eval and HEP  Functional change: no change.     Pain: 8/10  Location:  Neck radiating into the top of her shoulders, low back throughout.     Objective     Jeannie received therapeutic exercises to develop strength, endurance, ROM, flexibility, posture and core stabilization for 15 minutes including:  - cervical retractions    2x10  - LS stretch     3x15"  - UT stretch     3x15"  - supine towel roll thoracic stretch  3'   Reverse flys    20x  - LTR      20x  - seated lumbar flexion ball rolls  20x    Jeannie received the following manual therapy techniques: total time 15 minutes  - sub-occipital release  - STM/MFR B UT/LS  - CT junction stretch  - HS stretching      Home Exercises Provided and Patient Education Provided   Education provided:   - continue HEP.     Written Home Exercises Provided: Patient instructed to cont prior HEP.  Exercises were reviewed and Jeannie was able to demonstrate them prior to the end of the session.  Jeannie demonstrated fair  understanding of the education provided.     See EMR under " Media for exercises provided initial evaluation.    Assessment   A:  Low mood today.  Mrs. Dennis is overall frustrated with her job including her work load.  Suspect overuse of her B shoulder girdle and neck musculature which is resulting in a component of her pain but also with non-MSK source.  States that on her off-days she has no pain overall; only pain on days she works.  At this time, slowly introducing therex.      Jeannie is progressing well towards her goals.   Pt prognosis is Good/Fair     Pt will continue to benefit from skilled outpatient physical therapy to address the deficits listed in the problem list box on initial evaluation, provide pt/family education and to maximize pt's level of independence in the home and community environment.     Pt's spiritual, cultural and educational needs considered and pt agreeable to plan of care and goals.     Anticipated barriers to physical therapy: Stress-related component, expectations for PT, attitudes toward PT    Goals:   Short Term Goals: 3-4 weeks:  1. Patient will demonstrate 10 degree improvement in cervical LSB and RR  for improved environment visual scanning.  Progressing towards; not met  2. Patient will report ability to manage neck pain at work with HEP.  Progressing towards; not met   3. Patient will report 50% decrease in daily cervical pain for improved tolerance to ADL performance. Progressing towards; not met     Long Term Goals: 8 weeks:  1. Patient will demonstrate no pain with palpation of cervicoscapular musculature. Progressing towards; not met  2. Patient will report < 4/10 cervicoscapular pain at worst following workday. Progressing towards; not met    Plan   Continue plan of care.  Monitor response to interventions.  Adjust intensity accordingly.     Certification Period: 9/23/2020 to 11/13/2020.     Outpatient Physical Therapy 2 times weekly for 8 weeks to include the following interventions: Cervical/Lumbar Traction, Electrical  Stimulation IFC/NMES, Manual Therapy, Moist Heat/ Ice, Neuromuscular Re-ed, Patient Education, Self Care, Therapeutic Activites and Therapeutic Exercise, IASTM, Dry Stephan Maxwell, PT

## 2020-10-12 ENCOUNTER — TELEPHONE (OUTPATIENT)
Dept: ORTHOPEDICS | Facility: CLINIC | Age: 55
End: 2020-10-12

## 2020-10-12 NOTE — TELEPHONE ENCOUNTER
Left patient a voicemail stating we will not know the time she needs to be at the surgery center until the day before surgery as her paperwork states we will call her the day before between 3-6p.m. with her arrival time.----- Message from Cici Berenice sent at 10/12/2020 12:44 PM CDT -----  Type: Patient Call Back    Who called: pt     What is the request in detail: pt asking for time she needs to be at the hospital for procedure on 11/06.    Can the clinic reply by [facility]? No     Would the patient rather a call back or a response via [facility]? Call back     Best call back number: [phone] (home)     Additional Information:

## 2020-10-15 ENCOUNTER — TELEPHONE (OUTPATIENT)
Dept: ORTHOPEDICS | Facility: CLINIC | Age: 55
End: 2020-10-15

## 2020-10-16 ENCOUNTER — PATIENT MESSAGE (OUTPATIENT)
Dept: ORTHOPEDICS | Facility: CLINIC | Age: 55
End: 2020-10-16

## 2020-10-19 ENCOUNTER — CLINICAL SUPPORT (OUTPATIENT)
Dept: REHABILITATION | Facility: HOSPITAL | Age: 55
End: 2020-10-19
Payer: COMMERCIAL

## 2020-10-19 ENCOUNTER — ANESTHESIA EVENT (OUTPATIENT)
Dept: SURGERY | Facility: OTHER | Age: 55
End: 2020-10-19
Payer: COMMERCIAL

## 2020-10-19 ENCOUNTER — TELEPHONE (OUTPATIENT)
Dept: ORTHOPEDICS | Facility: CLINIC | Age: 55
End: 2020-10-19

## 2020-10-19 DIAGNOSIS — M54.2 NECK PAIN: ICD-10-CM

## 2020-10-19 DIAGNOSIS — Z01.818 PRE-OP TESTING: Primary | ICD-10-CM

## 2020-10-19 DIAGNOSIS — M79.18 MUSCLE PAIN, LUMBAR: ICD-10-CM

## 2020-10-19 DIAGNOSIS — M25.512 ACUTE PAIN OF BOTH SHOULDERS: ICD-10-CM

## 2020-10-19 DIAGNOSIS — M54.2 MUSCLE PAIN, CERVICAL: ICD-10-CM

## 2020-10-19 DIAGNOSIS — M25.511 ACUTE PAIN OF BOTH SHOULDERS: ICD-10-CM

## 2020-10-19 DIAGNOSIS — M54.50 ACUTE BILATERAL LOW BACK PAIN WITHOUT SCIATICA: ICD-10-CM

## 2020-10-19 PROCEDURE — 97110 THERAPEUTIC EXERCISES: CPT | Mod: PN

## 2020-10-19 PROCEDURE — 97140 MANUAL THERAPY 1/> REGIONS: CPT | Mod: PN

## 2020-10-19 NOTE — TELEPHONE ENCOUNTER
Attempted to contact pt. Left voicemail. Asked pt to return call to clinic at [phone] to r/s 11/06/20 due to change in Dr. Ramos's schedule. [employer] message also sent.

## 2020-10-19 NOTE — ANESTHESIA PREPROCEDURE EVALUATION
10/19/2020  Jeannie Saavedra is a 54 y.o., female   Chart review complete. Patient's medical history reviewed.  OK to proceed at [facility].   10/19/2020  .    Anesthesia Evaluation    I have reviewed the Patient Summary Reports.    I have reviewed the Nursing Notes. I have reviewed the NPO Status.   I have reviewed the Medications.     Review of Systems  Anesthesia Hx:  No problems with previous Anesthesia    Social:  Social Alcohol Use, Non-Smoker    Hematology/Oncology:  Hematology Normal   Oncology Normal     EENT/Dental:   chronic allergic rhinitis   Pulmonary:   Asthma asymptomatic    Renal/:   Chronic Renal Disease renal calculi    Hepatic/GI:  Hepatic/GI Normal    Musculoskeletal:   Arthritis  Sciatica. Spine Disorders: lumbar and cervical Chronic Pain    Endocrine:  Endocrine Normal    Dermatological:  Skin Normal    Psych:   Psychiatric History anxiety depression          Physical Exam  General:  Obesity    Airway/Jaw/Neck:  Airway Findings: Mouth Opening: Normal Tongue: Normal  General Airway Assessment: Adult, Average  Mallampati: II  TM Distance: Normal, at least 6 cm  Jaw/Neck Findings:  Neck ROM: Normal ROM      Dental:  Dental Findings: In tact, Molar caps         Mental Status:  Mental Status Findings:  Cooperative, Alert and Oriented         Anesthesia Plan  Type of Anesthesia, risks & benefits discussed:  Anesthesia Type:  general  Patient's Preference:   Intra-op Monitoring Plan: standard ASA monitors  Intra-op Monitoring Plan Comments:   Post Op Pain Control Plan: peripheral nerve block and per primary service following discharge from PACU  Post Op Pain Control Plan Comments:   Induction:    Beta Blocker:         Informed Consent: Patient understands risks and agrees with Anesthesia plan.  Questions answered. Anesthesia consent signed with patient.  ASA Score: 2     Day of Surgery Review of  History & Physical:    H&P update referred to the surgeon.     Anesthesia Plan Notes: Block discussed, probably postop. No labs.        Ready For Surgery From Anesthesia Perspective.

## 2020-10-19 NOTE — TELEPHONE ENCOUNTER
Spoke c pt. R/s 11/06/20 surgery to 11/04/20. Advised pt to contact [facility] office [phone] later this week. COVID test scheduled 11/01/20 at [facility]. PO r/s. Confirmed appt location & time. Pt expressed understanding & was thankful.

## 2020-10-19 NOTE — TELEPHONE ENCOUNTER
----- Message from Sesar Espana sent at 10/19/2020  9:32 AM CDT -----  Regarding: call back  Type: Patient Call Back    Who called:Jeannie     What is the request in detail: The patient is returning a call to Ms. Afshan Herrmann.    Can the clinic reply by [facility]?no    Would the patient rather a call back or a response via [facility]? Call back     Best call back number:[phone]

## 2020-10-20 ENCOUNTER — PATIENT MESSAGE (OUTPATIENT)
Dept: ORTHOPEDICS | Facility: CLINIC | Age: 55
End: 2020-10-20

## 2020-10-20 ENCOUNTER — PATIENT MESSAGE (OUTPATIENT)
Dept: SURGERY | Facility: OTHER | Age: 55
End: 2020-10-20

## 2020-10-21 ENCOUNTER — CLINICAL SUPPORT (OUTPATIENT)
Dept: REHABILITATION | Facility: HOSPITAL | Age: 55
End: 2020-10-21
Payer: COMMERCIAL

## 2020-10-21 DIAGNOSIS — M25.511 ACUTE PAIN OF BOTH SHOULDERS: ICD-10-CM

## 2020-10-21 DIAGNOSIS — M54.2 NECK PAIN: ICD-10-CM

## 2020-10-21 DIAGNOSIS — M54.2 MUSCLE PAIN, CERVICAL: ICD-10-CM

## 2020-10-21 DIAGNOSIS — M54.50 ACUTE BILATERAL LOW BACK PAIN WITHOUT SCIATICA: ICD-10-CM

## 2020-10-21 DIAGNOSIS — M25.512 ACUTE PAIN OF BOTH SHOULDERS: ICD-10-CM

## 2020-10-21 DIAGNOSIS — M79.18 MUSCLE PAIN, LUMBAR: ICD-10-CM

## 2020-10-21 PROCEDURE — 97110 THERAPEUTIC EXERCISES: CPT | Mod: PN

## 2020-10-21 PROCEDURE — 97140 MANUAL THERAPY 1/> REGIONS: CPT | Mod: PN

## 2020-10-21 NOTE — PROGRESS NOTES
"  Physical Therapy Treatment Note     Name: Jeannie Saavedra  Clinic Number: [id number]    Therapy Diagnosis:   Encounter Diagnoses   Name Primary?    Neck pain     Acute pain of both shoulders     Muscle pain, cervical     Muscle pain, lumbar     Acute bilateral low back pain without sciatica      Physician: Jannette Salguero MD    Visit Date: 10/19/2020    Physician Orders: PT Eval and Treat   Medical Diagnosis: M54.2 (ICD-10-CM) - Neck pain  Evaluation Date: 9/23/2020      Authorization Period: 03/03/2021  Plan of Care Certification Period: 9/23/2020 to 11/13/2020  Visit # / Visits authorized: 2/24 (3 total visits)     Time In: 1710  Time Out: 1750  Total Billable Time: 40 minutes (2 TE, 1 MT)  Precautions: Standard    Subjective     Pt reports: that she has been performing her neck exercises more than her lumbar exercises.   She was compliant with home exercise program.  Response to previous treatment:no new changes.   Functional change: no change.     Pain: 5/10  Location:  Neck radiating into the top of her shoulders, low back throughout.     Objective     Jeannie received therapeutic exercises to develop strength, endurance, ROM, flexibility, posture and core stabilization for 25 minutes including:  - cervical retractions    2x10  - LS stretch     3x15"  - UT stretch     3x15"  - supine towel roll thoracic stretch  3'   Reverse flys    20x  - LTR      20x  - lumbar PPT in 90/90    15x  - sciatic nerve glides 90/90   15x/each    Jeannie received the following manual therapy techniques: total time 15 minutes  - sub-occipital release  - STM/MFR B UT/LS  - CT junction stretch  - HS stretching      Home Exercises Provided and Patient Education Provided   Education provided:   - continue HEP.     Written Home Exercises Provided: Patient instructed to cont prior HEP.  Exercises were reviewed and Jeannie was able to demonstrate them prior to the end of the session.  Jeannie demonstrated fair  understanding of the " education provided.     See EMR under Media for exercises provided initial evaluation.    Assessment   A:  Lumbar pain more than cervical pain.  Muscular source cervical spine but lumbar spine seems more facetogenic.     Jeannie is progressing well towards her goals.   Pt prognosis is Good/Fair     Pt will continue to benefit from skilled outpatient physical therapy to address the deficits listed in the problem list box on initial evaluation, provide pt/family education and to maximize pt's level of independence in the home and community environment.     Pt's spiritual, cultural and educational needs considered and pt agreeable to plan of care and goals.     Anticipated barriers to physical therapy: Stress-related component, expectations for PT, attitudes toward PT    Goals:   Short Term Goals: 3-4 weeks:  1. Patient will demonstrate 10 degree improvement in cervical LSB and RR  for improved environment visual scanning.  Progressing towards; not met  2. Patient will report ability to manage neck pain at work with HEP.  Progressing towards; not met   3. Patient will report 50% decrease in daily cervical pain for improved tolerance to ADL performance. Progressing towards; not met     Long Term Goals: 8 weeks:  1. Patient will demonstrate no pain with palpation of cervicoscapular musculature. Progressing towards; not met  2. Patient will report < 4/10 cervicoscapular pain at worst following workday. Progressing towards; not met    Plan   Continue plan of care.  Monitor response to interventions.  Adjust intensity accordingly.     Certification Period: 9/23/2020 to 11/13/2020.     Outpatient Physical Therapy 2 times weekly for 8 weeks to include the following interventions: Cervical/Lumbar Traction, Electrical Stimulation IFC/NMES, Manual Therapy, Moist Heat/ Ice, Neuromuscular Re-ed, Patient Education, Self Care, Therapeutic Activites and Therapeutic Exercise, IASTM, Dry Needling    Sergio Maxwell, PT

## 2020-10-22 NOTE — PROGRESS NOTES
"  Physical Therapy Treatment Note     Name: Jeannie Saavedra  Clinic Number: [id number]    Therapy Diagnosis:   Encounter Diagnoses   Name Primary?    Neck pain     Acute pain of both shoulders     Muscle pain, cervical     Muscle pain, lumbar     Acute bilateral low back pain without sciatica      Physician: Jannette Salguero MD    Visit Date: 10/21/2020    Physician Orders: PT Eval and Treat   Medical Diagnosis: M54.2 (ICD-10-CM) - Neck pain  Evaluation Date: 9/23/2020      Authorization Period: 03/03/2021  Plan of Care Certification Period: 9/23/2020 to 11/13/2020  Visit # / Visits authorized: 3/24 (4 total visits)     Time In: 1830  Time Out: 1710  Total Billable Time: 40 minutes (2 TE, 1 MT)  Precautions: Standard    Subjective     Pt reports: improving neck symptoms but not lumbar.  Continues to have low back pain.  She did mention that she is seeking disability for her low back.   She was compliant with home exercise program.  Response to previous treatment:no new changes.   Functional change: no change.     Pain: 5/10  Location:  Neck radiating into the top of her shoulders, low back throughout.     Objective     Jeannie received therapeutic exercises to develop strength, endurance, ROM, flexibility, posture and core stabilization for 25 minutes including:  - cervical retractions    2x10  - LS stretch     3x15"  - UT stretch     3x15"  - supine towel roll thoracic stretch  3'   Reverse flys    20x  - LTR      20x  - lumbar PPT in 90/90    15x  - sciatic nerve glides 90/90   15x/each    Jeannie received the following manual therapy techniques: total time 15 minutes  - sub-occipital release  - STM/MFR B UT/LS  - CT junction stretch  - HS stretching      Home Exercises Provided and Patient Education Provided   Education provided:   - continue HEP.     Written Home Exercises Provided: Patient instructed to cont prior HEP.  Exercises were reviewed and Jeannie was able to demonstrate them prior to the end of the " session.  Jeannie demonstrated fair  understanding of the education provided.     See EMR under Media for exercises provided initial evaluation.    Assessment   A:  Lumbar pain more than cervical pain.  Muscular source cervical spine but lumbar spine seems more facetogenic.  Potential secondary gain issues at play concerning lumbar pain.     Jeannie is progressing well towards her goals.   Pt prognosis is Good/Fair     Pt will continue to benefit from skilled outpatient physical therapy to address the deficits listed in the problem list box on initial evaluation, provide pt/family education and to maximize pt's level of independence in the home and community environment.     Pt's spiritual, cultural and educational needs considered and pt agreeable to plan of care and goals.     Anticipated barriers to physical therapy: Stress-related component, expectations for PT, attitudes toward PT    Goals:   Short Term Goals: 3-4 weeks:  1. Patient will demonstrate 10 degree improvement in cervical LSB and RR  for improved environment visual scanning.  Progressing towards; not met  2. Patient will report ability to manage neck pain at work with HEP.  Progressing towards; not met   3. Patient will report 50% decrease in daily cervical pain for improved tolerance to ADL performance. Progressing towards; not met     Long Term Goals: 8 weeks:  1. Patient will demonstrate no pain with palpation of cervicoscapular musculature. Progressing towards; not met  2. Patient will report < 4/10 cervicoscapular pain at worst following workday. Progressing towards; not met    Plan   Continue plan of care.  Monitor response to interventions.  Adjust intensity accordingly.     Certification Period: 9/23/2020 to 11/13/2020.     Outpatient Physical Therapy 2 times weekly for 8 weeks to include the following interventions: Cervical/Lumbar Traction, Electrical Stimulation IFC/NMES, Manual Therapy, Moist Heat/ Ice, Neuromuscular Re-ed, Patient  Education, Self Care, Therapeutic Activites and Therapeutic Exercise, IAS, Vivek Maxwell, PT

## 2020-10-23 ENCOUNTER — PATIENT MESSAGE (OUTPATIENT)
Dept: ORTHOPEDICS | Facility: CLINIC | Age: 55
End: 2020-10-23

## 2020-10-23 ENCOUNTER — TELEPHONE (OUTPATIENT)
Dept: ORTHOPEDICS | Facility: CLINIC | Age: 55
End: 2020-10-23

## 2020-10-23 NOTE — TELEPHONE ENCOUNTER
----- Message from Harmeet Siegel sent at 10/23/2020 12:20 PM CDT -----      Name of Who is Calling: ANGELA GRACE [[id number]]      What is the request in detail: Pt called said she feel like her hands are getting worse and are going numb with shocking sensation.Please contact to further discuss and advise.          Can the clinic reply by [facility]: N      What Number to Call Back if not in [facility]: [phone]

## 2020-10-23 NOTE — TELEPHONE ENCOUNTER
Spoke with pt informing her that her symptoms are normal with carpal tunnel.  Pt was wanting a letter to be out of work next week.  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

## 2020-10-26 ENCOUNTER — CLINICAL SUPPORT (OUTPATIENT)
Dept: REHABILITATION | Facility: HOSPITAL | Age: 55
End: 2020-10-26
Payer: COMMERCIAL

## 2020-10-26 DIAGNOSIS — M54.50 ACUTE BILATERAL LOW BACK PAIN WITHOUT SCIATICA: ICD-10-CM

## 2020-10-26 DIAGNOSIS — M54.2 NECK PAIN: ICD-10-CM

## 2020-10-26 DIAGNOSIS — M25.512 ACUTE PAIN OF BOTH SHOULDERS: ICD-10-CM

## 2020-10-26 DIAGNOSIS — M25.511 ACUTE PAIN OF BOTH SHOULDERS: ICD-10-CM

## 2020-10-26 DIAGNOSIS — M54.2 MUSCLE PAIN, CERVICAL: ICD-10-CM

## 2020-10-26 DIAGNOSIS — M79.18 MUSCLE PAIN, LUMBAR: ICD-10-CM

## 2020-10-26 PROCEDURE — 97140 MANUAL THERAPY 1/> REGIONS: CPT | Mod: PN

## 2020-10-26 PROCEDURE — 97110 THERAPEUTIC EXERCISES: CPT | Mod: PN

## 2020-10-28 NOTE — PROGRESS NOTES
"  Physical Therapy Treatment Note     Name: Jeannie Saavedra  Clinic Number: [id number]    Therapy Diagnosis:   Encounter Diagnoses   Name Primary?    Neck pain     Acute pain of both shoulders     Muscle pain, cervical     Muscle pain, lumbar     Acute bilateral low back pain without sciatica      Physician: Jannette Salguero MD    Visit Date: 10/26/2020    Physician Orders: PT Eval and Treat   Medical Diagnosis: M54.2 (ICD-10-CM) - Neck pain  Evaluation Date: 9/23/2020      Authorization Period: 03/03/2021  Plan of Care Certification Period: 9/23/2020 to 11/13/2020  Visit # / Visits authorized: 4/24 (5 total visits)  FOTO: next     Time In: 1745  Time Out: 1825  Total Billable Time: 40 minutes (2 TE, 1 MT)  Precautions: Standard    Subjective     Pt reports: improving neck symptoms but not lumbar.  Increased lumbar pain today despite being off work again today.   She was compliant with home exercise program - neck exercises.   Response to previous treatment:no new changes.   Functional change: no change.     Pain: 5/10  Location:  Neck radiating into the top of her shoulders, low back throughout.     Objective     Jeannie received therapeutic exercises to develop strength, endurance, ROM, flexibility, posture and core stabilization for 25 minutes including:  - cervical retractions    2x10  - LS stretch     3x15"  - UT stretch     3x15"  - supine towel roll thoracic stretch  3'   Reverse flys    20x  - LTR      20x  - lumbar PPT in 90/90    15x  - sciatic nerve glides 90/90   15x/each    Jeannie received the following manual therapy techniques: total time 15 minutes  - sub-occipital release  - STM/MFR B UT/LS  - STM with IASTM [place] to lumbar spine  - HS stretching      Home Exercises Provided and Patient Education Provided   Education provided:   - continue HEP.     Written Home Exercises Provided: Patient instructed to cont prior HEP.  Exercises were reviewed and Jeannie was able to demonstrate them prior to " the end of the session.  Jeannie demonstrated fair  understanding of the education provided.     See EMR under Media for exercises provided initial evaluation.    Assessment   A:  Lumbar pain more than cervical pain.  Muscular source cervical spine but lumbar spine seems more facetogenic in combination with non-MSK source. Hypesthesia to light touch to lumbar area.  Pain with central lumbar PA springing throughout.  Potential secondary gain issues at play concerning lumbar pain.  Minimal carryover with lumbar interventions with PT at this time.  Patient scheduled for CTR next week.      Jeannie is progressing well towards her goals.   Pt prognosis is Good/Fair     Pt will continue to benefit from skilled outpatient physical therapy to address the deficits listed in the problem list box on initial evaluation, provide pt/family education and to maximize pt's level of independence in the home and community environment.     Pt's spiritual, cultural and educational needs considered and pt agreeable to plan of care and goals.     Anticipated barriers to physical therapy: Stress-related component, expectations for PT, attitudes toward PT    Goals:   Short Term Goals: 3-4 weeks:  1. Patient will demonstrate 10 degree improvement in cervical LSB and RR  for improved environment visual scanning.  Progressing towards; not met  2. Patient will report ability to manage neck pain at work with HEP.  MET 10/26/2020  3. Patient will report 50% decrease in daily cervical pain for improved tolerance to ADL performance. Progressing towards; not met     Long Term Goals: 8 weeks:  1. Patient will demonstrate no pain with palpation of cervicoscapular musculature. Progressing towards; not met  2. Patient will report < 4/10 cervicoscapular pain at worst following workday. Progressing towards; not met    Plan   DC planning    Certification Period: 9/23/2020 to 11/13/2020.     Outpatient Physical Therapy 2 times weekly for 8 weeks to include  the following interventions: Cervical/Lumbar Traction, Electrical Stimulation IFC/NMES, Manual Therapy, Moist Heat/ Ice, Neuromuscular Re-ed, Patient Education, Self Care, Therapeutic Activites and Therapeutic Exercise, IASTM, Dry Needrahat Maxwell, PT

## 2020-11-01 ENCOUNTER — LAB VISIT (OUTPATIENT)
Dept: URGENT CARE | Facility: CLINIC | Age: 55
End: 2020-11-01
Payer: COMMERCIAL

## 2020-11-01 DIAGNOSIS — Z01.818 PRE-OP TESTING: ICD-10-CM

## 2020-11-01 PROCEDURE — U0003 INFECTIOUS AGENT DETECTION BY NUCLEIC ACID (DNA OR RNA); SEVERE ACUTE RESPIRATORY SYNDROME CORONAVIRUS 2 (SARS-COV-2) (CORONAVIRUS DISEASE [COVID-19]), AMPLIFIED PROBE TECHNIQUE, MAKING USE OF HIGH THROUGHPUT TECHNOLOGIES AS DESCRIBED BY CMS-2020-01-R: HCPCS

## 2020-11-01 NOTE — PROGRESS NOTES
Pt here for preop covid swab. Orders in. Unable to print lab requisition due to printer in clinic being down. Attached pt MRN and Order# to pt specimen.

## 2020-11-02 LAB — SARS-COV-2 RNA RESP QL NAA+PROBE: NOT DETECTED

## 2020-11-03 ENCOUNTER — HOSPITAL ENCOUNTER (OUTPATIENT)
Dept: PREADMISSION TESTING | Facility: OTHER | Age: 55
Discharge: HOME | End: 2020-11-03
Attending: ORTHOPAEDIC SURGERY
Payer: COMMERCIAL

## 2020-11-03 ENCOUNTER — TELEPHONE (OUTPATIENT)
Dept: ORTHOPEDICS | Facility: CLINIC | Age: 55
End: 2020-11-03

## 2020-11-03 VITALS
TEMPERATURE: 97 F | OXYGEN SATURATION: 99 % | BODY MASS INDEX: 34.16 KG/M2 | HEIGHT: 60 IN | SYSTOLIC BLOOD PRESSURE: 126 MMHG | WEIGHT: 174 LBS | HEART RATE: 83 BPM | DIASTOLIC BLOOD PRESSURE: 81 MMHG

## 2020-11-03 DIAGNOSIS — Z98.890 POST-OPERATIVE STATE: Primary | ICD-10-CM

## 2020-11-03 RX ORDER — LIDOCAINE HYDROCHLORIDE 10 MG/ML
0.5 INJECTION, SOLUTION EPIDURAL; INFILTRATION; INTRACAUDAL; PERINEURAL ONCE
Status: CANCELLED | OUTPATIENT
Start: 2020-11-03 | End: 2020-11-03

## 2020-11-03 RX ORDER — ACETAMINOPHEN 500 MG
1000 TABLET ORAL
Status: CANCELLED | OUTPATIENT
Start: 2020-11-03 | End: 2020-11-03

## 2020-11-03 RX ORDER — CELECOXIB 200 MG/1
400 CAPSULE ORAL
Status: CANCELLED | OUTPATIENT
Start: 2020-11-03 | End: 2020-11-03

## 2020-11-03 RX ORDER — SODIUM CHLORIDE, SODIUM LACTATE, POTASSIUM CHLORIDE, CALCIUM CHLORIDE 600; 310; 30; 20 MG/100ML; MG/100ML; MG/100ML; MG/100ML
INJECTION, SOLUTION INTRAVENOUS CONTINUOUS
Status: CANCELLED | OUTPATIENT
Start: 2020-11-03

## 2020-11-03 RX ORDER — HYDROCODONE BITARTRATE AND ACETAMINOPHEN 5; 325 MG/1; MG/1
1 TABLET ORAL EVERY 6 HOURS PRN
Qty: 20 TABLET | Refills: 0 | Status: SHIPPED | OUTPATIENT
Start: 2020-11-03 | End: 2020-11-10 | Stop reason: SDUPTHER

## 2020-11-03 RX ORDER — MUPIROCIN 20 MG/G
OINTMENT TOPICAL
Status: CANCELLED | OUTPATIENT
Start: 2020-11-03

## 2020-11-03 RX ORDER — FAMOTIDINE 20 MG/1
20 TABLET, FILM COATED ORAL
Status: CANCELLED | OUTPATIENT
Start: 2020-11-03 | End: 2020-11-03

## 2020-11-03 NOTE — DISCHARGE INSTRUCTIONS
Information to Prepare you for your Surgery    PRE-ADMIT TESTING -  [phone]    [address]  [facility]          Your surgery has been scheduled at [facility]. We are pleased to have the opportunity to serve you. For Further Information please call [phone].    On the day of surgery please report to the Information Desk on the 1st floor.    · CONTACT YOUR PHYSICIAN'S OFFICE THE DAY PRIOR TO YOUR SURGERY TO OBTAIN YOUR ARRIVAL TIME.     · The evening before surgery do not eat anything after 9 p.m. ( this includes hard candy, chewing gum and mints).  You may only have GATORADE, POWERADE AND WATER  from 9 p.m. until you leave your home.   DO NOT DRINK ANY LIQUIDS ON THE WAY TO THE HOSPITAL.      SPECIAL MEDICATION INSTRUCTIONS: TAKE medications checked off by the Anesthesiologist on your Medication List.    Angiogram Patients: Take medications as instructed by your physician, including aspirin.     Surgery Patients:    If you take ASPIRIN - Your PHYSICIAN/SURGEON will need to inform you IF/OR when you need to stop taking aspirin prior to your surgery.     Do Not take any medications containing IBUPROFEN.  Do Not Wear any make-up or dark nail polish   (especially eye make-up) to surgery. If you come to surgery with makeup on you will be required to remove the makeup or nail polish.    Do not shave your surgical area at least 5 days prior to your surgery. The surgical prep will be performed at the hospital according to Infection Control regulations.    Leave all valuables at home.   Do Not wear any jewelry or watches, including any metal in body piercings. Jewelry must be removed prior to coming to the hospital.  There is a possibility that rings that are unable to be removed may be cut off if they are on the surgical extremity.    Contact Lens must be removed before surgery. Either do not wear the contact lens or bring a case and solution for  storage.  Please bring a container for eyeglasses or dentures as required.  Bring any paperwork your physician has provided, such as consent forms,  history and physicals, doctor's orders, etc.   Bring comfortable clothes that are loose fitting to wear upon discharge. Take into consideration the type of surgery being performed.  Maintain your diet as advised per your physician the day prior to surgery.      Adequate rest the night before surgery is advised.   Park in the Parking lot behind the hospital or in the [facility] across the street from the parking lot. Parking is complimentary.  If you will be discharged the same day as your procedure, please arrange for a responsible adult to drive you home or to accompany you if traveling by taxi.   YOU WILL NOT BE PERMITTED TO DRIVE OR TO LEAVE THE HOSPITAL ALONE AFTER SURGERY.   If you are being discharged the same day, it is strongly recommended that you arrange for someone to remain with you for the first 24 hrs following your surgery.    The Surgeon will speak to your family/visitor after your surgery regarding the outcome of your surgery and post op care.  The Surgeon may speak to you after your surgery, but there is a possibility you may not remember the details.  Please check with your family members regarding the conversation with the Surgeon.    We strongly recommend whoever is bringing you home be present for discharge instructions.  This will ensure a thorough understanding for your post op home care.    ALL CHILDREN MUST ALWAYS BE ACCOMPANIED BY AN ADULT.    Visitors-Refer to current Visitor policy handouts.    Thank you for your cooperation.  The Staff of [facility].                Bathing Instructions with Hibiclens     Shower the evening before and morning of your procedure with Hibiclens:   Wash your face with water and your regular face wash/soap   Apply Hibiclens directly on your skin or on a wet washcloth and wash  gently. When showering: Move away from the shower stream when applying Hibiclens to avoid rinsing off too soon.   Rinse thoroughly with warm water   Do not dilute Hibiclens         Dry off as usual, do not use any deodorant, powder, body lotions, perfume, after shave or cologne.

## 2020-11-03 NOTE — TELEPHONE ENCOUNTER
Informed patient to be at the surgery center 11/4/2020 for  6:45  a.m. Also reminded patient of post op appointment on 11/19/2020.Patient verbalized understanding.

## 2020-11-04 ENCOUNTER — HOSPITAL ENCOUNTER (OUTPATIENT)
Facility: OTHER | Age: 55
Discharge: HOME | End: 2020-11-04
Attending: ORTHOPAEDIC SURGERY | Admitting: ORTHOPAEDIC SURGERY
Payer: COMMERCIAL

## 2020-11-04 VITALS
TEMPERATURE: 97 F | HEART RATE: 72 BPM | BODY MASS INDEX: 34.16 KG/M2 | WEIGHT: 174 LBS | RESPIRATION RATE: 18 BRPM | HEIGHT: 60 IN | OXYGEN SATURATION: 97 % | SYSTOLIC BLOOD PRESSURE: 132 MMHG | DIASTOLIC BLOOD PRESSURE: 62 MMHG

## 2020-11-04 DIAGNOSIS — G56.02 LEFT CARPAL TUNNEL SYNDROME: ICD-10-CM

## 2020-11-04 PROCEDURE — 64718 REVISE ULNAR NERVE AT ELBOW: CPT | Mod: LT,,, | Performed by: ORTHOPAEDIC SURGERY

## 2020-11-04 PROCEDURE — 64718 PR REVISE ULNAR NERVE AT ELBOW: ICD-10-PCS | Mod: LT,,, | Performed by: ORTHOPAEDIC SURGERY

## 2020-11-04 PROCEDURE — 27201423 OPTIME MED/SURG SUP & DEVICES STERILE SUPPLY: Performed by: ORTHOPAEDIC SURGERY

## 2020-11-04 PROCEDURE — 37000009 HC ANESTHESIA EA ADD 15 MINS: Performed by: ORTHOPAEDIC SURGERY

## 2020-11-04 PROCEDURE — 63600175 PHARM REV CODE 636 W HCPCS: Performed by: SPECIALIST

## 2020-11-04 PROCEDURE — 64721 CARPAL TUNNEL SURGERY: CPT | Mod: 51,LT,, | Performed by: ORTHOPAEDIC SURGERY

## 2020-11-04 PROCEDURE — 37000008 HC ANESTHESIA 1ST 15 MINUTES: Performed by: ORTHOPAEDIC SURGERY

## 2020-11-04 PROCEDURE — 63600175 PHARM REV CODE 636 W HCPCS: Performed by: NURSE ANESTHETIST, CERTIFIED REGISTERED

## 2020-11-04 PROCEDURE — 25000003 PHARM REV CODE 250: Performed by: NURSE ANESTHETIST, CERTIFIED REGISTERED

## 2020-11-04 PROCEDURE — 71000015 HC POSTOP RECOV 1ST HR: Performed by: ORTHOPAEDIC SURGERY

## 2020-11-04 PROCEDURE — 76942 ECHO GUIDE FOR BIOPSY: CPT | Performed by: ANESTHESIOLOGY

## 2020-11-04 PROCEDURE — 36000707: Performed by: ORTHOPAEDIC SURGERY

## 2020-11-04 PROCEDURE — 71000033 HC RECOVERY, INTIAL HOUR: Performed by: ORTHOPAEDIC SURGERY

## 2020-11-04 PROCEDURE — 63600175 PHARM REV CODE 636 W HCPCS: Performed by: ORTHOPAEDIC SURGERY

## 2020-11-04 PROCEDURE — 25000003 PHARM REV CODE 250: Performed by: SPECIALIST

## 2020-11-04 PROCEDURE — 71000016 HC POSTOP RECOV ADDL HR: Performed by: ORTHOPAEDIC SURGERY

## 2020-11-04 PROCEDURE — 64721 PR REVISE MEDIAN N/CARPAL TUNNEL SURG: ICD-10-PCS | Mod: 51,LT,, | Performed by: ORTHOPAEDIC SURGERY

## 2020-11-04 PROCEDURE — 36000706: Performed by: ORTHOPAEDIC SURGERY

## 2020-11-04 PROCEDURE — 63600175 PHARM REV CODE 636 W HCPCS: Performed by: ANESTHESIOLOGY

## 2020-11-04 PROCEDURE — 20526 PR INJECT CARPAL TUNNEL: ICD-10-PCS | Mod: 59,51,RT, | Performed by: ORTHOPAEDIC SURGERY

## 2020-11-04 PROCEDURE — 64415 NJX AA&/STRD BRCH PLXS IMG: CPT | Performed by: ANESTHESIOLOGY

## 2020-11-04 PROCEDURE — 25000003 PHARM REV CODE 250: Performed by: ORTHOPAEDIC SURGERY

## 2020-11-04 PROCEDURE — 71000039 HC RECOVERY, EACH ADD'L HOUR: Performed by: ORTHOPAEDIC SURGERY

## 2020-11-04 PROCEDURE — 63600175 PHARM REV CODE 636 W HCPCS: Performed by: STUDENT IN AN ORGANIZED HEALTH CARE EDUCATION/TRAINING PROGRAM

## 2020-11-04 PROCEDURE — 20526 THER INJECTION CARP TUNNEL: CPT | Mod: 59,51,RT, | Performed by: ORTHOPAEDIC SURGERY

## 2020-11-04 DEVICE — IMPLANTABLE DEVICE: Type: IMPLANTABLE DEVICE | Site: HAND | Status: FUNCTIONAL

## 2020-11-04 RX ORDER — ROPIVACAINE HYDROCHLORIDE 5 MG/ML
INJECTION, SOLUTION EPIDURAL; INFILTRATION; PERINEURAL
Status: DISCONTINUED | OUTPATIENT
Start: 2020-11-04 | End: 2020-11-04

## 2020-11-04 RX ORDER — MUPIROCIN 20 MG/G
OINTMENT TOPICAL
Status: COMPLETED | OUTPATIENT
Start: 2020-11-04 | End: 2020-11-04

## 2020-11-04 RX ORDER — SODIUM CHLORIDE 0.9 % (FLUSH) 0.9 %
10 SYRINGE (ML) INJECTION
Status: DISCONTINUED | OUTPATIENT
Start: 2020-11-04 | End: 2020-11-04 | Stop reason: HOSPADM

## 2020-11-04 RX ORDER — OXYCODONE HYDROCHLORIDE 5 MG/1
5 TABLET ORAL
Status: DISCONTINUED | OUTPATIENT
Start: 2020-11-04 | End: 2020-11-04 | Stop reason: HOSPADM

## 2020-11-04 RX ORDER — FAMOTIDINE 20 MG/1
20 TABLET, FILM COATED ORAL
Status: COMPLETED | OUTPATIENT
Start: 2020-11-04 | End: 2020-11-04

## 2020-11-04 RX ORDER — LIDOCAINE HYDROCHLORIDE 10 MG/ML
INJECTION, SOLUTION EPIDURAL; INFILTRATION; INTRACAUDAL; PERINEURAL
Status: DISCONTINUED | OUTPATIENT
Start: 2020-11-04 | End: 2020-11-04 | Stop reason: HOSPADM

## 2020-11-04 RX ORDER — DEXAMETHASONE SODIUM PHOSPHATE 4 MG/ML
INJECTION, SOLUTION INTRA-ARTICULAR; INTRALESIONAL; INTRAMUSCULAR; INTRAVENOUS; SOFT TISSUE
Status: DISCONTINUED | OUTPATIENT
Start: 2020-11-04 | End: 2020-11-04

## 2020-11-04 RX ORDER — ONDANSETRON 2 MG/ML
4 INJECTION INTRAMUSCULAR; INTRAVENOUS EVERY 12 HOURS PRN
Status: DISCONTINUED | OUTPATIENT
Start: 2020-11-04 | End: 2020-11-04 | Stop reason: HOSPADM

## 2020-11-04 RX ORDER — HYDROCODONE BITARTRATE AND ACETAMINOPHEN 5; 325 MG/1; MG/1
1 TABLET ORAL EVERY 4 HOURS PRN
Status: DISCONTINUED | OUTPATIENT
Start: 2020-11-04 | End: 2020-11-04 | Stop reason: HOSPADM

## 2020-11-04 RX ORDER — CEFAZOLIN SODIUM 1 G/3ML
2 INJECTION, POWDER, FOR SOLUTION INTRAMUSCULAR; INTRAVENOUS
Status: COMPLETED | OUTPATIENT
Start: 2020-11-04 | End: 2020-11-04

## 2020-11-04 RX ORDER — FENTANYL CITRATE 50 UG/ML
100 INJECTION, SOLUTION INTRAMUSCULAR; INTRAVENOUS EVERY 5 MIN PRN
Status: DISCONTINUED | OUTPATIENT
Start: 2020-11-04 | End: 2020-11-04 | Stop reason: HOSPADM

## 2020-11-04 RX ORDER — SODIUM CHLORIDE 9 MG/ML
INJECTION, SOLUTION INTRAVENOUS CONTINUOUS
Status: DISCONTINUED | OUTPATIENT
Start: 2020-11-04 | End: 2020-11-04 | Stop reason: HOSPADM

## 2020-11-04 RX ORDER — HYDROCODONE BITARTRATE AND ACETAMINOPHEN 10; 325 MG/1; MG/1
1 TABLET ORAL EVERY 4 HOURS PRN
Status: DISCONTINUED | OUTPATIENT
Start: 2020-11-04 | End: 2020-11-04 | Stop reason: HOSPADM

## 2020-11-04 RX ORDER — ONDANSETRON 2 MG/ML
INJECTION INTRAMUSCULAR; INTRAVENOUS
Status: DISCONTINUED | OUTPATIENT
Start: 2020-11-04 | End: 2020-11-04

## 2020-11-04 RX ORDER — DIPHENHYDRAMINE HYDROCHLORIDE 50 MG/ML
25 INJECTION INTRAMUSCULAR; INTRAVENOUS EVERY 6 HOURS PRN
Status: DISCONTINUED | OUTPATIENT
Start: 2020-11-04 | End: 2020-11-04 | Stop reason: HOSPADM

## 2020-11-04 RX ORDER — PROPOFOL 10 MG/ML
VIAL (ML) INTRAVENOUS
Status: DISCONTINUED | OUTPATIENT
Start: 2020-11-04 | End: 2020-11-04

## 2020-11-04 RX ORDER — ACETAMINOPHEN 500 MG
1000 TABLET ORAL
Status: COMPLETED | OUTPATIENT
Start: 2020-11-04 | End: 2020-11-04

## 2020-11-04 RX ORDER — MEPERIDINE HYDROCHLORIDE 25 MG/ML
12.5 INJECTION INTRAMUSCULAR; INTRAVENOUS; SUBCUTANEOUS ONCE AS NEEDED
Status: DISCONTINUED | OUTPATIENT
Start: 2020-11-04 | End: 2020-11-04 | Stop reason: HOSPADM

## 2020-11-04 RX ORDER — LIDOCAINE HYDROCHLORIDE 10 MG/ML
0.5 INJECTION, SOLUTION EPIDURAL; INFILTRATION; INTRACAUDAL; PERINEURAL ONCE
Status: DISCONTINUED | OUTPATIENT
Start: 2020-11-04 | End: 2020-11-04 | Stop reason: HOSPADM

## 2020-11-04 RX ORDER — SODIUM CHLORIDE 0.9 % (FLUSH) 0.9 %
3 SYRINGE (ML) INJECTION
Status: DISCONTINUED | OUTPATIENT
Start: 2020-11-04 | End: 2020-11-04 | Stop reason: HOSPADM

## 2020-11-04 RX ORDER — FENTANYL CITRATE 50 UG/ML
INJECTION, SOLUTION INTRAMUSCULAR; INTRAVENOUS
Status: DISCONTINUED | OUTPATIENT
Start: 2020-11-04 | End: 2020-11-04

## 2020-11-04 RX ORDER — ACETAMINOPHEN 325 MG/1
650 TABLET ORAL EVERY 4 HOURS PRN
Status: DISCONTINUED | OUTPATIENT
Start: 2020-11-04 | End: 2020-11-04 | Stop reason: HOSPADM

## 2020-11-04 RX ORDER — HYDROMORPHONE HYDROCHLORIDE 2 MG/ML
0.4 INJECTION, SOLUTION INTRAMUSCULAR; INTRAVENOUS; SUBCUTANEOUS EVERY 5 MIN PRN
Status: DISCONTINUED | OUTPATIENT
Start: 2020-11-04 | End: 2020-11-04 | Stop reason: HOSPADM

## 2020-11-04 RX ORDER — PHENYLEPHRINE HYDROCHLORIDE 10 MG/ML
INJECTION INTRAVENOUS
Status: DISCONTINUED | OUTPATIENT
Start: 2020-11-04 | End: 2020-11-04

## 2020-11-04 RX ORDER — CELECOXIB 200 MG/1
400 CAPSULE ORAL
Status: COMPLETED | OUTPATIENT
Start: 2020-11-04 | End: 2020-11-04

## 2020-11-04 RX ORDER — ONDANSETRON 2 MG/ML
4 INJECTION INTRAMUSCULAR; INTRAVENOUS DAILY PRN
Status: DISCONTINUED | OUTPATIENT
Start: 2020-11-04 | End: 2020-11-04 | Stop reason: HOSPADM

## 2020-11-04 RX ORDER — SODIUM CHLORIDE, SODIUM LACTATE, POTASSIUM CHLORIDE, CALCIUM CHLORIDE 600; 310; 30; 20 MG/100ML; MG/100ML; MG/100ML; MG/100ML
INJECTION, SOLUTION INTRAVENOUS CONTINUOUS
Status: DISCONTINUED | OUTPATIENT
Start: 2020-11-04 | End: 2020-11-04 | Stop reason: HOSPADM

## 2020-11-04 RX ORDER — BACITRACIN ZINC 500 UNIT/G
OINTMENT (GRAM) TOPICAL
Status: DISCONTINUED | OUTPATIENT
Start: 2020-11-04 | End: 2020-11-04 | Stop reason: HOSPADM

## 2020-11-04 RX ORDER — DEXAMETHASONE SODIUM PHOSPHATE 4 MG/ML
INJECTION, SOLUTION INTRA-ARTICULAR; INTRALESIONAL; INTRAMUSCULAR; INTRAVENOUS; SOFT TISSUE
Status: DISCONTINUED | OUTPATIENT
Start: 2020-11-04 | End: 2020-11-04 | Stop reason: HOSPADM

## 2020-11-04 RX ORDER — MIDAZOLAM HYDROCHLORIDE 1 MG/ML
2 INJECTION INTRAMUSCULAR; INTRAVENOUS
Status: DISCONTINUED | OUTPATIENT
Start: 2020-11-04 | End: 2020-11-04 | Stop reason: HOSPADM

## 2020-11-04 RX ORDER — LIDOCAINE HYDROCHLORIDE 20 MG/ML
INJECTION INTRAVENOUS
Status: DISCONTINUED | OUTPATIENT
Start: 2020-11-04 | End: 2020-11-04

## 2020-11-04 RX ORDER — MUPIROCIN 20 MG/G
OINTMENT TOPICAL 2 TIMES DAILY
Status: DISCONTINUED | OUTPATIENT
Start: 2020-11-04 | End: 2020-11-04 | Stop reason: HOSPADM

## 2020-11-04 RX ADMIN — ROPIVACAINE HYDROCHLORIDE 20 ML: 5 INJECTION, SOLUTION EPIDURAL; INFILTRATION; PERINEURAL at 10:11

## 2020-11-04 RX ADMIN — FENTANYL CITRATE 100 MCG: 50 INJECTION, SOLUTION INTRAMUSCULAR; INTRAVENOUS at 08:11

## 2020-11-04 RX ADMIN — OXYCODONE 5 MG: 5 TABLET ORAL at 10:11

## 2020-11-04 RX ADMIN — MUPIROCIN: 20 OINTMENT TOPICAL at 07:11

## 2020-11-04 RX ADMIN — LIDOCAINE HYDROCHLORIDE 50 MG: 20 INJECTION, SOLUTION INTRAVENOUS at 08:11

## 2020-11-04 RX ADMIN — HYDROMORPHONE HYDROCHLORIDE 0.4 MG: 2 INJECTION, SOLUTION INTRAMUSCULAR; INTRAVENOUS; SUBCUTANEOUS at 10:11

## 2020-11-04 RX ADMIN — ONDANSETRON HYDROCHLORIDE 4 MG: 2 INJECTION INTRAMUSCULAR; INTRAVENOUS at 09:11

## 2020-11-04 RX ADMIN — FAMOTIDINE 20 MG: 20 TABLET ORAL at 07:11

## 2020-11-04 RX ADMIN — CELECOXIB 400 MG: 200 CAPSULE ORAL at 07:11

## 2020-11-04 RX ADMIN — PHENYLEPHRINE HYDROCHLORIDE 100 MCG: 10 INJECTION INTRAVENOUS at 09:11

## 2020-11-04 RX ADMIN — ACETAMINOPHEN 1000 MG: 500 TABLET, FILM COATED ORAL at 07:11

## 2020-11-04 RX ADMIN — PROPOFOL 200 MG: 10 INJECTION, EMULSION INTRAVENOUS at 08:11

## 2020-11-04 RX ADMIN — CEFAZOLIN 2 G: 330 INJECTION, POWDER, FOR SOLUTION INTRAMUSCULAR; INTRAVENOUS at 08:11

## 2020-11-04 RX ADMIN — DEXAMETHASONE SODIUM PHOSPHATE 8 MG: 4 INJECTION, SOLUTION INTRAMUSCULAR; INTRAVENOUS at 09:11

## 2020-11-04 RX ADMIN — SODIUM CHLORIDE, SODIUM LACTATE, POTASSIUM CHLORIDE, AND CALCIUM CHLORIDE: 600; 310; 30; 20 INJECTION, SOLUTION INTRAVENOUS at 08:11

## 2020-11-04 NOTE — TRANSFER OF CARE
Anesthesia Transfer of Care Note    Patient: Jeannie Saavedra    Procedure(s) Performed: Procedure(s) (LRB):  RELEASE, CARPAL TUNNEL-Left (Left)  DECOMPRESSION, NERVE, ULNAR-left elbow (Left)  INJECTION, STEROID-right carpal tunnel (Right)    Patient location: PACU    Anesthesia Type: general    Transport from OR: Transported from OR on 2-3 L/min O2 by NC with adequate spontaneous ventilation    Post pain: adequate analgesia    Post assessment: no apparent anesthetic complications    Post vital signs: stable    Level of consciousness: awake    Nausea/Vomiting: no nausea/vomiting    Complications: none    Transfer of care protocol was followed      Last vitals:   Visit Vitals  BP (!) 142/83 (BP Location: Right arm, Patient Position: Lying)   Pulse 71   Temp 36.3 °C (97.4 °F) (Temporal)   Resp 18   Ht 5' (1.524 m)   Wt 78.9 kg (174 lb)   SpO2 100%   Breastfeeding No   BMI 33.98 kg/m²

## 2020-11-04 NOTE — ANESTHESIA PROCEDURE NOTES
Intubation  Performed by: Yadira Miller CRNA  Authorized by: Lee Nunez MD     Intubation:     Induction:  Intravenous    Intubated:  Postinduction    Mask Ventilation:  N/a    Attempts:  1    Attempted By:  CRNA    Difficult Airway Encountered?: No      Complications:  None    Airway Device:  Supraglottic airway/LMA    Airway Device Size:  4.0    Style/Cuff Inflation:  Cuffed (inflated to minimal occlusive pressure) (to seal)    Secured at:  The lips    Placement Verified By:  Capnometry    Complicating Factors:  None    Findings Post-Intubation:  Atraumatic/condition of teeth unchanged and BS equal bilateral

## 2020-11-04 NOTE — OR NURSING
Patient complaining about splint sticking skin under her arm. Dr Rosen at bedside fixing dressing. Patient still complaining of discomfort under her arm, stating she will have to removed the dressing herself. Patient requested to speak with Dr Muniz. Called OR and left message for Dr Muniz with [facility].     [address] at bed side assessing patient.

## 2020-11-04 NOTE — BRIEF OP NOTE
[facility]  Brief Operative Note    Surgery Date: 11/4/2020     Surgeon(s) and Role:     * Fay Muniz MD - Primary    Assisting Surgeon: None    Pre-op Diagnosis:  Bilateral carpal tunnel syndrome [G56.03]  Ulnar neuropathy of left upper extremity [G56.22]    Post-op Diagnosis:  Post-Op Diagnosis Codes:     * Bilateral carpal tunnel syndrome [G56.03]     * Ulnar neuropathy of left upper extremity [G56.22]    Procedure(s) (LRB):  RELEASE, CARPAL TUNNEL-Left (Left)  DECOMPRESSION, NERVE, ULNAR-left elbow (Left)  INJECTION, STEROID-right carpal tunnel (Right)    Anesthesia: General    Description of the findings of the procedure(s): see op note             Specimens:   Specimen (12h ago, onward)    None            Discharge Note    OUTCOME: Patient tolerated treatment/procedure well without complication and is now ready for discharge.    DISPOSITION: Home or Self Care    FINAL DIAGNOSIS:  Left carpal tunnel syndrome    FOLLOWUP: In clinic    DISCHARGE INSTRUCTIONS:    Discharge Procedure Orders   Diet general     Call MD for:  temperature >100.4     Call MD for:  persistent nausea and vomiting     Call MD for:  severe uncontrolled pain     Call MD for:  difficulty breathing, headache or visual disturbances     Call MD for:  redness, tenderness, or signs of infection (pain, swelling, redness, odor or green/yellow discharge around incision site)     Call MD for:  hives     Call MD for:  persistent dizziness or light-headedness     Call MD for:  extreme fatigue     Sponge bath only until clinic visit     Keep surgical extremity elevated     Lifting restrictions   Order Comments: No lifting with operative extremity     No driving, operating heavy equipment or signing legal documents while taking pain medication.     Leave dressing on - Keep it clean, dry, and intact until clinic visit

## 2020-11-04 NOTE — ANESTHESIA POSTPROCEDURE EVALUATION
Anesthesia Post Evaluation    Patient: Jeannie Saavedra    Procedure(s) Performed: Procedure(s) (LRB):  RELEASE, CARPAL TUNNEL-Left (Left)  DECOMPRESSION, NERVE, ULNAR-left elbow (Left)  INJECTION, STEROID-right carpal tunnel (Right)    Final Anesthesia Type: general    Patient location during evaluation: PACU  Patient participation: Yes- Able to Participate  Level of consciousness: awake and alert and oriented  Post-procedure vital signs: reviewed and stable  Pain management: adequate  Airway patency: patent    PONV status at discharge: No PONV  Anesthetic complications: no      Cardiovascular status: blood pressure returned to baseline and hemodynamically stable  Respiratory status: unassisted, spontaneous ventilation and room air  Hydration status: euvolemic  Follow-up not needed.          Vitals Value Taken Time   /75 11/04/20 1048   Temp 36.3 °C (97.4 °F) 11/04/20 1000   Pulse 73 11/04/20 1100   Resp 18 11/04/20 1025   SpO2 100 % 11/04/20 1100   Vitals shown include unvalidated device data.      Event Time   Out of Recovery 11:01:42         Pain/Randy Score: Pain Rating Prior to Med Admin: 10 (11/4/2020 10:25 AM)  Pain Rating Post Med Admin: 10 (11/4/2020 10:30 AM)  Randy Score: 10 (11/4/2020 10:30 AM)

## 2020-11-04 NOTE — DISCHARGE INSTRUCTIONS

## 2020-11-04 NOTE — ANESTHESIA PROCEDURE NOTES
Peripheral Block    Patient location during procedure: post-op   Block not for primary anesthetic.  Reason for block: at surgeon's request and post-op pain management   Post-op Pain Location: elbow pain   End time: 11/4/2020 10:40 AM    Staffing  Authorizing Provider: Lee Nunez MD  Performing Provider: Abimael Alegre MD    Preanesthetic Checklist  Completed: patient identified, site marked, surgical consent, pre-op evaluation, timeout performed, IV checked, risks and benefits discussed and monitors and equipment checked  Peripheral Block  Patient position: supine  Prep: ChloraPrep  Patient monitoring: heart rate, cardiac monitor, continuous pulse ox and frequent blood pressure checks  Block type: supraclavicular  Laterality: left  Injection technique: single shot  Needle  Needle type: Stimuplex   Needle gauge: 22 G  Needle length: 4 in  Needle localization: ultrasound guidance and nerve stimulator   -ultrasound image captured on disc.  Assessment  Injection assessment: negative aspiration, negative parasthesia and local visualized surrounding nerve  Paresthesia pain: none  Slow fractionated injection: yes

## 2020-11-04 NOTE — H&P
DATE: 11/3/2020  PATIENT: Jeannie Saavedra    CHIEF COMPLAINT: B CTS, L cubital tunnel syndrome    HPI:  Presents for surgery.    Past Medical History:   Diagnosis Date    Allergy     Asthma     Keloid cicatrix     Nephrolithiasis     Osteoarthritis of lumbar spine     Sciatica        Past Surgical History:   Procedure Laterality Date     SECTION      COLONOSCOPY N/A 2018    Procedure: COLONOSCOPY with SOFÍA in 4 weeks;  Surgeon: Cali Ramos MD;  Location: [facility] ([address] [place] [address]);  Service: Endoscopy;  Laterality: N/A;    HYSTERECTOMY      for hypermenorrhea       Family History   Problem Relation Age of Onset    Heart disease Father     Hyperlipidemia Mother     Hypertension Mother     Alcohol abuse Sister     Depression Sister     No Known Problems Brother     No Known Problems Daughter     Asthma Son     Breast cancer Paternal Grandmother     Melanoma Neg Hx     Acne Neg Hx     Lupus Neg Hx     Psoriasis Neg Hx     Eczema Neg Hx        Social History     Socioeconomic History    Marital status: Single     Spouse name: Not on file    Number of children: Not on file    Years of education: Not on file    Highest education level: Not on file   Occupational History     Employer: [employer]     Comment: busdriver   Social Needs    Financial resource strain: Somewhat hard    Food insecurity     Worry: Sometimes true     Inability: Never true    Transportation needs     Medical: No     Non-medical: No   Tobacco Use    Smoking status: Never Smoker    Smokeless tobacco: Never Used   Substance and Sexual Activity    Alcohol use: Yes     Frequency: Never     Drinks per session: Patient refused     Binge frequency: Never     Comment: rarely - special occasions only    Drug use: No    Sexual activity: Not on file   Lifestyle    Physical activity     Days per week: 2 days     Minutes per session: 20 min    Stress: Very much   Relationships    Social  connections     Talks on phone: Once a week     Gets together: Never     Attends Holiness service: Not on file     Active member of club or organization: No     Attends meetings of clubs or organizations: Never     Relationship status: Patient refused   Other Topics Concern    Are you pregnant or think you may be? Not Asked    Breast-feeding Not Asked   Social History Narrative    Walks 4 days a week, for 20-30 minutes.        Single.       No current facility-administered medications for this encounter.     Current Outpatient Medications:     ammonium lactate (LAC-HYDRIN) 12 % lotion, Apply topically as needed for Dry Skin., Disp: 225 g, Rfl: 6    cyproheptadine (PERIACTIN) 4 mg tablet, Take 1 tablet (4 mg total) by mouth 2 (two) times daily as needed., Disp: 60 tablet, Rfl: 1    HYDROcodone-acetaminophen (NORCO) 5-325 mg per tablet, Take 1 tablet by mouth every 6 (six) hours as needed., Disp: 20 tablet, Rfl: 0    meloxicam (MOBIC) 15 MG tablet, Take 1 tablet (15 mg total) by mouth once daily., Disp: 30 tablet, Rfl: 5    Review of patient's allergies indicates:  No Known Allergies    REVIEW OF SYSTEMS:  Constitutional: negative for fevers  Musculoskeletal: positive for paresthesias    PHYSICAL EXAMINATION:    Ht 5' (1.524 m)   Wt 78.9 kg (174 lb)   BMI 33.98 kg/m²     General: No acute distress.  Cardio: Regular rate.  Chest: No increased work of breathing.         IMAGING:     none    ASSESSMENT/PLAN:    Plan for L CTR, L UND at elbow, R CT CSI.

## 2020-11-06 ENCOUNTER — ANESTHESIA (OUTPATIENT)
Dept: SURGERY | Facility: OTHER | Age: 55
End: 2020-11-06
Payer: COMMERCIAL

## 2020-11-09 ENCOUNTER — TELEPHONE (OUTPATIENT)
Dept: ORTHOPEDICS | Facility: CLINIC | Age: 55
End: 2020-11-09

## 2020-11-09 NOTE — TELEPHONE ENCOUNTER
----- Message from Cecilia Booth sent at 11/9/2020 10:26 AM CST -----  Regarding: pt  Type: Patient Call Back    Who called:pt    What is the request in detailpt wants to know if she can take her bandage off because it is too tight. Call pt    Can the clinic reply by [facility]?    Would the patient rather a call back or a response via [facility]? call    Best call back number:[phone] (home)       Additional Information:

## 2020-11-10 ENCOUNTER — TELEPHONE (OUTPATIENT)
Dept: ORTHOPEDICS | Facility: CLINIC | Age: 55
End: 2020-11-10

## 2020-11-10 DIAGNOSIS — Z98.890 POST-OPERATIVE STATE: ICD-10-CM

## 2020-11-10 RX ORDER — HYDROCODONE BITARTRATE AND ACETAMINOPHEN 5; 325 MG/1; MG/1
1 TABLET ORAL EVERY 6 HOURS PRN
Qty: 28 TABLET | Refills: 0 | Status: SHIPPED | OUTPATIENT
Start: 2020-11-10 | End: 2020-12-17

## 2020-11-10 NOTE — TELEPHONE ENCOUNTER
Pt would like refill on her Norco        Pt has pain in her Wrist, Elbow, and Shoulder     Before pain med level 8-9  AND after pain med level 6    Tylenol, Goodies, Ibuprofen are ineffective    Unable to apply ice to area due to splint    Pharmacy is [facility]

## 2020-11-10 NOTE — OP NOTE
[facility]  Surgery Department  Operative Note    SUMMARY     Date of Procedure: 11/4/2020     Procedure: Procedure(s) (LRB):  RELEASE, CARPAL TUNNEL-Left (Left)  DECOMPRESSION, NERVE, ULNAR-left elbow (Left)  INJECTION, STEROID-right carpal tunnel (Right)     Surgeon(s) and Role:     * Fay Muniz MD - Primary    Assisting Surgeon:    Pre-Operative Diagnosis: Bilateral carpal tunnel syndrome [G56.03]  Ulnar neuropathy of left upper extremity [G56.22]    Post-Operative Diagnosis: Post-Op Diagnosis Codes:     * Bilateral carpal tunnel syndrome [G56.03]     * Ulnar neuropathy of left upper extremity [G56.22]    Anesthesia: General    Technical Procedures Used:surgery    Description of the Findings of the Procedure:  Indication for procedure Ms. Saavedra this 54-year-old female who has failed conservative treatment for carpal tunnel and ulnar nerve after much discussion with the patient elected for surgical intervention risks and benefits were explained to patient in clinic consents were signed in clinic    Procedure in detail the correct site was marked with the patient's participation the holding area the patient was brought to the operating placed supine position general anesthesia left upper extremity was prepped draped normal sterile fashion sterile tourniquet well-padded position was placed on the arm time-out was conducted for the correct procedure to be indicated IV antibiotics given patient preoperatively the arm was exsanguinated with an Esmarch tourniquet was insufflated 250 mmHg incision was made just posterior to the medial epicondyle left elbow incision was made careful dissection down to the ulnar nerve was maintain all the while proceed protecting the medial antebrachial cutaneous nerve ulnar nerve was identified was completely decompressed elbow was placed range of motion showed it did not sublux areas irrigated copious amounts normal saline clear X patch for decreasing  scar formation and increasing micro circulation was placed Vicryl Monocryl Dermabond closed the skin our attention was turned carpal tunnel using Salas's cardinal lines incision was made careful dissection down to the palmar fascia was maintained palmar fascia was sharply incised transversely was identified it was sharply incised mosquito hemostat was passed on the undersurface of the transverse ligament proximally and distally to free it the median nerve it was continued to be incised and completely released getting hemostat was passed once again to confirm a complete release area was irrigated copious amounts normal saline nylon closed the skin sterile dressing was applied tourniquet was deflated brisk cap refill sued patient was placed in a long-arm posterior splint tolerated procedure well and injection of dex about the 4 mg lidocaine 1% without epinephrine was injected in the right carpal tunnel space patient was extubated the OR without complications brought to cover room stable condition    Postop plans patient keep the dressing clean dry and intact will see the patient back in 2 weeks time sutures are to be removed therapy to be initiated    Significant Surgical Tasks Conducted by the Assistant(s), if Applicable: retraction    Complications: No    Estimated Blood Loss (EBL): * No values recorded between 11/4/2020  9:13 AM and 11/4/2020 10:00 AM *           Implants:   Implant Name Type Inv. Item Serial No.  Lot No. LRB No. Used Action   [id number]  MEMBRANE NEOX 1K [id number] [id number] Left 6 Implanted       Specimens:   Specimen (12h ago, onward)    None                  Condition: Good    Disposition: PACU - hemodynamically stable.    Attestation: I performed the procedure.    Discharge Note    SUMMARY     Admit Date: 11/4/2020    Discharge Date and Time: 11/4/2020  3:39 PM    Hospital Course (synopsis of major diagnoses, care, treatment, and services  provided during the course of the hospital stay): surgery     Final Diagnosis: Post-Op Diagnosis Codes:     * Bilateral carpal tunnel syndrome [G56.03]     * Ulnar neuropathy of left upper extremity [G56.22]    Disposition: Home or Self Care    Follow Up/Patient Instructions:     Medications:  Reconciled Home Medications:      Medication List      CONTINUE taking these medications    ammonium lactate 12 % lotion  Commonly known as: LAC-HYDRIN  Apply topically as needed for Dry Skin.     CALCIUM-VITAMIN D3 ORAL  Take by mouth.     cyproheptadine 4 mg tablet  Commonly known as: PERIACTIN  Take 1 tablet (4 mg total) by mouth 2 (two) times daily as needed.     HYDROcodone-acetaminophen 5-325 mg per tablet  Commonly known as: NORCO  Take 1 tablet by mouth every 6 (six) hours as needed.     meloxicam 15 MG tablet  Commonly known as: MOBIC  Take 1 tablet (15 mg total) by mouth once daily.     multivitamin capsule  Take 1 capsule by mouth once daily.          Discharge Procedure Orders   Diet general     Call MD for:  temperature >100.4     Call MD for:  persistent nausea and vomiting     Call MD for:  severe uncontrolled pain     Call MD for:  difficulty breathing, headache or visual disturbances     Call MD for:  redness, tenderness, or signs of infection (pain, swelling, redness, odor or green/yellow discharge around incision site)     Call MD for:  hives     Call MD for:  persistent dizziness or light-headedness     Call MD for:  extreme fatigue     Sponge bath only until clinic visit     Keep surgical extremity elevated     Lifting restrictions   Order Comments: No lifting with operative extremity     No driving, operating heavy equipment or signing legal documents while taking pain medication.     Leave dressing on - Keep it clean, dry, and intact until clinic visit     Follow-up Information     Marilyn Crowe PA-C On 11/19/2020.    Specialties: Hand Surgery, Orthopedic Surgery  Contact information:  [address]  [facility] [address]  [phone]

## 2020-11-10 NOTE — TELEPHONE ENCOUNTER
Called and left a voicemail for the patient to call the office and update us about the swelling she was experiencing yesterday. Provided the clinic's number.

## 2020-11-13 ENCOUNTER — OFFICE VISIT (OUTPATIENT)
Dept: ORTHOPEDICS | Facility: CLINIC | Age: 55
End: 2020-11-13
Payer: COMMERCIAL

## 2020-11-13 ENCOUNTER — TELEPHONE (OUTPATIENT)
Dept: ORTHOPEDICS | Facility: CLINIC | Age: 55
End: 2020-11-13

## 2020-11-13 DIAGNOSIS — Z98.890 POST-OPERATIVE STATE: Primary | ICD-10-CM

## 2020-11-13 PROCEDURE — 99024 POSTOP FOLLOW-UP VISIT: CPT | Mod: S$GLB,,, | Performed by: PHYSICIAN ASSISTANT

## 2020-11-13 PROCEDURE — 29105 PR APPLY LONG ARM SPLINT: ICD-10-PCS | Mod: 58,LT,S$GLB, | Performed by: PHYSICIAN ASSISTANT

## 2020-11-13 PROCEDURE — 99999 PR PBB SHADOW E&M-EST. PATIENT-LVL II: CPT | Mod: PBBFAC,,, | Performed by: PHYSICIAN ASSISTANT

## 2020-11-13 PROCEDURE — 99024 PR POST-OP FOLLOW-UP VISIT: ICD-10-PCS | Mod: S$GLB,,, | Performed by: PHYSICIAN ASSISTANT

## 2020-11-13 PROCEDURE — 29105 APPLICATION LONG ARM SPLINT: CPT | Mod: 58,LT,S$GLB, | Performed by: PHYSICIAN ASSISTANT

## 2020-11-13 PROCEDURE — 99999 PR PBB SHADOW E&M-EST. PATIENT-LVL II: ICD-10-PCS | Mod: PBBFAC,,, | Performed by: PHYSICIAN ASSISTANT

## 2020-11-13 NOTE — TELEPHONE ENCOUNTER
Spoke to patient and let her know she can come in today to have a dressing change between 1-4.----- Message from Ijeoma Taylor sent at 11/13/2020 10:36 AM CST -----  Regarding: Patient call back  Who called: ANGELA GRACE [[id number]]    What is the request in detail: Patient is requesting a call back. She states she is having discomfort from the splint, she states she is not having any swelling, but the splint is causing her to have trouble sleeping. She would like to further discuss.   Please advise.    Can the clinic reply by [facility]? No    Best call back number: [phone]    Additional Information: N/A

## 2020-11-17 ENCOUNTER — TELEPHONE (OUTPATIENT)
Dept: ORTHOPEDICS | Facility: CLINIC | Age: 55
End: 2020-11-17

## 2020-11-19 ENCOUNTER — OFFICE VISIT (OUTPATIENT)
Dept: ORTHOPEDICS | Facility: CLINIC | Age: 55
End: 2020-11-19
Payer: COMMERCIAL

## 2020-11-19 ENCOUNTER — TELEPHONE (OUTPATIENT)
Dept: ORTHOPEDICS | Facility: CLINIC | Age: 55
End: 2020-11-19

## 2020-11-19 ENCOUNTER — HOSPITAL ENCOUNTER (OUTPATIENT)
Dept: RADIOLOGY | Facility: OTHER | Age: 55
Discharge: HOME | End: 2020-11-19
Attending: PHYSICIAN ASSISTANT
Payer: COMMERCIAL

## 2020-11-19 VITALS
BODY MASS INDEX: 34.15 KG/M2 | HEART RATE: 70 BPM | WEIGHT: 173.94 LBS | HEIGHT: 60 IN | SYSTOLIC BLOOD PRESSURE: 121 MMHG | DIASTOLIC BLOOD PRESSURE: 86 MMHG

## 2020-11-19 DIAGNOSIS — G56.22 ULNAR NEUROPATHY OF LEFT UPPER EXTREMITY: ICD-10-CM

## 2020-11-19 DIAGNOSIS — Z47.89 ORTHOPEDIC AFTERCARE: ICD-10-CM

## 2020-11-19 DIAGNOSIS — G56.03 BILATERAL CARPAL TUNNEL SYNDROME: Primary | ICD-10-CM

## 2020-11-19 PROCEDURE — 99999 PR PBB SHADOW E&M-EST. PATIENT-LVL III: ICD-10-PCS | Mod: PBBFAC,,, | Performed by: PHYSICIAN ASSISTANT

## 2020-11-19 PROCEDURE — 73080 X-RAY EXAM OF ELBOW: CPT | Mod: TC,FY,LT

## 2020-11-19 PROCEDURE — 1125F AMNT PAIN NOTED PAIN PRSNT: CPT | Mod: S$GLB,,, | Performed by: PHYSICIAN ASSISTANT

## 2020-11-19 PROCEDURE — 1125F PR PAIN SEVERITY QUANTIFIED, PAIN PRESENT: ICD-10-PCS | Mod: S$GLB,,, | Performed by: PHYSICIAN ASSISTANT

## 2020-11-19 PROCEDURE — 3008F BODY MASS INDEX DOCD: CPT | Mod: CPTII,S$GLB,, | Performed by: PHYSICIAN ASSISTANT

## 2020-11-19 PROCEDURE — 99024 PR POST-OP FOLLOW-UP VISIT: ICD-10-PCS | Mod: S$GLB,,, | Performed by: PHYSICIAN ASSISTANT

## 2020-11-19 PROCEDURE — 3008F PR BODY MASS INDEX (BMI) DOCUMENTED: ICD-10-PCS | Mod: CPTII,S$GLB,, | Performed by: PHYSICIAN ASSISTANT

## 2020-11-19 PROCEDURE — 99024 POSTOP FOLLOW-UP VISIT: CPT | Mod: S$GLB,,, | Performed by: PHYSICIAN ASSISTANT

## 2020-11-19 PROCEDURE — 73080 XR ELBOW COMPLETE 3 VIEW LEFT: ICD-10-PCS | Mod: 26,LT,, | Performed by: RADIOLOGY

## 2020-11-19 PROCEDURE — 73080 X-RAY EXAM OF ELBOW: CPT | Mod: 26,LT,, | Performed by: RADIOLOGY

## 2020-11-19 PROCEDURE — 99999 PR PBB SHADOW E&M-EST. PATIENT-LVL III: CPT | Mod: PBBFAC,,, | Performed by: PHYSICIAN ASSISTANT

## 2020-11-19 NOTE — PROGRESS NOTES
"Ms. Saavedra is here today for a post-operative visit.  She is 15 days status post left carpal tunnel release with left ulnar nerve decompression at the elbow and right carpal tunnel injection by Dr. Muniz on 11/4/2020. She reports that she is doing well, reports no numbness in the left hand and only 2 episodes of numbness in the right."  Pain is mild.  She is taking pain medication as needed.  She denies fever, chills, and sweats since the time of the surgery.     Physical exam:    Vitals:    11/19/20 1310   BP: 121/86   Pulse: 70   Weight: 78.9 kg (173 lb 15.1 oz)   Height: 5' (1.524 m)   PainSc:   4     Vital signs are stable, patient is afebrile.  Patient is well dressed and well groomed, no acute distress.  Alert and oriented to person, place, and time.  Post op dressing taken down.  Incision is clean, dry, and intact.  There is no erythema or exudate.  There is no sign of any infection. She is NVI. Sutures in suture tails removed without difficulty.  Fair to good finger and wrist range of motion, fair to good elbow range of motion.    Assessment: 15 days status post left carpal tunnel release with left ulnar nerve decompression at the elbow and right carpal tunnel injection    Plan:  Jeannie was seen today for post-op evaluation.    Diagnoses and all orders for this visit:    Bilateral carpal tunnel syndrome  -     Ambulatory referral/consult to Physical/Occupational Therapy    Ulnar neuropathy of left upper extremity  -     Ambulatory referral/consult to Physical/Occupational Therapy    Orthopedic aftercare        - PO instruction reviewed and provided to patient  - Gel brace provided  - Ace wrap left elbow for comfort  - continue carpal tunnel brace on the right wrist nightly as needed  - orders for OT  - Discussed scar massage  - discussed lifting limitations  - Follow up in 4 weeks   - Call with questions or concerns          "

## 2020-11-19 NOTE — TELEPHONE ENCOUNTER
Attempted to contact pt. Left voicemail informed pt that her appt will now be with LAWRENCE Johnston instead of LAWRENCE Cali. Asked pt to return call to clinic at [phone] c additional questions/concerns.

## 2020-11-24 ENCOUNTER — CLINICAL SUPPORT (OUTPATIENT)
Dept: REHABILITATION | Facility: HOSPITAL | Age: 55
End: 2020-11-24
Payer: COMMERCIAL

## 2020-11-24 DIAGNOSIS — M25.531 PAIN OF BOTH WRIST JOINTS: ICD-10-CM

## 2020-11-24 DIAGNOSIS — M25.532 PAIN OF BOTH WRIST JOINTS: ICD-10-CM

## 2020-11-24 DIAGNOSIS — M62.81 MUSCLE WEAKNESS: ICD-10-CM

## 2020-11-24 DIAGNOSIS — Z98.890 POST-OPERATIVE STATE: ICD-10-CM

## 2020-11-24 DIAGNOSIS — M25.60 STIFFNESS IN JOINT: ICD-10-CM

## 2020-11-24 PROCEDURE — 97165 OT EVAL LOW COMPLEX 30 MIN: CPT | Mod: PN

## 2020-11-24 NOTE — PATIENT INSTRUCTIONS
"[facility], OCCUPATIONAL THERAPY  HOME EXERCISE PROGRAM     Complete the following two massages for 2 minutes, 3x/day:                                                       Complete the following exercises for 10-20 repetitions, 3x/day:     AROM: Elbow Flexion / Extension        Bend and straighten elbow in 3 different positions:   thumb up, palm up, palm down.      AROM: Supination / Pronation   With your elbow by your side, turn your   palm up then turn your palm down.      AROM: Wrist Flexion / Extension               Bend your wrist forward and back as far as possible.          AROM: Wrist Radial / Ulnar Deviation  Bend your wrist from side to side as far as possible.            AROM: Isolated MCP Flexion / Extension ("Wave")   Bend only your large, bottom knuckles. Hold 3 seconds.   Keep the tips of your fingers straight. Straighten fingers.      AROM: Isolated IPJ Flexion / Extension ("Hook")   Bend only your middle and end knuckles. Hold 3 seconds.   Straighten your fingers.       AROM: MCP and PIP Flexion / Extension ("Straight Fist")  Bend your bottom and middle knuckles, keeping the tips of your fingers straight.   Try to touch the pads of your fingers on your palm. Hold 3 seconds.   Straighten your fingers.       AROM: Composite Flexion / Extension ("Full Fist")  Bend every joint in your hand into a fist. Hold 3 seconds.   Straighten your fingers.         AROM: Composte Extension ("Finger Lifts")  Lift your finger off of the table one at a time. Hold 3 seconds.   Relax your finger.      AROM: Abduction / Adduction  With hand flat on table, spread all fingers apart,   then bring them together as close as possible.            AROM: Composite Flexion   Bend both joints of thumb as far as possible.   Try to touch base of little finger.      AROM: Radial Adduction / Abduction  Place your palm flat on the table. Move thumb out   to side. Move back alongside index finger.                          " "             AROM: Palmar Adduction / Abduction   Rest your small finger on the table. Move thumb   sideways, out and away from   palm. Move back to rest along palm.                        AROM: Opposition   Touch tip of thumb to nail tip of each  finger in turn, making an "O" shape.      AROM: Composite Movement Circumduction  Make clockwise circles with thumb. Reverse and make counterclockwise   circles   with thumb.                                    AROM: Composite Flesion ("Pinky Slides")  Touch thumb to tip of small finger. Slide thumb down   small finger into palm.         AROM: MP Extension   With palm on table, lift thumb up.   Hold 3 seconds. Relax and lower thumb.      Therapist: NILSON Verdin        "

## 2020-11-25 NOTE — PLAN OF CARE
[facility] Occupational Therapy  Initial Evaluation     Date: 11/24/2020  Name: Jeannie Saavedra  Clinic Number: [id number]    Therapy Diagnosis:   Encounter Diagnoses   Name Primary?    Post-operative state     Stiffness in joint     Pain of both wrist joints     Muscle weakness      Physician: Marilyn Crowe PA-C    Physician Orders: Eval and Tx   Note: s/p L CTR with L ulnar nerve decompression at the elbow and R CT injection. Eval and treat, modalities as needed. 8+ visits    Medical Diagnosis:   G56.03 (ICD-10-CM) - Bilateral carpal tunnel syndrome   G56.22 (ICD-10-CM) - Ulnar neuropathy of left upper extremity     Surgical Procedure and Date: 11/4/2020  -RELEASE, CARPAL TUNNEL-Left (Left)  -DECOMPRESSION, NERVE, ULNAR-left elbow (Left)  -INJECTION, STEROID-right carpal tunnel (Right)    Date of Injury/Onset: gradual onset, unknown date  Evaluation Date: 11/24/2020  Insurance Authorization Period Expiration: 11/19/2021  Plan of Care Certification Period: 1/22/2021  Date of Return to MD: 12/17/2020    Visit # / Visits authorized: 1 / 1    FOTO: initial eval  Medicare Amount:     Time In: 3:49  Time Out: 4:21  Total treatment time: 32 minutes  Total Timed minutes: 32 minutes    Precautions:  Standard and Weightbearing    Subjective     Involved Side: BUE, L side post-op  Dominant Side: Right  Date of Onset: unknown date of onset, surgery on 11/4/2020  Mechanism of Injury: gradual onset  History of Current Condition: pt had CTR of L wrist/hand and ulnar nerve decompression of the L elbow. Pt has CTS on the R as well.   Surgical Procedure: L CTR, L ulnar nerve decompression  Imaging: bone scan films-  - Hands on 9/24/2020: No acute findings or significant degenerative change.  - L elbow on 9/25/2020: No acute osseous abnormality seen.  Previous Therapy: none    Past Medical History/Physical Systems Review:   Jeannie Saavedra  has a past medical history of Allergy, Asthma, Keloid cicatrix,  Nephrolithiasis, Osteoarthritis of lumbar spine, and Sciatica.    Jeannie Saavedra  has a past surgical history that includes  section; Hysterectomy; Colonoscopy (N/A, 2018); Carpal tunnel release (Left, 2020); and Injection of steroid (Right, 2020).    Jeannie has a current medication list which includes the following prescription(s): ammonium lactate, calcium/vitamin d3, cyproheptadine, hydrocodone-acetaminophen, meloxicam, and multivitamin.    Review of patient's allergies indicates:  No Known Allergies     Patient's Goals for Therapy: to be pain free     Pain:  Functional Pain Scale Rating 0-10:   5/10 on average  5/10 at best  10/10 at worst  Location: wrist, elbow, and shoulder  Description: Sharp  Aggravating Factors: Bending, Extension, Flexing and Lifting  Easing Factors: pain medication and rest    Occupation:    Working presently: employed, but currently not working due to recent surgery   Duties: driving    Functional Limitations/Social History:    Previous functional status includes: Independent with all ADLs.     Current FunctionalStatus   Home/Living environment : lives alone      Limitation of Functional Status as follows:   ADLs/IADLs:     - Feeding: IND    - Bathing: IND    - Dressing/Grooming: IND    - Driving: IND     Leisure: Driving and going for walks        Objective     Observation/Appearance: edema present. Incision healing well with some scabbing remaining    Edema. Measured in centimeters.   2020    Left Right   2in. Above elbow 26.5 25.2   2in. Below elbow 24.5 24.4   Wrist Crease 15.4 14.4   Figure of 8 17.5 18   MCPs 16.1 15.8     Edema. Measured in centimeters.   2020    Left Right   Long:       P1 6 6    PIP 5.9 6.1   P2 5.4 5.4    DIP 4.9 5   P3 4.6 4.8   Thumb:     Prox. Phalanx 6.5 6   IP 6.3 6   Distal Phalanx 5.7 5.4     Elbow and Wrist ROM. Measured in degrees.   2020    Left Right   Elbow Ext/Flex  157/131 167/150   Supination/Pronation 74/WNL WNL/WNL   Wrist Ext/Flex 40/28 60/64   Wrist RD/UD 9/15 28/26     Hand ROM. Measured in degrees.   11/24/2020 11/24/2020    Left Right        Index: MP  33 72              PIP     90 106              DIP 52 69              HEBERT 175 247        Long:  MP 42 85              PIP 93 109              DIP 56 75              HEBERT 191 269        Ring:   MP 26 84              PIP 98 109              DIP 51 60              HEBERT 175 253        Small:  MP 20 88               PIP 85 103               DIP 44 52              HEBERT 149 243        Thumb: MP 41 68                IP 44 74       Rad ADD/ABD 56 63       Pal ADD/ABD 47 50          Opposition Tip of SF Base of SF      Strength (Dynamometer) and Pinch Strength (Pinch Gauge)  Measured in pounds.   11/24/2020 11/24/2020    Left Right   Rung II Deferred Deferred   Key Pinch     3pt Pinch     2pt Pinch         Manual Muscle Test   11/24/2020 11/24/2020    Left Right   Wrist Extension  Deferred Deferred   Wrist Flexion     Radial Deviation     Ulnar Deviation     Supination     Pronation     Elbow Extension     Elbow Flexion           CMS Impairment/Limitation/Restriction for FOTO wrist Survey    Therapist reviewed FOTO scores for Jeannie Saavedra on 11/24/2020.   FOTO documents entered into [employer] - see Media section.    Limitation Score: 75%         Treatment     Treatment Time In: N/A  Treatment Time Out: N/A  Total Treatment time separate from Evaluation time: eval only     Jeannie received therapeutic exercises for 0 minutes including:  -HEP reviewed with pt. Pt did not perform any exercises but demonstrated understanding.    Home Exercise Program/Education:  Issued HEP (see patient instructions in EMR) and educated on modality use for pain management . Exercises were reviewed and Jeannie was able to demonstrate them prior to the end of the session.   Pt received a written copy of exercises to perform at home. Jeannie demonstrated good   understanding of the education provided.  Pt was advised to perform these exercises free of pain, and to stop performing them if pain occurs.    Patient/Family Education: role of OT, goals for OT, scheduling/cancellations - pt verbalized understanding. Discussed insurance limitations with patient.    Additional Education provided: none    Assessment     Jeannie Saavedra is a 54 y.o. female referred to outpatient occupational therapy and presents with a medical diagnosis of L CTR, L ulnar nerve decompression at elbow, R CTS, resulting in pain, edema, decreased ROM, decreased strength and demonstrates limitations as described in the chart below. Following medical record review it is determined that pt will benefit from occupational therapy services in order to maximize pain free and/or functional use of bilateral UEs. The following goals were discussed with the patient and patient is in agreement with them as to be addressed in the treatment plan. The patient's rehab potential is Good.     Anticipated barriers to occupational therapy: pt very guarded   Pt has no cultural, educational or language barriers to learning provided.    Profile and History Assessment of Occupational Performance Level of Clinical Decision Making Complexity Score   Occupational Profile:   Jeannie Saavedra is a 54 y.o. female who lives alone and is currently employed as , but is not working due to recent surgery. Jeannie Saavedra has difficulty with  bathing, grooming and dressing  driving/transportation management, shopping, phone/computer use and housework/household chores  affecting his/her daily functional abilities. His/her main goal for therapy is to relieve pain.     Comorbidities:   anxiety, COPD/asthma, depression, high BMI and recurrent urinary infections    Medical and Therapy History Review:   Brief               Performance Deficits    Physical:  Joint Mobility  Muscle Power/Strength  Muscle Endurance  Skin Integrity/Scar  Formation  Edema   Strength  Pinch Strength  Pain    Cognitive:  No Deficits    Psychosocial:    Social Interaction  Habits  Routines  Rituals     Clinical Decision Making:  low    Assessment Process:  Detailed Assessments    Modification/Need for Assistance:  Not Necessary    Intervention Selection:  Several Treatment Options       low  Based on PMHX, co morbidities , data from assessments and functional level of assistance required with task and clinical presentation directly impacting function.         Goals:   The following goals were discussed with the patient and patient is in agreement with them as to be addressed in the treatment plan.   Short term Goals:  1) Initiate HEP Progressing 11/24/2020  2) Pt will increase L wrist active extension and flexion by 10 degrees each in order to assist with self-care activities by 4 weeks.  3) Pt will increase L elbow active extension and flexion by 5-10 degrees each for dressing and other self-care tasks by 4 weeks.  4) Pt will reduce edema by at least 1 cm in L wrist by 4 weeks.  5) Pt will reduce pain to less than 4/10 with self-care activities by 4 weeks.  6) Patient will be able to achieve less than or equal to 60% on the FOTO, demonstrating overall improved functional ability with upper extremity. (Self-care category)    Long Term Goals:  Goals to be met by discharge:  1) Independent with HEP  2) Pt will increase HEBERT of L fingers (all) by 30-50 degrees in order to increase functional grasp with home management and work related tasks by d/c.   3) Pt will decrease edema to trace or none to increase functional ROM by d/c.   4) Pt will increase functional  strength of L hand by 5-10# in order to A in opening containers for home management tasks by 4 weeks.   5) Pt will decrease pain to trace or none while completing light home management tasks and work related tasks by d/c.   6) Patient will be able to achieve less than or equal to 44% on the FOTO, demonstrating  overall improved functional ability with upper extremity.  (Self-care category)        Plan   Certification Period/Plan of care expiration: 11/24/2020 to 1/22/2021.    Outpatient Occupational Therapy 2 times weekly for 8 weeks to include the following interventions: Paraffin, Fluidotherapy, Manual therapy/joint mobilizations, Modalities for pain management, Therapeutic exercises/activities., Strengthening, Orthotic Fabrication/Fit/Training, Edema Control, Scar Management, Wound Care, Electrical Modalities, Joint Protection and Energy Conservation.      TAMANNA Verdin

## 2020-11-26 ENCOUNTER — PATIENT MESSAGE (OUTPATIENT)
Dept: ORTHOPEDICS | Facility: CLINIC | Age: 55
End: 2020-11-26

## 2020-11-27 ENCOUNTER — TELEPHONE (OUTPATIENT)
Dept: ORTHOPEDICS | Facility: CLINIC | Age: 55
End: 2020-11-27

## 2020-11-27 NOTE — TELEPHONE ENCOUNTER
Lm on vm informing pt to the incision does not look infected but it needs to stay dry.  If gel brace is causing it to stay wet, avoid it.  Pt was informed she can come in for a wound check but incision needs to stay dry.

## 2020-11-27 NOTE — TELEPHONE ENCOUNTER
----- Message from Patrica Knight sent at 11/27/2020 12:43 PM CST -----  Regarding: Returning Staff Call  Who Called: ANGELA GRACE [[phone]]    What is the request in detail:Returning Pallavi call in regards to her hand. Please contact to further discuss.    Can the clinic reply by  [facility]? No    Best Call Back Number: [phone]

## 2020-11-30 ENCOUNTER — OFFICE VISIT (OUTPATIENT)
Dept: ORTHOPEDICS | Facility: CLINIC | Age: 55
End: 2020-11-30
Payer: COMMERCIAL

## 2020-11-30 VITALS
BODY MASS INDEX: 33.96 KG/M2 | HEART RATE: 88 BPM | SYSTOLIC BLOOD PRESSURE: 140 MMHG | HEIGHT: 60 IN | WEIGHT: 173 LBS | DIASTOLIC BLOOD PRESSURE: 83 MMHG

## 2020-11-30 DIAGNOSIS — G56.03 BILATERAL CARPAL TUNNEL SYNDROME: ICD-10-CM

## 2020-11-30 DIAGNOSIS — G56.22 ULNAR NEUROPATHY OF LEFT UPPER EXTREMITY: Primary | ICD-10-CM

## 2020-11-30 DIAGNOSIS — Z47.89 ORTHOPEDIC AFTERCARE: ICD-10-CM

## 2020-11-30 PROCEDURE — 3008F BODY MASS INDEX DOCD: CPT | Mod: CPTII,S$GLB,, | Performed by: PHYSICIAN ASSISTANT

## 2020-11-30 PROCEDURE — 99999 PR PBB SHADOW E&M-EST. PATIENT-LVL III: ICD-10-PCS | Mod: PBBFAC,,, | Performed by: PHYSICIAN ASSISTANT

## 2020-11-30 PROCEDURE — 1125F PR PAIN SEVERITY QUANTIFIED, PAIN PRESENT: ICD-10-PCS | Mod: S$GLB,,, | Performed by: PHYSICIAN ASSISTANT

## 2020-11-30 PROCEDURE — 3008F PR BODY MASS INDEX (BMI) DOCUMENTED: ICD-10-PCS | Mod: CPTII,S$GLB,, | Performed by: PHYSICIAN ASSISTANT

## 2020-11-30 PROCEDURE — 99024 PR POST-OP FOLLOW-UP VISIT: ICD-10-PCS | Mod: S$GLB,,, | Performed by: PHYSICIAN ASSISTANT

## 2020-11-30 PROCEDURE — 99024 POSTOP FOLLOW-UP VISIT: CPT | Mod: S$GLB,,, | Performed by: PHYSICIAN ASSISTANT

## 2020-11-30 PROCEDURE — 1125F AMNT PAIN NOTED PAIN PRSNT: CPT | Mod: S$GLB,,, | Performed by: PHYSICIAN ASSISTANT

## 2020-11-30 PROCEDURE — 99999 PR PBB SHADOW E&M-EST. PATIENT-LVL III: CPT | Mod: PBBFAC,,, | Performed by: PHYSICIAN ASSISTANT

## 2020-11-30 NOTE — PROGRESS NOTES
Ms. Saavedra is here today for a post-operative visit.  She is 26 days status post left carpal tunnel release with left ulnar nerve decompression at the elbow and right carpal tunnel injection by Dr. Muniz on 11/4/2020. She presents today for evaluation of scar pain. She has started OT She is taking pain medication as needed.  She denies fever, chills, and sweats since the time of the surgery.     Physical exam:    Vitals:    11/30/20 1346   BP: (!) 140/83   Pulse: 88   Weight: 78.5 kg (173 lb)   Height: 5' (1.524 m)   PainSc:   4     Vital signs are stable, patient is afebrile.  Patient is well dressed and well groomed, no acute distress.  Alert and oriented to person, place, and time.  Incisions are healing well - clean, dry, and intact.  Palm incision with scab in place distally.  Incisions are tender to palpation, mild tenderness also noted over the posterior elbow.  There is no erythema or exudate.  There is no sign of any infection. She is NVI. Fair to good finger and wrist range of motion, fair to good elbow range of motion.    Assessment: 26 days status post left carpal tunnel release with left ulnar nerve decompression at the elbow and right carpal tunnel injection    Plan:  Jeannie was seen today for pain.    Diagnoses and all orders for this visit:    Ulnar neuropathy of left upper extremity    Bilateral carpal tunnel syndrome    Orthopedic aftercare        - Regular OT and HEP  - continue carpal tunnel brace on the right wrist nightly as needed  - Discussed regular scar massage  - discussed lifting limitations  - Follow up in 2 weeks as scheduled, sooner if needed   - Call with questions or concerns

## 2020-12-03 ENCOUNTER — CLINICAL SUPPORT (OUTPATIENT)
Dept: REHABILITATION | Facility: HOSPITAL | Age: 55
End: 2020-12-03
Payer: COMMERCIAL

## 2020-12-03 DIAGNOSIS — M25.60 STIFFNESS IN JOINT: ICD-10-CM

## 2020-12-03 DIAGNOSIS — M62.81 MUSCLE WEAKNESS: ICD-10-CM

## 2020-12-03 DIAGNOSIS — M25.532 PAIN OF BOTH WRIST JOINTS: ICD-10-CM

## 2020-12-03 DIAGNOSIS — M25.531 PAIN OF BOTH WRIST JOINTS: ICD-10-CM

## 2020-12-03 PROCEDURE — 97110 THERAPEUTIC EXERCISES: CPT | Mod: PN

## 2020-12-03 PROCEDURE — 97018 PARAFFIN BATH THERAPY: CPT | Mod: PN

## 2020-12-03 PROCEDURE — 97140 MANUAL THERAPY 1/> REGIONS: CPT | Mod: PN

## 2020-12-03 NOTE — PROGRESS NOTES
"    Occupational Therapy Daily Treatment Note     Name: Jeannie Saavedra  Clinic Number: [id number]    Therapy Diagnosis: No diagnosis found.  Physician: Marilyn Crowe PA-C    Visit Date: 12/3/2020  Physician Orders: Eval and Tx   Note: s/p L CTR with L ulnar nerve decompression at the elbow and R CT injection. Eval and treat, modalities as needed. 8+ visits     Medical Diagnosis:   G56.03 (ICD-10-CM) - Bilateral carpal tunnel syndrome   G56.22 (ICD-10-CM) - Ulnar neuropathy of left upper extremity      Surgical Procedure and Date: 11/4/2020  -RELEASE, CARPAL TUNNEL-Left (Left)  -DECOMPRESSION, NERVE, ULNAR-left elbow (Left)  -INJECTION, STEROID-right carpal tunnel (Right)    Date of Injury/Onset: gradual onset, unknown date  Evaluation Date: 11/24/2020  Insurance Authorization Period Expiration: 11/19/2021  Plan of Care Certification Period: 1/22/2021  Date of Return to MD: 12/17/2020    Visit # / Visits authorized: 1 /24      FOTO: initial eval  Medicare Amount:      Time In: 5:15 pm   Time Out: 5:55 pm   Total treatment time: 40 minutes  Total Timed minutes: 40 minutes     Precautions:  Standard and Weightbearing    Subjective     Pt reports: "today is not a good day I had to take a goody and the elbow is the worst part now"  she was compliant with home exercise program given last session.   Response to previous treatment: none noted  Functional change: none noted yet    Pain: 6/10  Location: left elbow  and hands      Objective   Observation/Appearance: edema present. Incision healing well with some scabbing remaining     Edema. Measured in centimeters.    11/24/2020 11/24/2020     Left Right   2in. Above elbow 26.5 25.2   2in. Below elbow 24.5 24.4   Wrist Crease 15.4 14.4   Figure of 8 17.5 18   MCPs 16.1 15.8      Edema. Measured in centimeters.    11/24/2020 11/24/2020     Left Right   Long:         P1 6 6    PIP 5.9 6.1   P2 5.4 5.4    DIP 4.9 5   P3 4.6 4.8   Thumb:       Prox. Phalanx 6.5 6   IP 6.3 6 "   Distal Phalanx 5.7 5.4      Elbow and Wrist ROM. Measured in degrees.    11/24/2020 11/24/2020     Left Right   Elbow Ext/Flex 157/131 167/150   Supination/Pronation 74/WNL WNL/WNL   Wrist Ext/Flex 40/28 60/64   Wrist RD/UD 9/15 28/26      Hand ROM. Measured in degrees.    11/24/2020 11/24/2020     Left Right           Index: MP  33 72              PIP     90 106              DIP 52 69              HEBERT 175 247           Long:  MP 42 85              PIP 93 109              DIP 56 75              HEBERT 191 269           Ring:   MP 26 84              PIP 98 109              DIP 51 60              HEBERT 175 253           Small:  MP 20 88               PIP 85 103               DIP 44 52              HEBERT 149 243           Thumb: MP 41 68                IP 44 74       Rad ADD/ABD 56 63       Pal ADD/ABD 47 50          Opposition Tip of SF Base of SF       Strength (Dynamometer) and Pinch Strength (Pinch Gauge)  Measured in pounds.    11/24/2020 11/24/2020     Left Right   Rung II Deferred Deferred   Key Pinch       3pt Pinch       2pt Pinch             Manual Muscle Test    11/24/2020 11/24/2020     Left Right   Wrist Extension  Deferred Deferred   Wrist Flexion       Radial Deviation       Ulnar Deviation       Supination       Pronation       Elbow Extension       Elbow Flexion            Jeannie received the following supervised modalities after being cleared for contradictions for 10 minutes:   -Paraffin to L hand w/ MHP to L elbow for 10 min, pre-tx to decrease pain & increase tissue extensibility      Jeannie received the following manual therapy techniques for 10 minutes:   -Pt received retrograde massage as well as scar massage to decrease edema and stiffness for increased ROM. STM performed to decreased stiffness in surrounding musculature.     Jeannie received therapeutic exercises for 20 minutes including:  -  Golf ball scar massage  2 min    Wrist AROM  Flx/ext  RD/UD X 10   Elbow AROM  Flx/ext  Pro/sup 3  ways  X 10   X 10    Wrist wheel 2 ways 2 min    Wave  Hook  straight fist, composite fist finger spreads finger lifts  EDM isolated    X 20 reps each    Opposition  X 20            Home Exercises and Education Provided     Education provided:   - HEP in place  - Progress towards goals     Written Home Exercises Provided: Patient instructed to cont prior HEP.  Exercises were reviewed and Jeannie was able to demonstrate them prior to the end of the session.  Jeannie demonstrated good  understanding of the HEP provided.   .   See EMR under Patient Instructions for exercises provided prior visit.        Assessment     Pt would continue to benefit from skilled OT. Pt still having increased pain and guarding with movements. She did tolerate the exercises although had deficits due to pain and low activity tolerance. She was unable to complete the whole session due to fatigue and soreness. Educated of continued HEP need and progression of next session for goals to increase tolerance with pt showing good understanding of exercises.   Jeannie is progressing well towards her goals and there are no updates to goals at this time. Pt prognosis is Good.     Pt will continue to benefit from skilled outpatient occupational therapy to address the deficits listed in the problem list on initial evaluation provide pt/family education and to maximize pt's level of independence in the home and community environment.     Anticipated barriers to occupational therapy: multiple surgery sites and non operated hand affected also     Pt's spiritual, cultural and educational needs considered and pt agreeable to plan of care and goals.    Goals:  Short term Goals:  1) Initiate HEP Progressing 11/24/2020  2) Pt will increase L wrist active extension and flexion by 10 degrees each in order to assist with self-care activities by 4 weeks. Progressing 12/3/2020  3) Pt will increase L elbow active extension and flexion by 5-10 degrees each for dressing  and other self-care tasks by 4 weeks. Progressing 12/3/2020  4) Pt will reduce edema by at least 1 cm in L wrist by 4 weeks. Progressing 12/3/2020  5) Pt will reduce pain to less than 4/10 with self-care activities by 4 weeks. Progressing 12/3/2020  6) Patient will be able to achieve less than or equal to 60% on the FOTO, demonstrating overall improved functional ability with upper extremity. (Self-care category) Progressing 12/3/2020     Long Term Goals:  Goals to be met by discharge:  1) Independent with HEP Progressing 12/3/2020  2) Pt will increase HEBERT of L fingers (all) by 30-50 degrees in order to increase functional grasp with home management and work related tasks by d/c. Progressing 12/3/2020  3) Pt will decrease edema to trace or none to increase functional ROM by d/c. Progressing 12/3/2020  4) Pt will increase functional  strength of L hand by 5-10# in order to A in opening containers for home management tasks by 4 weeks. Progressing 12/3/2020  5) Pt will decrease pain to trace or none while completing light home management tasks and work related tasks by d/c. Progressing 12/3/2020  6) Patient will be able to achieve less than or equal to 44% on the FOTO, demonstrating overall improved functional ability with upper extremity.  (Self-care category) Progressing 12/3/2020      Plan   Continue per initial POC.   Updates/Grading for next session: Progress as tolerated.       Judd Aggarwal, OT

## 2020-12-05 PROBLEM — M25.511 ACUTE PAIN OF BOTH SHOULDERS: Status: RESOLVED | Noted: 2020-09-25 | Resolved: 2020-12-05

## 2020-12-05 PROBLEM — M54.2 MUSCLE PAIN, CERVICAL: Status: RESOLVED | Noted: 2020-09-25 | Resolved: 2020-12-05

## 2020-12-05 PROBLEM — M54.2 NECK PAIN: Status: RESOLVED | Noted: 2020-09-25 | Resolved: 2020-12-05

## 2020-12-05 PROBLEM — M79.18 MUSCLE PAIN, LUMBAR: Status: RESOLVED | Noted: 2020-09-25 | Resolved: 2020-12-05

## 2020-12-05 PROBLEM — M54.50 ACUTE BILATERAL LOW BACK PAIN WITHOUT SCIATICA: Status: RESOLVED | Noted: 2020-09-25 | Resolved: 2020-12-05

## 2020-12-05 PROBLEM — M25.512 ACUTE PAIN OF BOTH SHOULDERS: Status: RESOLVED | Noted: 2020-09-25 | Resolved: 2020-12-05

## 2020-12-07 ENCOUNTER — CLINICAL SUPPORT (OUTPATIENT)
Dept: REHABILITATION | Facility: HOSPITAL | Age: 55
End: 2020-12-07
Payer: COMMERCIAL

## 2020-12-07 DIAGNOSIS — M62.81 MUSCLE WEAKNESS: ICD-10-CM

## 2020-12-07 DIAGNOSIS — M25.532 PAIN OF BOTH WRIST JOINTS: ICD-10-CM

## 2020-12-07 DIAGNOSIS — M25.60 STIFFNESS IN JOINT: ICD-10-CM

## 2020-12-07 DIAGNOSIS — M25.531 PAIN OF BOTH WRIST JOINTS: ICD-10-CM

## 2020-12-07 PROCEDURE — 97140 MANUAL THERAPY 1/> REGIONS: CPT | Mod: PN

## 2020-12-07 PROCEDURE — 97110 THERAPEUTIC EXERCISES: CPT | Mod: PN

## 2020-12-07 NOTE — PROGRESS NOTES
"    Occupational Therapy Daily Treatment Note     Name: Jeannie Saavedra  Clinic Number: [id number]    Therapy Diagnosis:   Encounter Diagnoses   Name Primary?    Stiffness in joint     Pain of both wrist joints     Muscle weakness      Physician: Marilyn Crowe PA-C    Visit Date: 12/7/2020  Physician Orders: Eval and Tx   Note: s/p L CTR with L ulnar nerve decompression at the elbow and R CT injection. Eval and treat, modalities as needed. 8+ visits     Medical Diagnosis:   G56.03 (ICD-10-CM) - Bilateral carpal tunnel syndrome   G56.22 (ICD-10-CM) - Ulnar neuropathy of left upper extremity      Surgical Procedure and Date: 11/4/2020  -RELEASE, CARPAL TUNNEL-Left (Left)  -DECOMPRESSION, NERVE, ULNAR-left elbow (Left)  -INJECTION, STEROID-right carpal tunnel (Right)    Date of Injury/Onset: gradual onset, unknown date  Evaluation Date: 11/24/2020  Insurance Authorization Period Expiration: 11/19/2021  Plan of Care Certification Period: 1/22/2021  Date of Return to MD: 12/17/2020    Visit # / Visits authorized: 2 /24      FOTO: initial eval  Medicare Amount:      Time In: 3:00 pm   Time Out:  pm   Total treatment time: 40 minutes  Total Timed minutes: 40 minutes     Precautions:  Standard and Weightbearing    Subjective     Pt reports: "Its getting better I got maybe a 3 pain in the hand and a 4 in the arm" Can I dip my elbow in the paraffin today?  she was compliant with home exercise program given last session.   Response to previous treatment: none noted  Functional change: none noted yet    Pain: 3-4/10  Location: left elbow  and hands      Objective   Observation/Appearance: edema present. Incision healing well with some scabbing remaining     Edema. Measured in centimeters.    11/24/2020 11/24/2020     Left Right   2in. Above elbow 26.5 25.2   2in. Below elbow 24.5 24.4   Wrist Crease 15.4 14.4   Figure of 8 17.5 18   MCPs 16.1 15.8      Edema. Measured in centimeters.    11/24/2020 11/24/2020     Left Right "   Long:         P1 6 6    PIP 5.9 6.1   P2 5.4 5.4    DIP 4.9 5   P3 4.6 4.8   Thumb:       Prox. Phalanx 6.5 6   IP 6.3 6   Distal Phalanx 5.7 5.4      Elbow and Wrist ROM. Measured in degrees.    11/24/2020 11/24/2020     Left Right   Elbow Ext/Flex 157/131 167/150   Supination/Pronation 74/WNL WNL/WNL   Wrist Ext/Flex 40/28 60/64   Wrist RD/UD 9/15 28/26      Hand ROM. Measured in degrees.    11/24/2020 11/24/2020     Left Right           Index: MP  33 72              PIP     90 106              DIP 52 69              HEBERT 175 247           Long:  MP 42 85              PIP 93 109              DIP 56 75              HEBERT 191 269           Ring:   MP 26 84              PIP 98 109              DIP 51 60              HEBERT 175 253           Small:  MP 20 88               PIP 85 103               DIP 44 52              HEBERT 149 243           Thumb: MP 41 68                IP 44 74       Rad ADD/ABD 56 63       Pal ADD/ABD 47 50          Opposition Tip of SF Base of SF       Strength (Dynamometer) and Pinch Strength (Pinch Gauge)  Measured in pounds.    11/24/2020 11/24/2020     Left Right   Rung II Deferred Deferred   Key Pinch       3pt Pinch       2pt Pinch             Manual Muscle Test    11/24/2020 11/24/2020     Left Right   Wrist Extension  Deferred Deferred   Wrist Flexion       Radial Deviation       Ulnar Deviation       Supination       Pronation       Elbow Extension       Elbow Flexion            Jeannie received the following supervised modalities after being cleared for contradictions for 10 minutes:   -Paraffin to L hand w/ MHP to L elbow for 10 min, pre-tx to decrease pain & increase tissue extensibility    Jeannie received the following manual therapy techniques for 10 minutes:   -Pt received retrograde massage as well as scar massage to decrease edema and stiffness for increased ROM. STM performed to decreased stiffness in surrounding musculature.     Jeannie received therapeutic exercises for 25  minutes including:  -  Golf ball scar massage  2 min    Vibrator for desensitization  2 min    Wrist AROM  Flx/ext  RD/UD X 15 each way   Elbow AROM  Flx/ext   3 ways  X 10      Isospheres 2 min    Wrist wheel 2 ways 2 min    Wave  Hook  straight fist, composite fist finger spreads finger lifts  EDM isolated    X 20 reps each    Opposition  X 20    Light grippers X 10 each            Home Exercises and Education Provided     Education provided:   - HEP in place  - Progress towards goals     Written Home Exercises Provided: Patient instructed to cont prior HEP.  Exercises were reviewed and Jeannie was able to demonstrate them prior to the end of the session.  Jeannie demonstrated good  understanding of the HEP provided.   .   See EMR under Patient Instructions for exercises provided prior visit.        Assessment   Pt 4 weeks 5 days     Pt would continue to benefit from skilled OT. Pt still having increased pain and guarding with movements but has progressed with tolerance and pain level since last session. She is doing fairly well. She would not allow OT to debride CT incision but states she will take care of it. She was educated of need for removal and that this would allow more movement and reduce sensitivity. Pt still having hypersensitivity over CT incision site. Possible desensitization kit education next visit and nerve glides. The elbow is doing well with minor stiffness and pain still.  Jeannie is progressing well towards her goals and there are no updates to goals at this time. Pt prognosis is Good.     Pt will continue to benefit from skilled outpatient occupational therapy to address the deficits listed in the problem list on initial evaluation provide pt/family education and to maximize pt's level of independence in the home and community environment.     Anticipated barriers to occupational therapy: multiple surgery sites and non operated hand affected also     Pt's spiritual, cultural and educational  needs considered and pt agreeable to plan of care and goals.    Goals:  Short term Goals:  1) Initiate HEP Progressing 11/24/2020  2) Pt will increase L wrist active extension and flexion by 10 degrees each in order to assist with self-care activities by 4 weeks. Progressing 12/7/2020  3) Pt will increase L elbow active extension and flexion by 5-10 degrees each for dressing and other self-care tasks by 4 weeks. Progressing 12/7/2020  4) Pt will reduce edema by at least 1 cm in L wrist by 4 weeks. Progressing 12/7/2020  5) Pt will reduce pain to less than 4/10 with self-care activities by 4 weeks. Progressing 12/7/2020  6) Patient will be able to achieve less than or equal to 60% on the FOTO, demonstrating overall improved functional ability with upper extremity. (Self-care category) Progressing 12/7/2020     Long Term Goals:  Goals to be met by discharge:  1) Independent with HEP Progressing 12/7/2020  2) Pt will increase HEBERT of L fingers (all) by 30-50 degrees in order to increase functional grasp with home management and work related tasks by d/c. Progressing 12/7/2020  3) Pt will decrease edema to trace or none to increase functional ROM by d/c. Progressing 12/7/2020  4) Pt will increase functional  strength of L hand by 5-10# in order to A in opening containers for home management tasks by 4 weeks. Progressing 12/7/2020  5) Pt will decrease pain to trace or none while completing light home management tasks and work related tasks by d/c. Progressing 12/7/2020  6) Patient will be able to achieve less than or equal to 44% on the FOTO, demonstrating overall improved functional ability with upper extremity.  (Self-care category) Progressing 12/7/2020      Plan   Continue per initial POC.   Updates/Grading for next session: Progress as tolerated.       Judd Aggarwal, OT

## 2020-12-14 ENCOUNTER — CLINICAL SUPPORT (OUTPATIENT)
Dept: REHABILITATION | Facility: HOSPITAL | Age: 55
End: 2020-12-14
Payer: COMMERCIAL

## 2020-12-14 DIAGNOSIS — M25.532 PAIN OF BOTH WRIST JOINTS: ICD-10-CM

## 2020-12-14 DIAGNOSIS — M62.81 MUSCLE WEAKNESS: ICD-10-CM

## 2020-12-14 DIAGNOSIS — M25.60 STIFFNESS IN JOINT: ICD-10-CM

## 2020-12-14 DIAGNOSIS — M25.531 PAIN OF BOTH WRIST JOINTS: ICD-10-CM

## 2020-12-14 PROCEDURE — 97110 THERAPEUTIC EXERCISES: CPT | Mod: PN

## 2020-12-14 PROCEDURE — 97018 PARAFFIN BATH THERAPY: CPT | Mod: PN

## 2020-12-14 NOTE — PROGRESS NOTES
"    Occupational Therapy Daily Treatment Note     Name: Jeannie Saavedra  Clinic Number: [id number]    Therapy Diagnosis:   Encounter Diagnoses   Name Primary?    Stiffness in joint     Pain of both wrist joints     Muscle weakness      Physician: Marilyn Crowe PA-C    Visit Date: 12/14/2020  Physician Orders: Eval and Tx   Note: s/p L CTR with L ulnar nerve decompression at the elbow and R CT injection. Eval and treat, modalities as needed. 8+ visits     Medical Diagnosis:   G56.03 (ICD-10-CM) - Bilateral carpal tunnel syndrome   G56.22 (ICD-10-CM) - Ulnar neuropathy of left upper extremity      Surgical Procedure and Date: 11/4/2020  -RELEASE, CARPAL TUNNEL-Left (Left)  -DECOMPRESSION, NERVE, ULNAR-left elbow (Left)  -INJECTION, STEROID-right carpal tunnel (Right)    Date of Injury/Onset: gradual onset, unknown date  Evaluation Date: 11/24/2020  Insurance Authorization Period Expiration: 11/19/2021  Plan of Care Certification Period: 1/22/2021  Date of Return to MD: 12/17/2020    Visit # / Visits authorized: 3 /24      FOTO: initial eval  Medicare Amount:      Time In: 3:00 pm   Time Out: 3:45 pm   Total treatment time: 45 minutes  Total Timed minutes: 45 minutes     Precautions:  Standard and Weightbearing    Subjective     Pt reports: "Its hurting today bad bad its like a six I dont know if its the weather or what but its been bothering me?  she was compliant with home exercise program given last session.   Response to previous treatment: none noted  Functional change: none noted yet    Pain: 6/10  Location: left elbow  and hands      Objective   Observation/Appearance: edema present. Incision healing well with some scabbing remaining     Edema. Measured in centimeters.    11/24/2020 11/24/2020 12/14/20     Left Right L   2in. Above elbow 26.5 25.2 27   2in. Below elbow 24.5 24.4 25   Wrist Crease 15.4 14.4 15   Figure of 8 17.5 18 17.6   MCPs 16.1 15.8 16.5      Edema. Measured in centimeters.    11/24/2020 " 11/24/2020      Left Right    Long:          P1 6 6 5.5    PIP 5.9 6.1 5.5   P2 5.4 5.4 5    DIP 4.9 5 4.6   P3 4.6 4.8 4.4   Thumb:        Prox. Phalanx 6.5 6 5.8   IP 6.3 6 5.8   Distal Phalanx 5.7 5.4 5.4      Elbow and Wrist ROM. Measured in degrees.    11/24/2020 11/24/2020 12/14/20     Left Right L   Elbow Ext/Flex 157/131 167/150 171/136   Supination/Pronation 74/WNL WNL/WNL WNL   Wrist Ext/Flex 40/28 60/64 52/45   Wrist RD/UD 9/15 28/26 12/30      Hand ROM. Measured in degrees.    11/24/2020 11/24/2020 12/14/20     Left Right Left            Index: MP  33 72 65              PIP     90 106 92              DIP 52 69 53              HEBERT 175 247             Long:  MP 42 85 78              PIP 93 109 100              DIP 56 75 63              HEBERT 191 269             Ring:   MP 26 84 68              PIP 98 109 102              DIP 51 60 48              HEBERT 175 253             Small:  MP 20 88 83               PIP 85 103 98               DIP 44 52 42              HEBERT 149 243             Thumb: MP 41 68 48                IP 44 74 30       Rad ADD/ABD 56 63 55       Pal ADD/ABD 47 50 32          Opposition Tip of SF Base of SF        Strength (Dynamometer) and Pinch Strength (Pinch Gauge)  Measured in pounds.    12/14/20  Unable due to pain  11/24/2020     Left Right   Rung II Deferred Deferred   Key Pinch       3pt Pinch       2pt Pinch              Jeannie received the following supervised modalities after being cleared for contradictions for 10 minutes:   -Paraffin to L hand w/ MHP to L elbow for 10 min, pre-tx to decrease pain & increase tissue extensibility    Jeannie received the following manual therapy techniques for 10 minutes:   -Pt received retrograde massage as well as scar massage to decrease edema and stiffness for increased ROM. STM performed to decreased stiffness in surrounding musculature.     Jeannie received therapeutic exercises for 25 minutes including:  -  Golf ball scar massage  2 min     Vibrator for desensitization  2 min    Wrist AROM  Flx/ext  RD/UD X 15 each way   Elbow AROM  Flx/ext   3 ways  X 10      Isospheres 2 min    Wrist wheel 2 ways 2 min    Wave  Hook  straight fist, composite fist finger spreads finger lifts  EDM isolated    X 10 reps each    Opposition  X 20    Light grippers X 10 each unable due to pain            Home Exercises and Education Provided     Education provided:   - HEP in place  - Progress towards goals     Written Home Exercises Provided: Patient instructed to cont prior HEP.  Exercises were reviewed and Jeannie was able to demonstrate them prior to the end of the session.  Jeannie demonstrated good  understanding of the HEP provided.   .   See EMR under Patient Instructions for exercises provided prior visit.        Assessment   Pt 5 weeks 5 days     Pt would continue to benefit from skilled OT.She was unable to tolerate session well today. Increased pain and sensitivity noted today. She states her pain up from a 3 to a 6/10 today in the elbow and wrist. She was able to increase her numbers with reassessment today with increased AROM but still having increased pain and guarding. She has concern for the elbow pain and her thumb specifically. She did fairly well with MT and STM with minor relief after session.  Possible desensitization kit education next visit and nerve glides. The elbow is doing well with minor stiffness and edema noted still.  Jeannie is progressing well towards her goals and there are no updates to goals at this time. Pt prognosis is Good.     Pt will continue to benefit from skilled outpatient occupational therapy to address the deficits listed in the problem list on initial evaluation provide pt/family education and to maximize pt's level of independence in the home and community environment.     Anticipated barriers to occupational therapy: multiple surgery sites and non operated hand affected also     Pt's spiritual, cultural and educational  needs considered and pt agreeable to plan of care and goals.    Goals:  Short term Goals:  1) Initiate HEP Progressing 11/24/2020  2) Pt will increase L wrist active extension and flexion by 10 degrees each in order to assist with self-care activities by 4 weeks. Progressing 12/14/2020  3) Pt will increase L elbow active extension and flexion by 5-10 degrees each for dressing and other self-care tasks by 4 weeks. Progressing 12/14/2020  4) Pt will reduce edema by at least 1 cm in L wrist by 4 weeks. Progressing 12/14/2020  5) Pt will reduce pain to less than 4/10 with self-care activities by 4 weeks. Progressing 12/14/2020  6) Patient will be able to achieve less than or equal to 60% on the FOTO, demonstrating overall improved functional ability with upper extremity. (Self-care category) Progressing 12/14/2020     Long Term Goals:  Goals to be met by discharge:  1) Independent with HEP Progressing 12/14/2020  2) Pt will increase HEBERT of L fingers (all) by 30-50 degrees in order to increase functional grasp with home management and work related tasks by d/c. Progressing 12/14/2020  3) Pt will decrease edema to trace or none to increase functional ROM by d/c. Progressing 12/14/2020  4) Pt will increase functional  strength of L hand by 5-10# in order to A in opening containers for home management tasks by 4 weeks. Progressing 12/14/2020  5) Pt will decrease pain to trace or none while completing light home management tasks and work related tasks by d/c. Progressing 12/14/2020  6) Patient will be able to achieve less than or equal to 44% on the FOTO, demonstrating overall improved functional ability with upper extremity.  (Self-care category) Progressing 12/14/2020      Plan   Continue per initial POC.   Updates/Grading for next session: Progress as tolerated.       Judd Aggarwal, OT

## 2020-12-17 ENCOUNTER — PATIENT MESSAGE (OUTPATIENT)
Dept: ORTHOPEDICS | Facility: CLINIC | Age: 55
End: 2020-12-17

## 2020-12-17 ENCOUNTER — CLINICAL SUPPORT (OUTPATIENT)
Dept: REHABILITATION | Facility: HOSPITAL | Age: 55
End: 2020-12-17
Payer: COMMERCIAL

## 2020-12-17 ENCOUNTER — OFFICE VISIT (OUTPATIENT)
Dept: ORTHOPEDICS | Facility: CLINIC | Age: 55
End: 2020-12-17
Payer: COMMERCIAL

## 2020-12-17 VITALS
DIASTOLIC BLOOD PRESSURE: 86 MMHG | HEIGHT: 60 IN | SYSTOLIC BLOOD PRESSURE: 127 MMHG | BODY MASS INDEX: 33.96 KG/M2 | HEART RATE: 85 BPM | WEIGHT: 173 LBS

## 2020-12-17 DIAGNOSIS — M25.531 PAIN OF BOTH WRIST JOINTS: ICD-10-CM

## 2020-12-17 DIAGNOSIS — G89.18 POST-OPERATIVE PAIN: Primary | ICD-10-CM

## 2020-12-17 DIAGNOSIS — M25.60 STIFFNESS IN JOINT: ICD-10-CM

## 2020-12-17 DIAGNOSIS — M62.81 MUSCLE WEAKNESS: ICD-10-CM

## 2020-12-17 DIAGNOSIS — M25.532 PAIN OF BOTH WRIST JOINTS: ICD-10-CM

## 2020-12-17 PROCEDURE — 97140 MANUAL THERAPY 1/> REGIONS: CPT | Mod: PN

## 2020-12-17 PROCEDURE — 1125F AMNT PAIN NOTED PAIN PRSNT: CPT | Mod: S$GLB,,, | Performed by: PHYSICIAN ASSISTANT

## 2020-12-17 PROCEDURE — 99999 PR PBB SHADOW E&M-EST. PATIENT-LVL III: CPT | Mod: PBBFAC,,, | Performed by: PHYSICIAN ASSISTANT

## 2020-12-17 PROCEDURE — 99024 PR POST-OP FOLLOW-UP VISIT: ICD-10-PCS | Mod: S$GLB,,, | Performed by: PHYSICIAN ASSISTANT

## 2020-12-17 PROCEDURE — 97018 PARAFFIN BATH THERAPY: CPT | Mod: PN

## 2020-12-17 PROCEDURE — 99999 PR PBB SHADOW E&M-EST. PATIENT-LVL III: ICD-10-PCS | Mod: PBBFAC,,, | Performed by: PHYSICIAN ASSISTANT

## 2020-12-17 PROCEDURE — 99024 POSTOP FOLLOW-UP VISIT: CPT | Mod: S$GLB,,, | Performed by: PHYSICIAN ASSISTANT

## 2020-12-17 PROCEDURE — 97110 THERAPEUTIC EXERCISES: CPT | Mod: PN

## 2020-12-17 PROCEDURE — 3008F BODY MASS INDEX DOCD: CPT | Mod: CPTII,S$GLB,, | Performed by: PHYSICIAN ASSISTANT

## 2020-12-17 PROCEDURE — 3008F PR BODY MASS INDEX (BMI) DOCUMENTED: ICD-10-PCS | Mod: CPTII,S$GLB,, | Performed by: PHYSICIAN ASSISTANT

## 2020-12-17 PROCEDURE — 1125F PR PAIN SEVERITY QUANTIFIED, PAIN PRESENT: ICD-10-PCS | Mod: S$GLB,,, | Performed by: PHYSICIAN ASSISTANT

## 2020-12-17 RX ORDER — IBUPROFEN 600 MG/1
600 TABLET ORAL EVERY 6 HOURS PRN
Qty: 120 TABLET | Refills: 0 | Status: SHIPPED | OUTPATIENT
Start: 2020-12-17 | End: 2021-02-17

## 2020-12-17 NOTE — PROGRESS NOTES
Ms. Saavedra is here today for a post-operative visit.  She is 43 days status post left carpal tunnel release with left ulnar nerve decompression at the elbow and right carpal tunnel injection by Dr. Muniz on 11/4/2020. She presents today for follow up of scar pain.  She has started OT, she is performing HEP.  She reports pain with elbow and wrist motion.  She is taking pain medication as needed.  She denies fever, chills, and sweats since the time of the surgery.     Physical exam:    Vitals:    12/17/20 1322   BP: 127/86   Pulse: 85   Weight: 78.5 kg (173 lb)   Height: 5' (1.524 m)   PainSc:   7     Vital signs are stable, patient is afebrile.  Patient is well dressed and well groomed, no acute distress.  Alert and oriented to person, place, and time.  Incisions are healing well - clean, dry, and intact.  Palm incision healing well, elbow incision well healed.  Incisions are tender to palpation.  There is no erythema or exudate.  There is no sign of any infection. She is NVI.  Good finger and wrist range of motion, good elbow range of motion.    Assessment: 43 days status post left carpal tunnel release with left ulnar nerve decompression at the elbow and right carpal tunnel injection    Plan:  Jeannie was seen today for pain.    Diagnoses and all orders for this visit:    Post-operative pain  -     ibuprofen (ADVIL,MOTRIN) 600 MG tablet; Take 1 tablet (600 mg total) by mouth every 6 (six) hours as needed for Pain.        - Regular OT and HEP  - discussed nerve healing time line  - Discussed regular scar massage, discussed scar sensitivity and pillar pain  - gradually increase lifting and strengthening in therapy  - Follow up in 4-6 weeks for recheck  - Call with questions or concerns

## 2020-12-17 NOTE — PROGRESS NOTES
"    Occupational Therapy Daily Treatment Note     Name: Jeannie Saavedra  Clinic Number: [id number]    Therapy Diagnosis:   Encounter Diagnoses   Name Primary?    Stiffness in joint     Pain of both wrist joints     Muscle weakness      Physician: Marilyn Crowe PA-C    Visit Date: 12/17/2020  Physician Orders: Eval and Tx   Note: s/p L CTR with L ulnar nerve decompression at the elbow and R CT injection. Eval and treat, modalities as needed. 8+ visits     Medical Diagnosis:   G56.03 (ICD-10-CM) - Bilateral carpal tunnel syndrome   G56.22 (ICD-10-CM) - Ulnar neuropathy of left upper extremity      Surgical Procedure and Date: 11/4/2020  -RELEASE, CARPAL TUNNEL-Left (Left)  -DECOMPRESSION, NERVE, ULNAR-left elbow (Left)  -INJECTION, STEROID-right carpal tunnel (Right)    Date of Injury/Onset: gradual onset, unknown date  Evaluation Date: 11/24/2020  Insurance Authorization Period Expiration: 11/19/2021  Plan of Care Certification Period: 1/22/2021  Date of Return to MD: 1/29/2021    Visit # / Visits authorized: 4/24      FOTO: initial eval     Time In: 3:15 pm   Time Out: 4:00 pm   Total treatment time: 45 minutes  Total Timed minutes: 45 minutes     Precautions:  Standard and Weightbearing    Subjective     Pt reports: "I just saw the doctor she said to keep massaging that scar-that's what is bothering me the most."  she was compliant with home exercise program given last session.   Response to previous treatment: none noted  Functional change: none noted yet    Pain: 6/10  Location: left elbow  and hands      Objective     Jeannie received the following supervised modalities after being cleared for contradictions for 10 minutes:   -Paraffin to L hand w/ MHP to L elbow for 10 min, pre-tx to decrease pain & increase tissue extensibility    Jeannie received the following manual therapy techniques for 10 minutes:   -Pt received retrograde massage as well as scar massage to decrease edema and stiffness for increased ROM at " wrist and elbow. Small cup to incisions to increase pliability and to increase blood flow to area    Jeannie received therapeutic exercises for 25 minutes including:  -  Golf ball scar massage  2 min    Wrist AROM  Flx/ext  RD/UD 2 X 15 each way   Elbow AROM  Flx/ext   3 ways  2 X 10      Isospheres 2 min    Wrist wheel 2 ways 2 min    Wave  Hook  straight fist, composite fist finger spreads finger lifts  EDM isolated    X 10 reps each    Opposition  X 20    Light grippers X 10 each            Home Exercises and Education Provided     Education provided:   - HEP in place  - Progress towards goals     Written Home Exercises Provided: Patient instructed to cont prior HEP.  Exercises were reviewed and Jeannie was able to demonstrate them prior to the end of the session.  Jeannie demonstrated good  understanding of the HEP provided.   .   See EMR under Patient Instructions for exercises provided prior visit.      Assessment   Pt 6 weeks 1 day    Pt with fair participation today. Her pain report remains high. Pt with dense scar tissue noted at CTR and cubital tunnel. Responded well to STM and cupping. May benefit from IASTM when able to tolerate. Pt most limited by weakness and scar tissue pain.  Jeannie is progressing well towards her goals and there are no updates to goals at this time. Pt prognosis is Good.     Pt will continue to benefit from skilled outpatient occupational therapy to address the deficits listed in the problem list on initial evaluation provide pt/family education and to maximize pt's level of independence in the home and community environment.     Anticipated barriers to occupational therapy: multiple surgery sites and non operated hand affected also     Pt's spiritual, cultural and educational needs considered and pt agreeable to plan of care and goals.    Goals:  Short term Goals:  1) Initiate HEP met  2) Pt will increase L wrist active extension and flexion by 10 degrees each in order to assist with  self-care activities by 4 weeks. Progressing 12/17/2020  3) Pt will increase L elbow active extension and flexion by 5-10 degrees each for dressing and other self-care tasks by 4 weeks. Progressing 12/17/2020  4) Pt will reduce edema by at least 1 cm in L wrist by 4 weeks. Progressing 12/17/2020  5) Pt will reduce pain to less than 4/10 with self-care activities by 4 weeks. Progressing 12/17/2020  6) Patient will be able to achieve less than or equal to 60% on the FOTO, demonstrating overall improved functional ability with upper extremity. (Self-care category) Progressing 12/17/2020     Long Term Goals:  Goals to be met by discharge:  1) Independent with HEP Progressing 12/17/2020  2) Pt will increase HEBERT of L fingers (all) by 30-50 degrees in order to increase functional grasp with home management and work related tasks by d/c. Progressing 12/17/2020  3) Pt will decrease edema to trace or none to increase functional ROM by d/c. Progressing 12/17/2020  4) Pt will increase functional  strength of L hand by 5-10# in order to A in opening containers for home management tasks by 4 weeks. Progressing 12/17/2020  5) Pt will decrease pain to trace or none while completing light home management tasks and work related tasks by d/c. Progressing 12/17/2020  6) Patient will be able to achieve less than or equal to 44% on the FOTO, demonstrating overall improved functional ability with upper extremity.  (Self-care category) Progressing 12/17/2020    Plan   Continue per initial POC.   Updates/Grading for next session: Progress as tolerated.       MATTIE Miller

## 2021-01-04 ENCOUNTER — PATIENT MESSAGE (OUTPATIENT)
Dept: ADMINISTRATIVE | Facility: HOSPITAL | Age: 56
End: 2021-01-04

## 2021-01-16 ENCOUNTER — CLINICAL SUPPORT (OUTPATIENT)
Dept: URGENT CARE | Facility: CLINIC | Age: 56
End: 2021-01-16
Payer: COMMERCIAL

## 2021-01-16 VITALS — TEMPERATURE: 98 F

## 2021-01-16 DIAGNOSIS — Z01.812 ENCOUNTER FOR SCREENING LABORATORY TESTING FOR COVID-19 VIRUS IN ASYMPTOMATIC PATIENT: Primary | ICD-10-CM

## 2021-01-16 DIAGNOSIS — Z11.52 ENCOUNTER FOR SCREENING LABORATORY TESTING FOR COVID-19 VIRUS IN ASYMPTOMATIC PATIENT: Primary | ICD-10-CM

## 2021-01-16 LAB
CTP QC/QA: YES
SARS-COV-2 RDRP RESP QL NAA+PROBE: NEGATIVE

## 2021-01-16 PROCEDURE — U0002: ICD-10-PCS | Mod: QW,S$GLB,, | Performed by: NURSE PRACTITIONER

## 2021-01-16 PROCEDURE — U0002 COVID-19 LAB TEST NON-CDC: HCPCS | Mod: QW,S$GLB,, | Performed by: NURSE PRACTITIONER

## 2021-02-01 ENCOUNTER — PATIENT MESSAGE (OUTPATIENT)
Dept: INTERNAL MEDICINE | Facility: CLINIC | Age: 56
End: 2021-02-01

## 2021-02-03 ENCOUNTER — PATIENT OUTREACH (OUTPATIENT)
Dept: ADMINISTRATIVE | Facility: HOSPITAL | Age: 56
End: 2021-02-03

## 2021-02-15 NOTE — PROGRESS NOTES
Pt is s/p left carpal tunnel release, left cubital tunnel release 11/4/20 by Dr. Muniz. Her splint is tight.     Splint removed. Incisions clean dry intact. Good finger motion.     New left long arm plaster splint applied   OT ordered, begin after 2 wk PO  RTC for 2 wk PO      yes

## 2021-02-17 ENCOUNTER — PATIENT MESSAGE (OUTPATIENT)
Dept: INTERNAL MEDICINE | Facility: CLINIC | Age: 56
End: 2021-02-17

## 2021-02-17 ENCOUNTER — OFFICE VISIT (OUTPATIENT)
Dept: INTERNAL MEDICINE | Facility: CLINIC | Age: 56
End: 2021-02-17
Payer: COMMERCIAL

## 2021-02-17 VITALS
WEIGHT: 180.88 LBS | HEIGHT: 60 IN | TEMPERATURE: 98 F | OXYGEN SATURATION: 99 % | SYSTOLIC BLOOD PRESSURE: 118 MMHG | RESPIRATION RATE: 18 BRPM | HEART RATE: 84 BPM | DIASTOLIC BLOOD PRESSURE: 74 MMHG | BODY MASS INDEX: 35.51 KG/M2

## 2021-02-17 DIAGNOSIS — G89.29 CHRONIC NECK PAIN: ICD-10-CM

## 2021-02-17 DIAGNOSIS — M47.26 OSTEOARTHRITIS OF SPINE WITH RADICULOPATHY, LUMBAR REGION: ICD-10-CM

## 2021-02-17 DIAGNOSIS — M54.2 CHRONIC NECK PAIN: ICD-10-CM

## 2021-02-17 DIAGNOSIS — M25.529 ELBOW PAIN, UNSPECIFIED LATERALITY: Primary | ICD-10-CM

## 2021-02-17 DIAGNOSIS — M47.816 OSTEOARTHRITIS OF LUMBAR SPINE, UNSPECIFIED SPINAL OSTEOARTHRITIS COMPLICATION STATUS: ICD-10-CM

## 2021-02-17 PROCEDURE — 1125F AMNT PAIN NOTED PAIN PRSNT: CPT | Mod: S$GLB,,, | Performed by: INTERNAL MEDICINE

## 2021-02-17 PROCEDURE — 99999 PR PBB SHADOW E&M-EST. PATIENT-LVL V: CPT | Mod: PBBFAC,,, | Performed by: INTERNAL MEDICINE

## 2021-02-17 PROCEDURE — 3008F BODY MASS INDEX DOCD: CPT | Mod: CPTII,S$GLB,, | Performed by: INTERNAL MEDICINE

## 2021-02-17 PROCEDURE — 1125F PR PAIN SEVERITY QUANTIFIED, PAIN PRESENT: ICD-10-PCS | Mod: S$GLB,,, | Performed by: INTERNAL MEDICINE

## 2021-02-17 PROCEDURE — 3008F PR BODY MASS INDEX (BMI) DOCUMENTED: ICD-10-PCS | Mod: CPTII,S$GLB,, | Performed by: INTERNAL MEDICINE

## 2021-02-17 PROCEDURE — 99215 PR OFFICE/OUTPT VISIT, EST, LEVL V, 40-54 MIN: ICD-10-PCS | Mod: S$GLB,,, | Performed by: INTERNAL MEDICINE

## 2021-02-17 PROCEDURE — 99999 PR PBB SHADOW E&M-EST. PATIENT-LVL V: ICD-10-PCS | Mod: PBBFAC,,, | Performed by: INTERNAL MEDICINE

## 2021-02-17 PROCEDURE — 99215 OFFICE O/P EST HI 40 MIN: CPT | Mod: S$GLB,,, | Performed by: INTERNAL MEDICINE

## 2021-02-17 RX ORDER — GABAPENTIN 300 MG/1
CAPSULE ORAL
Qty: 90 CAPSULE | Refills: 3 | Status: SHIPPED | OUTPATIENT
Start: 2021-02-17 | End: 2021-02-24

## 2021-02-17 RX ORDER — HYDROCODONE BITARTRATE AND ACETAMINOPHEN 5; 325 MG/1; MG/1
TABLET ORAL
COMMUNITY
Start: 2021-01-15 | End: 2021-04-28

## 2021-02-17 RX ORDER — TIZANIDINE 4 MG/1
TABLET ORAL
COMMUNITY
Start: 2021-01-04 | End: 2021-04-28

## 2021-02-17 RX ORDER — DICLOFENAC SODIUM 75 MG/1
TABLET, DELAYED RELEASE ORAL
COMMUNITY
Start: 2021-01-04 | End: 2021-03-16 | Stop reason: SDUPTHER

## 2021-02-18 ENCOUNTER — TELEPHONE (OUTPATIENT)
Dept: INTERNAL MEDICINE | Facility: CLINIC | Age: 56
End: 2021-02-18

## 2021-02-24 ENCOUNTER — PATIENT MESSAGE (OUTPATIENT)
Dept: INTERNAL MEDICINE | Facility: CLINIC | Age: 56
End: 2021-02-24

## 2021-02-24 RX ORDER — PREGABALIN 50 MG/1
CAPSULE ORAL
Qty: 90 CAPSULE | Refills: 2 | Status: SHIPPED | OUTPATIENT
Start: 2021-02-24 | End: 2021-04-28

## 2021-02-25 ENCOUNTER — PATIENT MESSAGE (OUTPATIENT)
Dept: INTERNAL MEDICINE | Facility: CLINIC | Age: 56
End: 2021-02-25

## 2021-02-25 RX ORDER — LORAZEPAM 0.5 MG/1
0.5 TABLET ORAL NIGHTLY
Qty: 30 TABLET | Refills: 0 | Status: SHIPPED | OUTPATIENT
Start: 2021-02-25 | End: 2021-04-28

## 2021-03-16 ENCOUNTER — PATIENT MESSAGE (OUTPATIENT)
Dept: INTERNAL MEDICINE | Facility: CLINIC | Age: 56
End: 2021-03-16

## 2021-03-16 RX ORDER — DICLOFENAC SODIUM 75 MG/1
75 TABLET, DELAYED RELEASE ORAL 2 TIMES DAILY
Qty: 60 TABLET | Refills: 5 | Status: SHIPPED | OUTPATIENT
Start: 2021-03-16 | End: 2022-07-06

## 2021-04-05 ENCOUNTER — PATIENT MESSAGE (OUTPATIENT)
Dept: INTERNAL MEDICINE | Facility: CLINIC | Age: 56
End: 2021-04-05

## 2021-04-05 ENCOUNTER — PATIENT MESSAGE (OUTPATIENT)
Dept: ADMINISTRATIVE | Facility: HOSPITAL | Age: 56
End: 2021-04-05

## 2021-04-05 DIAGNOSIS — Z12.39 BREAST SCREENING: Primary | ICD-10-CM

## 2021-04-06 ENCOUNTER — PATIENT MESSAGE (OUTPATIENT)
Dept: INTERNAL MEDICINE | Facility: CLINIC | Age: 56
End: 2021-04-06

## 2021-04-08 ENCOUNTER — PATIENT MESSAGE (OUTPATIENT)
Dept: INTERNAL MEDICINE | Facility: CLINIC | Age: 56
End: 2021-04-08

## 2021-04-09 RX ORDER — CYPROHEPTADINE HYDROCHLORIDE 4 MG/1
4 TABLET ORAL 2 TIMES DAILY PRN
Qty: 180 TABLET | Refills: 3 | Status: SHIPPED | OUTPATIENT
Start: 2021-04-09 | End: 2022-05-05

## 2021-04-10 ENCOUNTER — PATIENT MESSAGE (OUTPATIENT)
Dept: INTERNAL MEDICINE | Facility: CLINIC | Age: 56
End: 2021-04-10

## 2021-04-27 ENCOUNTER — PATIENT MESSAGE (OUTPATIENT)
Dept: INTERNAL MEDICINE | Facility: CLINIC | Age: 56
End: 2021-04-27

## 2021-04-27 ENCOUNTER — TELEPHONE (OUTPATIENT)
Dept: INTERNAL MEDICINE | Facility: CLINIC | Age: 56
End: 2021-04-27

## 2021-04-27 DIAGNOSIS — R07.9 CHEST PAIN, UNSPECIFIED TYPE: Primary | ICD-10-CM

## 2021-04-28 ENCOUNTER — OFFICE VISIT (OUTPATIENT)
Dept: INTERNAL MEDICINE | Facility: CLINIC | Age: 56
End: 2021-04-28
Payer: COMMERCIAL

## 2021-04-28 ENCOUNTER — HOSPITAL ENCOUNTER (OUTPATIENT)
Dept: CARDIOLOGY | Facility: CLINIC | Age: 56
Discharge: HOME | End: 2021-04-28
Payer: COMMERCIAL

## 2021-04-28 VITALS
BODY MASS INDEX: 34.16 KG/M2 | SYSTOLIC BLOOD PRESSURE: 110 MMHG | HEIGHT: 60 IN | DIASTOLIC BLOOD PRESSURE: 76 MMHG | HEART RATE: 83 BPM | WEIGHT: 174 LBS

## 2021-04-28 DIAGNOSIS — R07.9 CHEST PAIN, UNSPECIFIED TYPE: ICD-10-CM

## 2021-04-28 DIAGNOSIS — F43.9 SITUATIONAL STRESS: ICD-10-CM

## 2021-04-28 DIAGNOSIS — R07.9 CHEST PAIN, UNSPECIFIED TYPE: Primary | ICD-10-CM

## 2021-04-28 DIAGNOSIS — Z82.49 FAMILY HISTORY OF HYPERTROPHIC CARDIOMYOPATHY: ICD-10-CM

## 2021-04-28 PROCEDURE — 99214 PR OFFICE/OUTPT VISIT, EST, LEVL IV, 30-39 MIN: ICD-10-PCS | Mod: S$GLB,,, | Performed by: INTERNAL MEDICINE

## 2021-04-28 PROCEDURE — 93010 ELECTROCARDIOGRAM REPORT: CPT | Mod: S$GLB,,, | Performed by: INTERNAL MEDICINE

## 2021-04-28 PROCEDURE — 93005 EKG 12-LEAD: ICD-10-PCS | Mod: S$GLB,,, | Performed by: INTERNAL MEDICINE

## 2021-04-28 PROCEDURE — 93005 ELECTROCARDIOGRAM TRACING: CPT | Mod: S$GLB,,, | Performed by: INTERNAL MEDICINE

## 2021-04-28 PROCEDURE — 99999 PR PBB SHADOW E&M-EST. PATIENT-LVL III: ICD-10-PCS | Mod: PBBFAC,,, | Performed by: INTERNAL MEDICINE

## 2021-04-28 PROCEDURE — 1126F PR PAIN SEVERITY QUANTIFIED, NO PAIN PRESENT: ICD-10-PCS | Mod: S$GLB,,, | Performed by: INTERNAL MEDICINE

## 2021-04-28 PROCEDURE — 99214 OFFICE O/P EST MOD 30 MIN: CPT | Mod: S$GLB,,, | Performed by: INTERNAL MEDICINE

## 2021-04-28 PROCEDURE — 1126F AMNT PAIN NOTED NONE PRSNT: CPT | Mod: S$GLB,,, | Performed by: INTERNAL MEDICINE

## 2021-04-28 PROCEDURE — 93010 EKG 12-LEAD: ICD-10-PCS | Mod: S$GLB,,, | Performed by: INTERNAL MEDICINE

## 2021-04-28 PROCEDURE — 99999 PR PBB SHADOW E&M-EST. PATIENT-LVL III: CPT | Mod: PBBFAC,,, | Performed by: INTERNAL MEDICINE

## 2021-04-28 PROCEDURE — 3008F BODY MASS INDEX DOCD: CPT | Mod: CPTII,S$GLB,, | Performed by: INTERNAL MEDICINE

## 2021-04-28 PROCEDURE — 3008F PR BODY MASS INDEX (BMI) DOCUMENTED: ICD-10-PCS | Mod: CPTII,S$GLB,, | Performed by: INTERNAL MEDICINE

## 2021-04-28 RX ORDER — AMMONIUM LACTATE 12 G/100G
CREAM TOPICAL
COMMUNITY
Start: 2021-04-05 | End: 2021-08-03

## 2021-04-28 RX ORDER — IBUPROFEN 800 MG/1
TABLET ORAL
COMMUNITY
Start: 2021-02-27 | End: 2021-08-03

## 2021-04-28 RX ORDER — TRETINOIN 0.25 MG/G
CREAM TOPICAL
COMMUNITY
Start: 2021-04-05 | End: 2021-08-03

## 2021-04-28 RX ORDER — METHYLPREDNISOLONE 4 MG/1
TABLET ORAL
COMMUNITY
Start: 2021-02-27 | End: 2021-08-03

## 2021-04-28 RX ORDER — GABAPENTIN 300 MG/1
CAPSULE ORAL
COMMUNITY
Start: 2021-03-12 | End: 2021-08-03

## 2021-04-29 ENCOUNTER — PATIENT MESSAGE (OUTPATIENT)
Dept: INTERNAL MEDICINE | Facility: CLINIC | Age: 56
End: 2021-04-29

## 2021-04-29 DIAGNOSIS — I45.10 INCOMPLETE RBBB: ICD-10-CM

## 2021-04-29 DIAGNOSIS — R07.89 ATYPICAL CHEST PAIN: Primary | ICD-10-CM

## 2021-05-04 ENCOUNTER — PATIENT MESSAGE (OUTPATIENT)
Dept: INTERNAL MEDICINE | Facility: CLINIC | Age: 56
End: 2021-05-04

## 2021-05-06 ENCOUNTER — PATIENT MESSAGE (OUTPATIENT)
Dept: INTERNAL MEDICINE | Facility: CLINIC | Age: 56
End: 2021-05-06

## 2021-05-07 ENCOUNTER — PATIENT MESSAGE (OUTPATIENT)
Dept: INTERNAL MEDICINE | Facility: CLINIC | Age: 56
End: 2021-05-07

## 2021-05-08 ENCOUNTER — PATIENT MESSAGE (OUTPATIENT)
Dept: INTERNAL MEDICINE | Facility: CLINIC | Age: 56
End: 2021-05-08

## 2021-05-10 ENCOUNTER — PATIENT OUTREACH (OUTPATIENT)
Dept: ADMINISTRATIVE | Facility: OTHER | Age: 56
End: 2021-05-10

## 2021-05-10 ENCOUNTER — OFFICE VISIT (OUTPATIENT)
Dept: CARDIOLOGY | Facility: CLINIC | Age: 56
End: 2021-05-10
Payer: COMMERCIAL

## 2021-05-10 VITALS
OXYGEN SATURATION: 99 % | SYSTOLIC BLOOD PRESSURE: 121 MMHG | WEIGHT: 177.69 LBS | DIASTOLIC BLOOD PRESSURE: 81 MMHG | HEART RATE: 91 BPM | HEIGHT: 60 IN | BODY MASS INDEX: 34.89 KG/M2

## 2021-05-10 DIAGNOSIS — I45.10 INCOMPLETE RBBB: ICD-10-CM

## 2021-05-10 DIAGNOSIS — R07.9 CHEST PAIN OF UNKNOWN ETIOLOGY: ICD-10-CM

## 2021-05-10 PROCEDURE — 99999 PR PBB SHADOW E&M-EST. PATIENT-LVL V: CPT | Mod: PBBFAC,,, | Performed by: INTERNAL MEDICINE

## 2021-05-10 PROCEDURE — 99999 PR PBB SHADOW E&M-EST. PATIENT-LVL V: ICD-10-PCS | Mod: PBBFAC,,, | Performed by: INTERNAL MEDICINE

## 2021-05-10 PROCEDURE — 1126F PR PAIN SEVERITY QUANTIFIED, NO PAIN PRESENT: ICD-10-PCS | Mod: S$GLB,,, | Performed by: INTERNAL MEDICINE

## 2021-05-10 PROCEDURE — 99204 PR OFFICE/OUTPT VISIT, NEW, LEVL IV, 45-59 MIN: ICD-10-PCS | Mod: S$GLB,,, | Performed by: INTERNAL MEDICINE

## 2021-05-10 PROCEDURE — 99204 OFFICE O/P NEW MOD 45 MIN: CPT | Mod: S$GLB,,, | Performed by: INTERNAL MEDICINE

## 2021-05-10 PROCEDURE — 1126F AMNT PAIN NOTED NONE PRSNT: CPT | Mod: S$GLB,,, | Performed by: INTERNAL MEDICINE

## 2021-05-10 PROCEDURE — 3008F BODY MASS INDEX DOCD: CPT | Mod: CPTII,S$GLB,, | Performed by: INTERNAL MEDICINE

## 2021-05-10 PROCEDURE — 3008F PR BODY MASS INDEX (BMI) DOCUMENTED: ICD-10-PCS | Mod: CPTII,S$GLB,, | Performed by: INTERNAL MEDICINE

## 2021-05-10 RX ORDER — BUTALBITAL, ACETAMINOPHEN AND CAFFEINE 50; 325; 40 MG/1; MG/1; MG/1
TABLET ORAL
Qty: 30 TABLET | Refills: 0 | Status: SHIPPED | OUTPATIENT
Start: 2021-05-10 | End: 2021-11-09 | Stop reason: SDUPTHER

## 2021-06-08 ENCOUNTER — PATIENT MESSAGE (OUTPATIENT)
Dept: INTERNAL MEDICINE | Facility: CLINIC | Age: 56
End: 2021-06-08

## 2021-06-09 ENCOUNTER — PATIENT MESSAGE (OUTPATIENT)
Dept: INTERNAL MEDICINE | Facility: CLINIC | Age: 56
End: 2021-06-09

## 2021-06-10 ENCOUNTER — PATIENT MESSAGE (OUTPATIENT)
Dept: INTERNAL MEDICINE | Facility: CLINIC | Age: 56
End: 2021-06-10

## 2021-06-11 ENCOUNTER — OCCUPATIONAL HEALTH (OUTPATIENT)
Dept: URGENT CARE | Facility: CLINIC | Age: 56
End: 2021-06-11

## 2021-06-11 DIAGNOSIS — Z02.89 ENCOUNTER FOR EXAMINATION REQUIRED BY DEPARTMENT OF TRANSPORTATION (DOT): Primary | ICD-10-CM

## 2021-06-11 PROCEDURE — 99499 UNLISTED E&M SERVICE: CPT | Mod: S$GLB,,, | Performed by: NURSE PRACTITIONER

## 2021-06-11 PROCEDURE — 99499 PHYSICAL, RECERT DOT/CDL: ICD-10-PCS | Mod: S$GLB,,, | Performed by: NURSE PRACTITIONER

## 2021-06-14 ENCOUNTER — TELEPHONE (OUTPATIENT)
Dept: INTERNAL MEDICINE | Facility: CLINIC | Age: 56
End: 2021-06-14

## 2021-06-15 ENCOUNTER — PATIENT OUTREACH (OUTPATIENT)
Dept: ADMINISTRATIVE | Facility: HOSPITAL | Age: 56
End: 2021-06-15

## 2021-07-21 RX ORDER — DICLOFENAC SODIUM 10 MG/G
2 GEL TOPICAL 4 TIMES DAILY
Qty: 100 G | Refills: 0 | Status: SHIPPED | OUTPATIENT
Start: 2021-07-21 | End: 2022-07-06

## 2021-08-02 ENCOUNTER — PATIENT MESSAGE (OUTPATIENT)
Dept: INTERNAL MEDICINE | Facility: CLINIC | Age: 56
End: 2021-08-02

## 2021-08-03 ENCOUNTER — PATIENT MESSAGE (OUTPATIENT)
Dept: INTERNAL MEDICINE | Facility: CLINIC | Age: 56
End: 2021-08-03

## 2021-08-03 RX ORDER — HYDROCORTISONE 25 MG/G
CREAM TOPICAL 2 TIMES DAILY
Qty: 1 TUBE | Refills: 1 | Status: SHIPPED | OUTPATIENT
Start: 2021-08-03 | End: 2022-07-06

## 2021-08-12 ENCOUNTER — TELEPHONE (OUTPATIENT)
Dept: INTERNAL MEDICINE | Facility: CLINIC | Age: 56
End: 2021-08-12

## 2021-08-12 ENCOUNTER — PATIENT MESSAGE (OUTPATIENT)
Dept: INTERNAL MEDICINE | Facility: CLINIC | Age: 56
End: 2021-08-12

## 2021-08-19 ENCOUNTER — TELEPHONE (OUTPATIENT)
Dept: INTERNAL MEDICINE | Facility: CLINIC | Age: 56
End: 2021-08-19

## 2021-08-19 ENCOUNTER — PATIENT MESSAGE (OUTPATIENT)
Dept: INTERNAL MEDICINE | Facility: CLINIC | Age: 56
End: 2021-08-19

## 2021-08-19 ENCOUNTER — OFFICE VISIT (OUTPATIENT)
Dept: INTERNAL MEDICINE | Facility: CLINIC | Age: 56
End: 2021-08-19
Payer: COMMERCIAL

## 2021-08-19 DIAGNOSIS — F43.9 SITUATIONAL STRESS: ICD-10-CM

## 2021-08-19 DIAGNOSIS — G47.00 INSOMNIA, UNSPECIFIED TYPE: ICD-10-CM

## 2021-08-19 DIAGNOSIS — N95.1 MENOPAUSAL SYMPTOMS: Primary | ICD-10-CM

## 2021-08-19 PROCEDURE — 99213 OFFICE O/P EST LOW 20 MIN: CPT | Mod: 95,,, | Performed by: INTERNAL MEDICINE

## 2021-08-19 PROCEDURE — 99213 PR OFFICE/OUTPT VISIT, EST, LEVL III, 20-29 MIN: ICD-10-PCS | Mod: 95,,, | Performed by: INTERNAL MEDICINE

## 2021-08-19 RX ORDER — TEMAZEPAM 15 MG/1
15 CAPSULE ORAL NIGHTLY PRN
Qty: 30 CAPSULE | Refills: 0 | Status: SHIPPED | OUTPATIENT
Start: 2021-08-19 | End: 2021-09-18

## 2021-08-19 RX ORDER — ESTRADIOL 0.5 MG/1
TABLET ORAL
Qty: 30 TABLET | Refills: 5 | Status: SHIPPED | OUTPATIENT
Start: 2021-08-19 | End: 2021-11-09 | Stop reason: SDUPTHER

## 2021-08-20 RX ORDER — ESCITALOPRAM OXALATE 10 MG/1
10 TABLET ORAL DAILY
Qty: 30 TABLET | Refills: 2 | Status: SHIPPED | OUTPATIENT
Start: 2021-08-20 | End: 2022-07-06

## 2021-10-14 NOTE — TELEPHONE ENCOUNTER
Called patient regarding medication refill and to touch base regarding the swelling reported yesterday. Patient stated that the swelling has gone down, and it is not a concern at this time. Advised patient to come in for a splint change and ice where the splint ends/begins to alleviate some discomfort. Patient informed that medication would be refilled and sent to the pharmacy on [facility]. Patient verbalized understanding.   highly unsteady gait, max manual assist to maintain upright posture as patient maintains crouched position; significantly increased time required with turning; worsening crouch position with progression of gait training/decreased letty/decreased velocity of limb motion/decreased step length/decreased weight-shifting ability

## 2021-11-09 RX ORDER — ESTRADIOL 0.5 MG/1
TABLET ORAL
Qty: 30 TABLET | Refills: 5 | Status: SHIPPED | OUTPATIENT
Start: 2021-11-09 | End: 2022-01-19 | Stop reason: SDUPTHER

## 2021-11-09 RX ORDER — BUTALBITAL, ACETAMINOPHEN AND CAFFEINE 50; 325; 40 MG/1; MG/1; MG/1
TABLET ORAL
Qty: 30 TABLET | Refills: 0 | Status: SHIPPED | OUTPATIENT
Start: 2021-11-09 | End: 2022-05-03 | Stop reason: SDUPTHER

## 2021-11-17 ENCOUNTER — PATIENT MESSAGE (OUTPATIENT)
Dept: INTERNAL MEDICINE | Facility: CLINIC | Age: 56
End: 2021-11-17
Payer: COMMERCIAL

## 2021-11-17 RX ORDER — METHOCARBAMOL 500 MG/1
TABLET, FILM COATED ORAL
Qty: 40 TABLET | Refills: 0 | Status: SHIPPED | OUTPATIENT
Start: 2021-11-17 | End: 2022-01-19 | Stop reason: SDUPTHER

## 2021-11-24 NOTE — TELEPHONE ENCOUNTER
Informed patient that she can come in for a dressing change tomorrow. Patient stated that she may not be able to come tomorrow due to transportation issues. In the meantime, patient informed that the bandage can be slightly loosened but not removed. Advised patient to elevate and ice the extremity. Patient verbalized understanding.    Patient states that before taking pain medication pain is 10/10 and 8/10 after taking it. She is taking one tablet every 6 hours. She is taking ibuprofen or Goody's in between each dose of prescribed pain medication. Patient is requesting a refill of Norco. Please advise.   Never smoker

## 2021-12-01 ENCOUNTER — CLINICAL SUPPORT (OUTPATIENT)
Dept: OTHER | Facility: CLINIC | Age: 56
End: 2021-12-01
Payer: COMMERCIAL

## 2021-12-01 DIAGNOSIS — Z00.8 ENCOUNTER FOR OTHER GENERAL EXAMINATION: ICD-10-CM

## 2021-12-03 VITALS — HEIGHT: 60 IN | BODY MASS INDEX: 34.7 KG/M2

## 2021-12-03 LAB
HDLC SERPL-MCNC: 83 MG/DL
POC CHOLESTEROL, LDL (DOCK): 109 MG/DL
POC CHOLESTEROL, TOTAL: 205 MG/DL
POC GLUCOSE, FASTING: 93 MG/DL (ref 60–110)
TRIGL SERPL-MCNC: 68 MG/DL

## 2022-01-19 NOTE — TELEPHONE ENCOUNTER
No new care gaps identified.  Powered by [employer] by Heidi Shaulis. Reference number: [id number].   1/19/2022 2:25:46 PM CST

## 2022-01-20 RX ORDER — METHOCARBAMOL 500 MG/1
TABLET, FILM COATED ORAL
Qty: 40 TABLET | Refills: 0 | Status: SHIPPED | OUTPATIENT
Start: 2022-01-20 | End: 2022-07-08

## 2022-01-20 RX ORDER — ESTRADIOL 0.5 MG/1
TABLET ORAL
Qty: 30 TABLET | Refills: 5 | Status: SHIPPED | OUTPATIENT
Start: 2022-01-20 | End: 2022-02-01 | Stop reason: SDUPTHER

## 2022-02-01 ENCOUNTER — PATIENT MESSAGE (OUTPATIENT)
Dept: INTERNAL MEDICINE | Facility: CLINIC | Age: 57
End: 2022-02-01
Payer: COMMERCIAL

## 2022-02-01 NOTE — TELEPHONE ENCOUNTER
Trying to make sure pt has enough was only given she sd a 15 day supply   I changed the quantity make sure its correct?

## 2022-02-02 RX ORDER — ESTRADIOL 0.5 MG/1
0.5 TABLET ORAL DAILY
Qty: 30 TABLET | Refills: 5 | Status: SHIPPED | OUTPATIENT
Start: 2022-02-02 | End: 2022-02-18 | Stop reason: SDUPTHER

## 2022-02-18 RX ORDER — ESTRADIOL 0.5 MG/1
1 TABLET ORAL DAILY
Qty: 180 TABLET | Refills: 1 | Status: SHIPPED | OUTPATIENT
Start: 2022-02-18 | End: 2022-09-20 | Stop reason: SDUPTHER

## 2022-02-22 ENCOUNTER — PATIENT MESSAGE (OUTPATIENT)
Dept: INTERNAL MEDICINE | Facility: CLINIC | Age: 57
End: 2022-02-22
Payer: COMMERCIAL

## 2022-05-03 RX ORDER — BUTALBITAL, ACETAMINOPHEN AND CAFFEINE 50; 325; 40 MG/1; MG/1; MG/1
TABLET ORAL
Qty: 30 TABLET | Refills: 0 | Status: SHIPPED | OUTPATIENT
Start: 2022-05-03 | End: 2022-09-20 | Stop reason: SDUPTHER

## 2022-05-03 NOTE — TELEPHONE ENCOUNTER
No new care gaps identified.  [facility] Embedded Care Gaps. Reference number: [id number]. 5/03/2022   3:17:48 PM CDT

## 2022-05-05 RX ORDER — CYPROHEPTADINE HYDROCHLORIDE 4 MG/1
TABLET ORAL
Qty: 180 TABLET | Refills: 1 | Status: SHIPPED | OUTPATIENT
Start: 2022-05-05 | End: 2022-09-20

## 2022-05-05 NOTE — TELEPHONE ENCOUNTER
No new care gaps identified.  [facility] Embedded Care Gaps. Reference number: [id number]. 5/05/2022   5:22:51 PM CDT

## 2022-05-06 NOTE — TELEPHONE ENCOUNTER
Refill Authorization Note   Jeannie Saavedra  is requesting a refill authorization.  Brief Assessment and Rationale for Refill:  Approve     Medication Therapy Plan:       Medication Reconciliation Completed: No   Comments:     No Care Gaps recommended.     Note composed:8:27 PM 05/05/2022

## 2022-05-11 ENCOUNTER — PATIENT MESSAGE (OUTPATIENT)
Dept: INTERNAL MEDICINE | Facility: CLINIC | Age: 57
End: 2022-05-11
Payer: COMMERCIAL

## 2022-05-27 ENCOUNTER — TELEPHONE (OUTPATIENT)
Dept: INTERNAL MEDICINE | Facility: CLINIC | Age: 57
End: 2022-05-27
Payer: COMMERCIAL

## 2022-05-27 ENCOUNTER — PATIENT MESSAGE (OUTPATIENT)
Dept: INTERNAL MEDICINE | Facility: CLINIC | Age: 57
End: 2022-05-27
Payer: COMMERCIAL

## 2022-05-27 NOTE — TELEPHONE ENCOUNTER
----- Message from Nirmala Smithdaux sent at 5/26/2022  9:46 AM CDT -----  Contact: [phone] patient  Caller is requesting an earlier appointment then we can schedule.  Caller is requesting a message be sent to the provider.  If this is for urgent care symptoms, did you offer other providers at this location, providers at other locations, or [facility]? (yes, no, n/a):  no  If this is for the patients physical, did you offer to schedule next available and put on wait list, or to see NP or PA for their physical?  (yes, no, n/a):  yes  When is the next available appointment with their provider:  09/03/22  Reason for the appointment:  annual  Patient preference of timeframe to be scheduled:    Would the patient like a call back, or a response through their [facility] portal?:   pt portal  Comments:

## 2022-05-30 ENCOUNTER — PATIENT MESSAGE (OUTPATIENT)
Dept: INTERNAL MEDICINE | Facility: CLINIC | Age: 57
End: 2022-05-30
Payer: COMMERCIAL

## 2022-06-02 ENCOUNTER — PATIENT MESSAGE (OUTPATIENT)
Dept: INTERNAL MEDICINE | Facility: CLINIC | Age: 57
End: 2022-06-02
Payer: COMMERCIAL

## 2022-06-02 DIAGNOSIS — Z12.39 BREAST SCREENING: Primary | ICD-10-CM

## 2022-06-03 ENCOUNTER — TELEPHONE (OUTPATIENT)
Dept: INTERNAL MEDICINE | Facility: CLINIC | Age: 57
End: 2022-06-03
Payer: COMMERCIAL

## 2022-06-03 NOTE — TELEPHONE ENCOUNTER
----- Message from Mata Stone sent at 6/3/2022  4:42 PM CDT -----  Contact: self [phone]  Pt stated can send orders to diagnostic imaging stated provider know what orders.    Please call and advise

## 2022-06-03 NOTE — TELEPHONE ENCOUNTER
----- Message from Mata Stone sent at 6/3/2022  4:42 PM CDT -----  Contact: self [phone]  Pt stated can send orders to diagnostic imaging stated provider know what orders.    Please call and advise

## 2022-06-09 ENCOUNTER — TELEPHONE (OUTPATIENT)
Dept: INTERNAL MEDICINE | Facility: CLINIC | Age: 57
End: 2022-06-09
Payer: COMMERCIAL

## 2022-06-09 NOTE — TELEPHONE ENCOUNTER
----- Message from Ja Wilkes sent at 6/9/2022 11:56 AM CDT -----  Contact: [phone]  Pt needs her orders faxed over for her mammo her appt is tomorrow and they telling her that they do not have any orders.Pt would like a call back.

## 2022-06-22 ENCOUNTER — PATIENT MESSAGE (OUTPATIENT)
Dept: INTERNAL MEDICINE | Facility: CLINIC | Age: 57
End: 2022-06-22
Payer: COMMERCIAL

## 2022-06-24 ENCOUNTER — OCCUPATIONAL HEALTH (OUTPATIENT)
Dept: URGENT CARE | Facility: CLINIC | Age: 57
End: 2022-06-24

## 2022-06-24 DIAGNOSIS — Z02.89 ENCOUNTER FOR EXAMINATION REQUIRED BY DEPARTMENT OF TRANSPORTATION (DOT): Primary | ICD-10-CM

## 2022-06-24 PROCEDURE — 99499 PHYSICAL, RECERT DOT/CDL: ICD-10-PCS | Mod: S$GLB,,, | Performed by: PHYSICIAN ASSISTANT

## 2022-06-24 PROCEDURE — 99499 UNLISTED E&M SERVICE: CPT | Mod: S$GLB,,, | Performed by: PHYSICIAN ASSISTANT

## 2022-07-05 ENCOUNTER — PATIENT OUTREACH (OUTPATIENT)
Dept: ADMINISTRATIVE | Facility: HOSPITAL | Age: 57
End: 2022-07-05
Payer: COMMERCIAL

## 2022-07-05 NOTE — PROGRESS NOTES
Health Maintenance Due   Topic Date Due    Shingles Vaccine (1 of 2) Never done    COVID-19 Vaccine (3 - Booster for Pfizer series) 05/04/2022       HM updated. Immunizations reconciled.  Mammogram scanned into chart.     Tosin Polanco LPN   Clinical Care Coordinator  Primary Care and Wellness

## 2022-07-06 ENCOUNTER — LAB VISIT (OUTPATIENT)
Dept: LAB | Facility: HOSPITAL | Age: 57
End: 2022-07-06
Attending: INTERNAL MEDICINE
Payer: COMMERCIAL

## 2022-07-06 ENCOUNTER — PATIENT MESSAGE (OUTPATIENT)
Dept: INTERNAL MEDICINE | Facility: CLINIC | Age: 57
End: 2022-07-06

## 2022-07-06 ENCOUNTER — PATIENT MESSAGE (OUTPATIENT)
Dept: INTERNAL MEDICINE | Facility: CLINIC | Age: 57
End: 2022-07-06
Payer: COMMERCIAL

## 2022-07-06 ENCOUNTER — OFFICE VISIT (OUTPATIENT)
Dept: INTERNAL MEDICINE | Facility: CLINIC | Age: 57
End: 2022-07-06
Payer: COMMERCIAL

## 2022-07-06 ENCOUNTER — PATIENT MESSAGE (OUTPATIENT)
Dept: PHARMACY | Facility: CLINIC | Age: 57
End: 2022-07-06
Payer: COMMERCIAL

## 2022-07-06 VITALS
HEIGHT: 60 IN | SYSTOLIC BLOOD PRESSURE: 128 MMHG | OXYGEN SATURATION: 99 % | BODY MASS INDEX: 36.7 KG/M2 | DIASTOLIC BLOOD PRESSURE: 78 MMHG | WEIGHT: 186.94 LBS | HEART RATE: 89 BPM

## 2022-07-06 DIAGNOSIS — M54.32 SCIATICA OF LEFT SIDE: ICD-10-CM

## 2022-07-06 DIAGNOSIS — R30.0 DYSURIA: ICD-10-CM

## 2022-07-06 DIAGNOSIS — G56.02 LEFT CARPAL TUNNEL SYNDROME: ICD-10-CM

## 2022-07-06 DIAGNOSIS — Z00.00 ANNUAL PHYSICAL EXAM: ICD-10-CM

## 2022-07-06 DIAGNOSIS — E55.9 VITAMIN D DEFICIENCY: ICD-10-CM

## 2022-07-06 DIAGNOSIS — Z00.00 ANNUAL PHYSICAL EXAM: Primary | ICD-10-CM

## 2022-07-06 PROBLEM — Z12.11 SCREENING FOR COLON CANCER: Status: RESOLVED | Noted: 2018-06-20 | Resolved: 2022-07-06

## 2022-07-06 LAB
25(OH)D3+25(OH)D2 SERPL-MCNC: 59 NG/ML (ref 30–96)
ALBUMIN SERPL BCP-MCNC: 3.7 G/DL (ref 3.5–5.2)
ALP SERPL-CCNC: 59 U/L (ref 55–135)
ALT SERPL W/O P-5'-P-CCNC: 18 U/L (ref 10–44)
ANION GAP SERPL CALC-SCNC: 10 MMOL/L (ref 8–16)
AST SERPL-CCNC: 22 U/L (ref 10–40)
BILIRUB SERPL-MCNC: 0.4 MG/DL (ref 0.1–1)
BILIRUB UR QL STRIP: NEGATIVE
BUN SERPL-MCNC: 14 MG/DL (ref 6–20)
CALCIUM SERPL-MCNC: 9.5 MG/DL (ref 8.7–10.5)
CHLORIDE SERPL-SCNC: 102 MMOL/L (ref 95–110)
CHOLEST SERPL-MCNC: 192 MG/DL (ref 120–199)
CHOLEST/HDLC SERPL: 2.6 {RATIO} (ref 2–5)
CLARITY UR REFRACT.AUTO: CLEAR
CO2 SERPL-SCNC: 26 MMOL/L (ref 23–29)
COLOR UR AUTO: NORMAL
CREAT SERPL-MCNC: 0.8 MG/DL (ref 0.5–1.4)
ERYTHROCYTE [DISTWIDTH] IN BLOOD BY AUTOMATED COUNT: 12.8 % (ref 11.5–14.5)
EST. GFR  (AFRICAN AMERICAN): >60 ML/MIN/1.73 M^2
EST. GFR  (NON AFRICAN AMERICAN): >60 ML/MIN/1.73 M^2
GLUCOSE SERPL-MCNC: 86 MG/DL (ref 70–110)
GLUCOSE UR QL STRIP: NEGATIVE
HCT VFR BLD AUTO: 39.1 % (ref 37–48.5)
HDLC SERPL-MCNC: 75 MG/DL (ref 40–75)
HDLC SERPL: 39.1 % (ref 20–50)
HGB BLD-MCNC: 12.3 G/DL (ref 12–16)
HGB UR QL STRIP: NEGATIVE
KETONES UR QL STRIP: NEGATIVE
LDLC SERPL CALC-MCNC: 103.8 MG/DL (ref 63–159)
LEUKOCYTE ESTERASE UR QL STRIP: NEGATIVE
MCH RBC QN AUTO: 28.9 PG (ref 27–31)
MCHC RBC AUTO-ENTMCNC: 31.5 G/DL (ref 32–36)
MCV RBC AUTO: 92 FL (ref 82–98)
NITRITE UR QL STRIP: NEGATIVE
NONHDLC SERPL-MCNC: 117 MG/DL
PH UR STRIP: 6 [PH] (ref 5–8)
PLATELET # BLD AUTO: 331 K/UL (ref 150–450)
PMV BLD AUTO: 10.6 FL (ref 9.2–12.9)
POTASSIUM SERPL-SCNC: 4.1 MMOL/L (ref 3.5–5.1)
PROT SERPL-MCNC: 7.1 G/DL (ref 6–8.4)
PROT UR QL STRIP: NEGATIVE
RBC # BLD AUTO: 4.25 M/UL (ref 4–5.4)
SODIUM SERPL-SCNC: 138 MMOL/L (ref 136–145)
SP GR UR STRIP: 1 (ref 1–1.03)
TRIGL SERPL-MCNC: 66 MG/DL (ref 30–150)
URN SPEC COLLECT METH UR: NORMAL
WBC # BLD AUTO: 5.87 K/UL (ref 3.9–12.7)

## 2022-07-06 PROCEDURE — 99396 PREV VISIT EST AGE 40-64: CPT | Mod: S$GLB,,, | Performed by: INTERNAL MEDICINE

## 2022-07-06 PROCEDURE — 3074F SYST BP LT 130 MM HG: CPT | Mod: CPTII,S$GLB,, | Performed by: INTERNAL MEDICINE

## 2022-07-06 PROCEDURE — 1159F MED LIST DOCD IN RCRD: CPT | Mod: CPTII,S$GLB,, | Performed by: INTERNAL MEDICINE

## 2022-07-06 PROCEDURE — 99999 PR PBB SHADOW E&M-EST. PATIENT-LVL III: ICD-10-PCS | Mod: PBBFAC,,, | Performed by: INTERNAL MEDICINE

## 2022-07-06 PROCEDURE — 81003 URINALYSIS AUTO W/O SCOPE: CPT | Performed by: INTERNAL MEDICINE

## 2022-07-06 PROCEDURE — 80061 LIPID PANEL: CPT | Performed by: INTERNAL MEDICINE

## 2022-07-06 PROCEDURE — 1160F RVW MEDS BY RX/DR IN RCRD: CPT | Mod: CPTII,S$GLB,, | Performed by: INTERNAL MEDICINE

## 2022-07-06 PROCEDURE — 1160F PR REVIEW ALL MEDS BY PRESCRIBER/CLIN PHARMACIST DOCUMENTED: ICD-10-PCS | Mod: CPTII,S$GLB,, | Performed by: INTERNAL MEDICINE

## 2022-07-06 PROCEDURE — 85027 COMPLETE CBC AUTOMATED: CPT | Performed by: INTERNAL MEDICINE

## 2022-07-06 PROCEDURE — 99396 PR PREVENTIVE VISIT,EST,40-64: ICD-10-PCS | Mod: S$GLB,,, | Performed by: INTERNAL MEDICINE

## 2022-07-06 PROCEDURE — 3008F PR BODY MASS INDEX (BMI) DOCUMENTED: ICD-10-PCS | Mod: CPTII,S$GLB,, | Performed by: INTERNAL MEDICINE

## 2022-07-06 PROCEDURE — 1159F PR MEDICATION LIST DOCUMENTED IN MEDICAL RECORD: ICD-10-PCS | Mod: CPTII,S$GLB,, | Performed by: INTERNAL MEDICINE

## 2022-07-06 PROCEDURE — 3074F PR MOST RECENT SYSTOLIC BLOOD PRESSURE < 130 MM HG: ICD-10-PCS | Mod: CPTII,S$GLB,, | Performed by: INTERNAL MEDICINE

## 2022-07-06 PROCEDURE — 99999 PR PBB SHADOW E&M-EST. PATIENT-LVL III: CPT | Mod: PBBFAC,,, | Performed by: INTERNAL MEDICINE

## 2022-07-06 PROCEDURE — 82306 VITAMIN D 25 HYDROXY: CPT | Performed by: INTERNAL MEDICINE

## 2022-07-06 PROCEDURE — 3078F PR MOST RECENT DIASTOLIC BLOOD PRESSURE < 80 MM HG: ICD-10-PCS | Mod: CPTII,S$GLB,, | Performed by: INTERNAL MEDICINE

## 2022-07-06 PROCEDURE — 3078F DIAST BP <80 MM HG: CPT | Mod: CPTII,S$GLB,, | Performed by: INTERNAL MEDICINE

## 2022-07-06 PROCEDURE — 3008F BODY MASS INDEX DOCD: CPT | Mod: CPTII,S$GLB,, | Performed by: INTERNAL MEDICINE

## 2022-07-06 PROCEDURE — 36415 COLL VENOUS BLD VENIPUNCTURE: CPT | Performed by: INTERNAL MEDICINE

## 2022-07-06 PROCEDURE — 80053 COMPREHEN METABOLIC PANEL: CPT | Performed by: INTERNAL MEDICINE

## 2022-07-06 NOTE — PROGRESS NOTES
Subjective:       Patient ID: Jeannie Saavedra is a 56 y.o. female.    Chief Complaint: Annual Exam    HPI   C/o worsening of L sciatica.     Intermittent x years.  She has had injections in past.    C/o incr frequency x 1 week, and mildly discomfort.  No hematuria, fever.    Menopausal symptoms improved with low dose estrogen.    Intermittent insomnia.  Melatonin helpful     She decided against disability, as process is ridiculous.  Continues to work driving school bus.    L elbow still bothersome.  She has deferred on R carpal tunnel release.      Muscle spasm in foot.  She was told of tendonitis, tx with injection.      Stopped robaxin.    H/o borderline vit D.    Review of Systems   Constitutional: Negative for fever and unexpected weight change.   HENT: Negative for nasal congestion and postnasal drip.    Respiratory: Negative for chest tightness, shortness of breath and wheezing.    Cardiovascular: Negative for chest pain and leg swelling.   Gastrointestinal: Negative for abdominal pain, anal bleeding, constipation, diarrhea, nausea and vomiting.   Genitourinary: Negative for dysuria and urgency.   Musculoskeletal: Positive for back pain.   Integumentary:  Negative for rash.   Neurological: Negative for headaches.   Psychiatric/Behavioral: Negative for dysphoric mood and sleep disturbance. The patient is not nervous/anxious.          Objective:      Physical Exam  Constitutional:       General: She is not in acute distress.     Appearance: She is well-developed.   HENT:      Head: Normocephalic and atraumatic.      Right Ear: External ear normal.      Left Ear: External ear normal.      Nose: Nose normal.   Eyes:      General: No scleral icterus.        Right eye: No discharge.         Left eye: No discharge.      Conjunctiva/sclera: Conjunctivae normal.      Pupils: Pupils are equal, round, and reactive to light.   Neck:      Thyroid: No thyromegaly.      Vascular: No JVD.   Cardiovascular:      Rate and  Rhythm: Normal rate and regular rhythm.      Heart sounds: Normal heart sounds. No murmur heard.    No gallop.   Pulmonary:      Effort: Pulmonary effort is normal. No respiratory distress.      Breath sounds: Normal breath sounds. No wheezing or rales.   Abdominal:      General: Bowel sounds are normal. There is no distension.      Palpations: Abdomen is soft. There is no mass.      Tenderness: There is no abdominal tenderness. There is no guarding or rebound.   Musculoskeletal:         General: No tenderness. Normal range of motion.      Cervical back: Normal range of motion and neck supple.   Lymphadenopathy:      Cervical: No cervical adenopathy.   Skin:     General: Skin is warm and dry.      Findings: No rash.   Neurological:      Mental Status: She is alert and oriented to person, place, and time.      Cranial Nerves: No cranial nerve deficit.      Coordination: Coordination normal.   Psychiatric:         Behavior: Behavior normal.         Thought Content: Thought content normal.         Judgment: Judgment normal.         Assessment:       Problem List Items Addressed This Visit     Left carpal tunnel syndrome      Other Visit Diagnoses     Annual physical exam    -  Primary    Relevant Orders    CBC Without Differential    Comprehensive Metabolic Panel    Lipid Panel    Vitamin D    Sciatica of left side        Dysuria        Relevant Orders    Urinalysis, Reflex to Urine Culture Urine, Clean Catch    Vitamin D deficiency              Plan:       Jeannie was seen today for annual exam.    Diagnoses and all orders for this visit:    Annual physical exam  -     CBC Without Differential; Future  -     Comprehensive Metabolic Panel; Future  -     Lipid Panel; Future  -     Vitamin D; Future    Left carpal tunnel syndrome    Sciatica of left side    Dysuria  -     Urinalysis, Reflex to Urine Culture Urine, Clean Catch    Vitamin D deficiency       shingrix now     Covid booster rec'd      Daughter-  [email or web address].    Scan sheet to MO>

## 2022-07-07 ENCOUNTER — PATIENT MESSAGE (OUTPATIENT)
Dept: INTERNAL MEDICINE | Facility: CLINIC | Age: 57
End: 2022-07-07
Payer: COMMERCIAL

## 2022-07-07 RX ORDER — OXYBUTYNIN CHLORIDE 5 MG/1
TABLET ORAL
Qty: 21 TABLET | Refills: 0 | Status: SHIPPED | OUTPATIENT
Start: 2022-07-07 | End: 2022-07-21

## 2022-07-08 ENCOUNTER — PATIENT MESSAGE (OUTPATIENT)
Dept: INTERNAL MEDICINE | Facility: CLINIC | Age: 57
End: 2022-07-08
Payer: COMMERCIAL

## 2022-07-08 DIAGNOSIS — R35.0 URINARY FREQUENCY: Primary | ICD-10-CM

## 2022-07-10 NOTE — TELEPHONE ENCOUNTER
Pt still c/o urinary frequency, actually states the discomfort is worse. Pt also not tolerating the oxybutyin, states is gives her bad headaches and makes her nauseated. Pended referral to urology.

## 2022-07-11 ENCOUNTER — PATIENT MESSAGE (OUTPATIENT)
Dept: INTERNAL MEDICINE | Facility: CLINIC | Age: 57
End: 2022-07-11
Payer: COMMERCIAL

## 2022-07-15 ENCOUNTER — PATIENT MESSAGE (OUTPATIENT)
Dept: INTERNAL MEDICINE | Facility: CLINIC | Age: 57
End: 2022-07-15
Payer: COMMERCIAL

## 2022-07-21 ENCOUNTER — OFFICE VISIT (OUTPATIENT)
Dept: UROLOGY | Facility: CLINIC | Age: 57
End: 2022-07-21
Payer: COMMERCIAL

## 2022-07-21 VITALS
HEART RATE: 79 BPM | WEIGHT: 184.75 LBS | SYSTOLIC BLOOD PRESSURE: 119 MMHG | BODY MASS INDEX: 36.27 KG/M2 | HEIGHT: 60 IN | DIASTOLIC BLOOD PRESSURE: 77 MMHG

## 2022-07-21 DIAGNOSIS — R35.0 INCREASED FREQUENCY OF URINATION: ICD-10-CM

## 2022-07-21 DIAGNOSIS — R39.89 BLADDER PAIN: ICD-10-CM

## 2022-07-21 DIAGNOSIS — Z87.442 HISTORY OF KIDNEY STONES: ICD-10-CM

## 2022-07-21 DIAGNOSIS — R30.0 DYSURIA: Primary | ICD-10-CM

## 2022-07-21 PROCEDURE — 99999 PR PBB SHADOW E&M-EST. PATIENT-LVL III: ICD-10-PCS | Mod: PBBFAC,,, | Performed by: UROLOGY

## 2022-07-21 PROCEDURE — 3074F SYST BP LT 130 MM HG: CPT | Mod: CPTII,S$GLB,, | Performed by: UROLOGY

## 2022-07-21 PROCEDURE — 99204 OFFICE O/P NEW MOD 45 MIN: CPT | Mod: S$GLB,,, | Performed by: UROLOGY

## 2022-07-21 PROCEDURE — 99999 PR PBB SHADOW E&M-EST. PATIENT-LVL III: CPT | Mod: PBBFAC,,, | Performed by: UROLOGY

## 2022-07-21 PROCEDURE — 3008F BODY MASS INDEX DOCD: CPT | Mod: CPTII,S$GLB,, | Performed by: UROLOGY

## 2022-07-21 PROCEDURE — 3078F PR MOST RECENT DIASTOLIC BLOOD PRESSURE < 80 MM HG: ICD-10-PCS | Mod: CPTII,S$GLB,, | Performed by: UROLOGY

## 2022-07-21 PROCEDURE — 1159F MED LIST DOCD IN RCRD: CPT | Mod: CPTII,S$GLB,, | Performed by: UROLOGY

## 2022-07-21 PROCEDURE — 1159F PR MEDICATION LIST DOCUMENTED IN MEDICAL RECORD: ICD-10-PCS | Mod: CPTII,S$GLB,, | Performed by: UROLOGY

## 2022-07-21 PROCEDURE — 3008F PR BODY MASS INDEX (BMI) DOCUMENTED: ICD-10-PCS | Mod: CPTII,S$GLB,, | Performed by: UROLOGY

## 2022-07-21 PROCEDURE — 99204 PR OFFICE/OUTPT VISIT, NEW, LEVL IV, 45-59 MIN: ICD-10-PCS | Mod: S$GLB,,, | Performed by: UROLOGY

## 2022-07-21 PROCEDURE — 3078F DIAST BP <80 MM HG: CPT | Mod: CPTII,S$GLB,, | Performed by: UROLOGY

## 2022-07-21 PROCEDURE — 87481 CANDIDA DNA AMP PROBE: CPT | Mod: 59 | Performed by: UROLOGY

## 2022-07-21 PROCEDURE — 87086 URINE CULTURE/COLONY COUNT: CPT | Performed by: UROLOGY

## 2022-07-21 PROCEDURE — 3074F PR MOST RECENT SYSTOLIC BLOOD PRESSURE < 130 MM HG: ICD-10-PCS | Mod: CPTII,S$GLB,, | Performed by: UROLOGY

## 2022-07-21 RX ORDER — LIDOCAINE HYDROCHLORIDE 20 MG/ML
JELLY TOPICAL ONCE
Status: CANCELLED | OUTPATIENT
Start: 2022-07-21 | End: 2022-07-21

## 2022-07-21 RX ORDER — DOXYCYCLINE HYCLATE 100 MG
100 TABLET ORAL ONCE
Status: CANCELLED | OUTPATIENT
Start: 2022-07-21 | End: 2022-07-21

## 2022-07-21 NOTE — PROGRESS NOTES
CHIEF COMPLAINT:    Mrs. Saavedra is a 56 y.o. female presenting for a consultation at the request of Dr. Jannette Salguero. Patient presents with acute onset of bladder pain and urinary urgency, frequency.    PRESENTING ILLNESS:    Jeannie Saavedra is a 56 y.o. female who states that she has a history of nephrolithiasis.  Her last episode of renal colic was in .  She was able to pass the stone spontaneously.  She states that she had an acute onset of urinary urgency, frequency every 15 minutes, nocturia x 6-8.  If she drinks water, then she has to urinate every 15 minutes.  She also has dysuria.  She states that symptoms got better but they are persistent.  She denies any sexual trauma, injury, bladder infection or other precipitating factor. She was prescribed oxybutynin by Dr. Salguero but developed headache, nausea and vomiting and had to discontinue its use.  Denies gross hematuria, no recurrent UTI.      She works as a  and product  (the buses) and has been at this job for quite some time.  She states when she has to be on the bus, she dehydrates herself so she does not have to urinate during the bus ride, she is able to hold her urine because she dehydrates herself.  She does have a history of degenerative spine disease but that has been ongoing for 10 years.      , hysterectomy in , no pain, has a bowel movement every day, sometimes has to drink milk in order to have a bm.       REVIEW OF SYSTEMS:    Review of Systems   Constitutional: Negative.    HENT: Negative.    Eyes: Negative.    Respiratory: Negative.    Cardiovascular: Negative.    Gastrointestinal: Negative.    Genitourinary: Positive for frequency and urgency. Negative for hematuria.   Musculoskeletal: Positive for back pain.   Skin: Negative.    Neurological: Negative.    Endo/Heme/Allergies: Negative.    Psychiatric/Behavioral: Negative.        PATIENT HISTORY:    Past Medical History:   Diagnosis Date     Allergy     Asthma     Keloid cicatrix     Nephrolithiasis     Osteoarthritis of lumbar spine     Sciatica        Past Surgical History:   Procedure Laterality Date    CARPAL TUNNEL RELEASE Left 2020    Procedure: RELEASE, CARPAL TUNNEL-Left;  Surgeon: Fay Muniz MD;  Location: [facility];  Service: Orthopedics;  Laterality: Left;  General/ regional     SECTION      COLONOSCOPY N/A 2018    Procedure: COLONOSCOPY with SOFÍA in 4 weeks;  Surgeon: Cali Ramos MD;  Location: [facility] ([address] [place] [address]);  Service: Endoscopy;  Laterality: N/A;    HYSTERECTOMY      for hypermenorrhea    INJECTION OF STEROID Right 2020    Procedure: INJECTION, STEROID-right carpal tunnel;  Surgeon: Fay Muniz MD;  Location: [facility];  Service: Orthopedics;  Laterality: Right;  General/Regional       Family History   Problem Relation Age of Onset    Heart disease Mother         cardiomyopathy, CAD.    Hyperlipidemia Mother     Hypertension Mother     Heart disease Father     Alcohol abuse Sister     Depression Sister     No Known Problems Brother     No Known Problems Daughter     Asthma Son     Breast cancer Paternal Grandmother        Social History     Socioeconomic History    Marital status: Single   Occupational History     Employer: [employer]     Comment: busdriver   Tobacco Use    Smoking status: Never Smoker    Smokeless tobacco: Never Used   Substance and Sexual Activity    Alcohol use: Not Currently     Comment: rarely - special occasions only    Drug use: No   Social History Narrative    Walks 4 days a week, for 20-30 minutes.        Single.     Social Determinants of Health     Financial Resource Strain: Medium Risk    Difficulty of Paying Living Expenses: Somewhat hard   Food Insecurity: Food Insecurity Present    Worried About Running Out of Food in the Last Year: Sometimes true    Ran Out of Food in the Last Year: Sometimes true    Transportation Needs: No Transportation Needs    Lack of Transportation (Medical): No    Lack of Transportation (Non-Medical): No   Physical Activity: Sufficiently Active    Days of Exercise per Week: 5 days    Minutes of Exercise per Session: 60 min   Stress: No Stress Concern Present    Feeling of Stress : Not at all   Social Connections: Unknown    Frequency of Communication with Friends and Family: Three times a week    Frequency of Social Gatherings with Friends and Family: Once a week    Active Member of Clubs or Organizations: No    Attends Club or Organization Meetings: Never    Marital Status: Patient refused   Housing Stability: Low Risk     Unable to Pay for Housing in the Last Year: No    Number of Places Lived in the Last Year: 1    Unstable Housing in the Last Year: No       Allergies:  Patient has no known allergies.    Medications:  Outpatient Encounter Medications as of 7/21/2022   Medication Sig Dispense Refill    butalbital-acetaminophen-caffeine -40 mg (FIORICET, ESGIC) -40 mg per tablet 1-2 tabs po q 4-6 h prn pain 30 tablet 0    cyproheptadine (PERIACTIN) 4 mg tablet Take 1 tablet by mouth twice daily as needed 180 tablet 1    estradioL (ESTRACE) 0.5 MG tablet Take 2 tablets (1 mg total) by mouth once daily. 180 tablet 1    multivitamin capsule Take 1 capsule by mouth once daily.      [DISCONTINUED] oxybutynin (DITROPAN) 5 MG Tab 1 tab po tid prn bladder pain (Patient not taking: Reported on 7/21/2022) 21 tablet 0     No facility-administered encounter medications on file as of 7/21/2022.         PHYSICAL EXAMINATION:    The patient generally appears in good health, is appropriately interactive, and is in no apparent distress.    Skin: No lesions.    Mental: Cooperative with normal affect.    Neuro: Grossly intact.    HEENT: Normal. No evidence of lymphadenopathy.    Chest:  normal inspiratory effort.    Abdomen:  Soft, non-tender. No masses or organomegaly.  Bladder is not palpable. No evidence of flank discomfort. No evidence of inguinal hernia.    Extremities: No clubbing, cyanosis, or edema    Normal external female genitalia  Urethral meatus is normal  Urethra and bladder are nontender to bimanual exam  Well supported anteriorly and posteriorly, hard bowel movement palpated on the posterior wall.    Uterus and cervix are surgically absent  No adnexal masses  PVR by catheterization was 35 ml    LABS:    Lab Results   Component Value Date    BUN 14 07/06/2022    CREATININE 0.8 07/06/2022     UA 1.015, pH 6, tr protein, normal urobilinogen, tr blood, otherwise, negative   On the microscopic exam, there were no RBC's    IMPRESSION:    Encounter Diagnoses   Name Primary?    Dysuria Yes    Bladder pain     History of kidney stones        PLAN:    1. Unclear what brought on the acute onset of her symptoms.  She states she had mild left flank tenderness on July 6th which reminded her of what it was like when she had a stone years ago.  Last renal colic was 2015  2.  CT RSS and cystoscopy ordered.   3.  Did not prescribe another OAB medication given her reaction to the oxybutynin.  Will revisit once the workup is done.     I spent 45 minutes with the patient of which more than half was spent in direct consultation with the patient in regards to our treatment and plan.      Copy to:  Dr. Jannette Salguero.

## 2022-07-22 LAB
BACTERIAL VAGINOSIS DNA: NEGATIVE
CANDIDA GLABRATA DNA: NEGATIVE
CANDIDA KRUSEI DNA: NEGATIVE
CANDIDA RRNA VAG QL PROBE: NEGATIVE
T VAGINALIS RRNA GENITAL QL PROBE: NEGATIVE

## 2022-07-23 LAB — BACTERIA UR CULT: NO GROWTH

## 2022-07-26 ENCOUNTER — HOSPITAL ENCOUNTER (OUTPATIENT)
Dept: RADIOLOGY | Facility: HOSPITAL | Age: 57
Discharge: HOME | End: 2022-07-26
Attending: UROLOGY
Payer: COMMERCIAL

## 2022-07-26 DIAGNOSIS — R30.0 DYSURIA: ICD-10-CM

## 2022-07-26 DIAGNOSIS — R39.89 BLADDER PAIN: ICD-10-CM

## 2022-07-26 DIAGNOSIS — Z87.442 HISTORY OF KIDNEY STONES: ICD-10-CM

## 2022-07-26 PROCEDURE — 74176 CT ABD & PELVIS W/O CONTRAST: CPT | Mod: TC

## 2022-07-26 PROCEDURE — 74176 CT RENAL STONE STUDY ABD PELVIS WO: ICD-10-PCS | Mod: 26,,, | Performed by: RADIOLOGY

## 2022-07-26 PROCEDURE — 74176 CT ABD & PELVIS W/O CONTRAST: CPT | Mod: 26,,, | Performed by: RADIOLOGY

## 2022-08-03 ENCOUNTER — PATIENT MESSAGE (OUTPATIENT)
Dept: UROLOGY | Facility: CLINIC | Age: 57
End: 2022-08-03
Payer: COMMERCIAL

## 2022-08-03 DIAGNOSIS — N28.1 RENAL CYST, ACQUIRED, LEFT: Primary | ICD-10-CM

## 2022-08-05 NOTE — TELEPHONE ENCOUNTER
Spoke to the patient and discussed the findings of the CTRSS.  A renal ultrasound was ordered to follow up on the cyst.  The fact that it has been stable since 2016 is reassuring of the benign nature, but I would like to have a different look at it.     She should keep the cystoscopy appointment.    She states she has had shortness of breath with exertion.  Encouraged her to see her primary, could be a heart issue as well.

## 2022-08-17 ENCOUNTER — HOSPITAL ENCOUNTER (OUTPATIENT)
Dept: RADIOLOGY | Facility: HOSPITAL | Age: 57
Discharge: HOME | End: 2022-08-17
Attending: UROLOGY
Payer: COMMERCIAL

## 2022-08-17 DIAGNOSIS — N28.1 RENAL CYST, ACQUIRED, LEFT: ICD-10-CM

## 2022-08-17 PROCEDURE — 76770 US EXAM ABDO BACK WALL COMP: CPT | Mod: 26,,, | Performed by: RADIOLOGY

## 2022-08-17 PROCEDURE — 76770 US KIDNEY: ICD-10-PCS | Mod: 26,,, | Performed by: RADIOLOGY

## 2022-08-17 PROCEDURE — 76770 US EXAM ABDO BACK WALL COMP: CPT | Mod: TC

## 2022-08-22 ENCOUNTER — PROCEDURE VISIT (OUTPATIENT)
Dept: UROLOGY | Facility: CLINIC | Age: 57
End: 2022-08-22
Payer: COMMERCIAL

## 2022-08-22 VITALS
SYSTOLIC BLOOD PRESSURE: 143 MMHG | HEIGHT: 60 IN | BODY MASS INDEX: 35.99 KG/M2 | DIASTOLIC BLOOD PRESSURE: 79 MMHG | TEMPERATURE: 98 F | WEIGHT: 183.31 LBS | HEART RATE: 73 BPM | RESPIRATION RATE: 17 BRPM

## 2022-08-22 DIAGNOSIS — Z87.442 HISTORY OF KIDNEY STONES: ICD-10-CM

## 2022-08-22 DIAGNOSIS — R30.0 DYSURIA: ICD-10-CM

## 2022-08-22 DIAGNOSIS — R39.89 BLADDER PAIN: ICD-10-CM

## 2022-08-22 PROCEDURE — 52000 PR CYSTOURETHROSCOPY: ICD-10-PCS | Mod: S$GLB,,, | Performed by: UROLOGY

## 2022-08-22 PROCEDURE — 52000 CYSTOURETHROSCOPY: CPT | Mod: S$GLB,,, | Performed by: UROLOGY

## 2022-08-22 RX ORDER — LIDOCAINE HYDROCHLORIDE 20 MG/ML
JELLY TOPICAL ONCE
Status: COMPLETED | OUTPATIENT
Start: 2022-08-22 | End: 2022-08-22

## 2022-08-22 RX ORDER — URINARY ANTISEPTIC ANTISPASMODIC 81.6; 40.8; 10.8; .12 MG/1; MG/1; MG/1; MG/1
1 TABLET ORAL 4 TIMES DAILY PRN
Qty: 60 TABLET | Refills: 11 | Status: ON HOLD | OUTPATIENT
Start: 2022-08-22 | End: 2023-06-02 | Stop reason: HOSPADM

## 2022-08-22 RX ORDER — DOXYCYCLINE HYCLATE 100 MG
100 TABLET ORAL ONCE
Status: COMPLETED | OUTPATIENT
Start: 2022-08-22 | End: 2022-08-22

## 2022-08-22 RX ADMIN — Medication 100 MG: at 10:08

## 2022-08-22 RX ADMIN — LIDOCAINE HYDROCHLORIDE: 20 JELLY TOPICAL at 09:08

## 2022-08-22 NOTE — PROCEDURES
Intubation  Performed by: Emeterio Barba CRNA  Authorized by: Kar Kim MD     Intubation:     Induction:  Intravenous    Intubated:  Postinduction    Attempts:  1    Attempted By:  CRNA    Method of Intubation:  Direct    Blade:  Heredia 2    Laryngeal View Grade: Grade I - full view of chords      Difficult Airway Encountered?: No      Complications:  None    Airway Device:  Oral endotracheal tube    Airway Device Size:  7.5    Style/Cuff Inflation:  Cuffed    Inflation Amount (mL):  5    Tube secured:  21    Secured at:  The lips    Placement Verified By:  Capnometry    Complicating Factors:  None    Findings Post-Intubation:  BS equal bilateral and atraumatic/condition of teeth unchanged         Procedures     CYSTOSCOPY REPORT    Pre Procedure Diagnosis:   Bladder pain, urgency, frequency    Post Procedure Diagnosis:  Normal lower urinary tract, interstitial cystitis    Anesthesia: 10 cc 2% lidocaine jelly applied per urethra.    14 FR Flexible Olympus cystoscope used.    FINDINGS:  Dome, anterior, posterior, lateral walls and bladder base free of urothelial abnormalities. Right and left ureteral orifices in the normal postion and configuration, both effluxed clear urine.  Bladder neck and urethra were normal.    Specimen:  none    The patient was taken to the cystoscopy suite and placed in dorsal lithotomy position.  The genitalia was prepped and draped  in the usual sterile fashion.  Two percent lidocaine jelly was inserted in the urethra.  After sufficent time had passed to allow good local anesthesia, the cystoscope was inserted in the urethra and passed into the bladder visualizing the urethra along its entire course.  The dome, anterior, posterior and lateral walls were examined systematically.  The ureteral orifices were in their usual position and configuration.  The cystoscope was turned upon itself 180 degrees to visualize the bladder neck.  The cystoscope was then brought to the level of the bladder neck, the water was turned on and the urethra was visualized.  The cystoscope was removed and the patient was instructed to urinate prior to leaving the office.     Post procedure medication:  Doxycycline 100 mg x 1.  Urogesic Blue prescribed.      ADDITIONAL NOTES:  CT RSS 7/26/2022 showed a non obstructing 5 mm stone and a left cyst which was confirmed to be a simple cyst on renal ultrasound from 8/17/2022.  Results discussed.  The patient was provided with the IC websites. She identified tea as a bladder irritant for herself.      ASSESSMENT/PLAN:  56 year old woman status post flexible cystoscopy.  1. Push fluids for 24 hours.  2. May see blood in the urine, this should gradually improve over the  next 2-3 days.  3. The patient was instructed to return to the office or go to the emergency should fever, chills, cloudy urine, or inability to urinate develop.  4. Follow up in 6 months.

## 2022-08-22 NOTE — PATIENT INSTRUCTIONS
What to Expect After a Cystoscopy  For the next 24-48 hours, you may feel a mild burning when you urinate. This burning is normal and expected. Drink plenty of water to dilute the urine to help relieve the burning sensation. You may also see a small amount of blood in your urine after the procedure.    Unless you are already taking antibiotics, you may be given an antibiotic after the test to prevent infection.    Signs and Symptoms to Report  Call the [facility] at [phone] if you develop any of the following:  Fever of 101 degrees or higher  Chills or persistent bleeding  Inability to urinate      Interstitial cystitis association www.ichelp.org  Interstitis cystitis network www.ic-network.com  ICN Food List (This is an john from the Interstitial Cystitis Network that is available on iOS and Android.  Downloadable to your phone for easy access when going out or at the grocery store, to help make decisions on foods which may be bladder irritants)   ICN Bladder Tracker (Free John from the Interstitial Cystitis Network that tracks overall wellness, voiding symptoms, and other factors which come into play with IC.  This helps to guide therapy and assess response.)

## 2022-09-08 ENCOUNTER — PATIENT MESSAGE (OUTPATIENT)
Dept: INTERNAL MEDICINE | Facility: CLINIC | Age: 57
End: 2022-09-08
Payer: COMMERCIAL

## 2022-09-08 RX ORDER — VALACYCLOVIR HYDROCHLORIDE 1 G/1
1000 TABLET, FILM COATED ORAL 2 TIMES DAILY
Qty: 60 TABLET | Refills: 11 | Status: ON HOLD | OUTPATIENT
Start: 2022-09-08 | End: 2023-06-02 | Stop reason: HOSPADM

## 2022-09-09 NOTE — TELEPHONE ENCOUNTER
No new care gaps identified.  [facility] Embedded Care Gaps. Reference number: [id number]. 9/08/2022   7:33:21 PM CDT

## 2022-09-09 NOTE — TELEPHONE ENCOUNTER
Pt requesting a Rx for Valacyclovir. Med pended for your review. This would be a new Rx as a previous MD prescribed in the past.

## 2022-10-17 ENCOUNTER — CLINICAL SUPPORT (OUTPATIENT)
Dept: OTHER | Facility: CLINIC | Age: 57
End: 2022-10-17
Payer: COMMERCIAL

## 2022-10-17 DIAGNOSIS — Z00.8 ENCOUNTER FOR OTHER GENERAL EXAMINATION: ICD-10-CM

## 2022-10-24 LAB
HDLC SERPL-MCNC: 87 MG/DL
POC CHOLESTEROL, LDL (DOCK): 95 MG/DL
POC CHOLESTEROL, TOTAL: 193 MG/DL
POC GLUCOSE, FASTING: 88 MG/DL (ref 60–110)
TRIGL SERPL-MCNC: 60 MG/DL

## 2022-10-26 ENCOUNTER — PATIENT MESSAGE (OUTPATIENT)
Dept: INTERNAL MEDICINE | Facility: CLINIC | Age: 57
End: 2022-10-26
Payer: COMMERCIAL

## 2022-10-26 DIAGNOSIS — M72.2 PLANTAR FASCIITIS: Primary | ICD-10-CM

## 2022-10-29 VITALS
DIASTOLIC BLOOD PRESSURE: 88 MMHG | HEIGHT: 60 IN | SYSTOLIC BLOOD PRESSURE: 142 MMHG | BODY MASS INDEX: 33.96 KG/M2 | WEIGHT: 173 LBS

## 2023-01-04 ENCOUNTER — PATIENT MESSAGE (OUTPATIENT)
Dept: INTERNAL MEDICINE | Facility: CLINIC | Age: 58
End: 2023-01-04
Payer: COMMERCIAL

## 2023-01-04 RX ORDER — DOXEPIN HYDROCHLORIDE 10 MG/1
CAPSULE ORAL
Qty: 60 CAPSULE | Refills: 2 | Status: ON HOLD | OUTPATIENT
Start: 2023-01-04 | End: 2023-06-02 | Stop reason: HOSPADM

## 2023-02-28 ENCOUNTER — PATIENT MESSAGE (OUTPATIENT)
Dept: INTERNAL MEDICINE | Facility: CLINIC | Age: 58
End: 2023-02-28
Payer: COMMERCIAL

## 2023-03-08 ENCOUNTER — PATIENT MESSAGE (OUTPATIENT)
Dept: INTERNAL MEDICINE | Facility: CLINIC | Age: 58
End: 2023-03-08
Payer: COMMERCIAL

## 2023-03-08 DIAGNOSIS — M72.2 PLANTAR FASCIITIS: Primary | ICD-10-CM

## 2023-03-08 DIAGNOSIS — M25.569 KNEE PAIN, UNSPECIFIED CHRONICITY, UNSPECIFIED LATERALITY: ICD-10-CM

## 2023-03-09 RX ORDER — DICLOFENAC SODIUM 75 MG/1
75 TABLET, DELAYED RELEASE ORAL 2 TIMES DAILY
Qty: 20 TABLET | Refills: 0 | Status: ON HOLD | OUTPATIENT
Start: 2023-03-09 | End: 2023-06-02 | Stop reason: HOSPADM

## 2023-03-09 RX ORDER — DICLOFENAC SODIUM 10 MG/G
2 GEL TOPICAL 4 TIMES DAILY
Qty: 100 G | Refills: 0 | Status: ON HOLD | OUTPATIENT
Start: 2023-03-09 | End: 2023-06-02 | Stop reason: HOSPADM

## 2023-04-25 ENCOUNTER — PATIENT MESSAGE (OUTPATIENT)
Dept: INTERNAL MEDICINE | Facility: CLINIC | Age: 58
End: 2023-04-25
Payer: COMMERCIAL

## 2023-04-25 DIAGNOSIS — Z12.39 BREAST SCREENING: Primary | ICD-10-CM

## 2023-04-26 ENCOUNTER — PATIENT MESSAGE (OUTPATIENT)
Dept: INTERNAL MEDICINE | Facility: CLINIC | Age: 58
End: 2023-04-26
Payer: COMMERCIAL

## 2023-04-27 RX ORDER — ESTRADIOL 0.5 MG/1
TABLET ORAL
Qty: 180 TABLET | Refills: 0 | Status: SHIPPED | OUTPATIENT
Start: 2023-04-27 | End: 2023-07-03

## 2023-04-27 NOTE — TELEPHONE ENCOUNTER
Refill Routing Note   Medication(s) are not appropriate for processing by [facility] for the following reason(s):      Medication outside of protocol    [facility] action(s):  Route            Appointments  past 12m or future 3m with PCP    Date Provider   Last Visit   7/6/2022 Jannette Salguero MD   Next Visit   7/3/2023 Jannette Salguero MD   ED visits in past 90 days: 0        Note composed:4:33 PM 04/27/2023

## 2023-05-22 ENCOUNTER — TELEPHONE (OUTPATIENT)
Dept: SURGERY | Facility: CLINIC | Age: 58
End: 2023-05-22
Payer: COMMERCIAL

## 2023-05-22 NOTE — TELEPHONE ENCOUNTER
Spoke with patient and explained that schedulers will be reaching out to her for colonoscopy scheduling. Epic Message sent to [facility] endoscopist [place]. Patient verbalizes understanding.

## 2023-05-30 ENCOUNTER — TELEPHONE (OUTPATIENT)
Dept: ENDOSCOPY | Facility: HOSPITAL | Age: 58
End: 2023-05-30
Payer: COMMERCIAL

## 2023-05-30 VITALS — HEIGHT: 60 IN | BODY MASS INDEX: 33.96 KG/M2 | WEIGHT: 173 LBS

## 2023-05-30 DIAGNOSIS — Z86.010 HISTORY OF COLON POLYPS: Primary | ICD-10-CM

## 2023-05-30 DIAGNOSIS — Z12.11 SPECIAL SCREENING FOR MALIGNANT NEOPLASMS, COLON: ICD-10-CM

## 2023-05-30 RX ORDER — POLYETHYLENE GLYCOL 3350, SODIUM SULFATE ANHYDROUS, SODIUM BICARBONATE, SODIUM CHLORIDE, POTASSIUM CHLORIDE 236; 22.74; 6.74; 5.86; 2.97 G/4L; G/4L; G/4L; G/4L; G/4L
4 POWDER, FOR SOLUTION ORAL ONCE
Qty: 4000 ML | Refills: 0 | Status: SHIPPED | OUTPATIENT
Start: 2023-05-30 | End: 2023-05-30

## 2023-05-30 NOTE — TELEPHONE ENCOUNTER
Returned patient's call to endoscopy scheduling main line to schedule for a colonoscopy. Spoke to patient. Colonoscopy scheduled. 6/2/23 with an arrival time of 9:50 AM confirmed.  Instructions reviewed and verbalized. Instructions sent via portal.  Patient verbalized an understanding.

## 2023-05-30 NOTE — TELEPHONE ENCOUNTER
"----- Message from Kiki Wei RN sent at 2023  2:22 PM CDT -----  Procedure: Colonoscopy    Diagnosis: Surveillance colonoscopy - Hx of colon polyps    Procedure Timin-12 weeks    #If within 4 weeks selected, please lew as high priority#    #If greater than 12 weeks, please select "5-12 weeks" and delay sending until 2 months prior to requested date#     Provider: Richard    Location: No Preference    Additional Scheduling Information: No scheduling concerns    Prep Specifications:Standard prep    Have you attached a patient to this message: yes     "

## 2023-06-02 ENCOUNTER — HOSPITAL ENCOUNTER (OUTPATIENT)
Facility: HOSPITAL | Age: 58
Discharge: HOME | End: 2023-06-02
Attending: COLON & RECTAL SURGERY | Admitting: COLON & RECTAL SURGERY
Payer: COMMERCIAL

## 2023-06-02 ENCOUNTER — ANESTHESIA EVENT (OUTPATIENT)
Dept: ENDOSCOPY | Facility: HOSPITAL | Age: 58
End: 2023-06-02
Payer: COMMERCIAL

## 2023-06-02 ENCOUNTER — ANESTHESIA (OUTPATIENT)
Dept: ENDOSCOPY | Facility: HOSPITAL | Age: 58
End: 2023-06-02
Payer: COMMERCIAL

## 2023-06-02 VITALS
DIASTOLIC BLOOD PRESSURE: 72 MMHG | BODY MASS INDEX: 35.34 KG/M2 | TEMPERATURE: 98 F | SYSTOLIC BLOOD PRESSURE: 118 MMHG | WEIGHT: 180 LBS | OXYGEN SATURATION: 100 % | HEIGHT: 60 IN | RESPIRATION RATE: 16 BRPM | HEART RATE: 69 BPM

## 2023-06-02 DIAGNOSIS — Z86.010 HISTORY OF COLON POLYPS: Primary | ICD-10-CM

## 2023-06-02 PROCEDURE — E9220 PRA ENDO ANESTHESIA: ICD-10-PCS | Mod: ,,, | Performed by: NURSE ANESTHETIST, CERTIFIED REGISTERED

## 2023-06-02 PROCEDURE — 37000009 HC ANESTHESIA EA ADD 15 MINS: Performed by: COLON & RECTAL SURGERY

## 2023-06-02 PROCEDURE — 37000008 HC ANESTHESIA 1ST 15 MINUTES: Performed by: COLON & RECTAL SURGERY

## 2023-06-02 PROCEDURE — E9220 PRA ENDO ANESTHESIA: HCPCS | Mod: ,,, | Performed by: NURSE ANESTHETIST, CERTIFIED REGISTERED

## 2023-06-02 PROCEDURE — G0105 COLORECTAL SCRN; HI RISK IND: ICD-10-PCS | Mod: ,,, | Performed by: COLON & RECTAL SURGERY

## 2023-06-02 PROCEDURE — G0105 COLORECTAL SCRN; HI RISK IND: HCPCS | Mod: ,,, | Performed by: COLON & RECTAL SURGERY

## 2023-06-02 PROCEDURE — 25000003 PHARM REV CODE 250: Performed by: NURSE ANESTHETIST, CERTIFIED REGISTERED

## 2023-06-02 PROCEDURE — 25000003 PHARM REV CODE 250: Performed by: COLON & RECTAL SURGERY

## 2023-06-02 PROCEDURE — G0105 COLORECTAL SCRN; HI RISK IND: HCPCS | Performed by: COLON & RECTAL SURGERY

## 2023-06-02 PROCEDURE — 63600175 PHARM REV CODE 636 W HCPCS: Performed by: NURSE ANESTHETIST, CERTIFIED REGISTERED

## 2023-06-02 RX ORDER — LIDOCAINE HYDROCHLORIDE 20 MG/ML
INJECTION INTRAVENOUS
Status: DISCONTINUED | OUTPATIENT
Start: 2023-06-02 | End: 2023-06-02

## 2023-06-02 RX ORDER — SODIUM CHLORIDE 9 MG/ML
INJECTION, SOLUTION INTRAVENOUS CONTINUOUS
Status: DISCONTINUED | OUTPATIENT
Start: 2023-06-02 | End: 2023-06-02 | Stop reason: HOSPADM

## 2023-06-02 RX ORDER — PROPOFOL 10 MG/ML
VIAL (ML) INTRAVENOUS
Status: DISCONTINUED | OUTPATIENT
Start: 2023-06-02 | End: 2023-06-02

## 2023-06-02 RX ORDER — PROPOFOL 10 MG/ML
VIAL (ML) INTRAVENOUS CONTINUOUS PRN
Status: DISCONTINUED | OUTPATIENT
Start: 2023-06-02 | End: 2023-06-02

## 2023-06-02 RX ADMIN — SODIUM CHLORIDE: 0.9 INJECTION, SOLUTION INTRAVENOUS at 10:06

## 2023-06-02 RX ADMIN — SODIUM CHLORIDE: 9 INJECTION, SOLUTION INTRAVENOUS at 09:06

## 2023-06-02 RX ADMIN — PROPOFOL 100 MG: 10 INJECTION, EMULSION INTRAVENOUS at 10:06

## 2023-06-02 RX ADMIN — LIDOCAINE HYDROCHLORIDE 100 MG: 20 INJECTION INTRAVENOUS at 10:06

## 2023-06-02 RX ADMIN — Medication 175 MCG/KG/MIN: at 10:06

## 2023-06-02 NOTE — ANESTHESIA PREPROCEDURE EVALUATION
06/02/2023  Jeannie Saavedra is a 57 y.o., female with hx of colon polyps here for colonoscopy    Past Medical History:   Diagnosis Date    Allergy     Asthma     Keloid cicatrix     Nephrolithiasis     Osteoarthritis of lumbar spine     Sciatica      Lab Results   Component Value Date    WBC 5.87 07/06/2022    HGB 12.3 07/06/2022    HCT 39.1 07/06/2022    MCV 92 07/06/2022     07/06/2022           Pre-op Assessment    I have reviewed the Patient Summary Reports.      I have reviewed the Medications.     Review of Systems  Anesthesia Hx:  No problems with previous Anesthesia   Denies Personal Hx of Anesthesia complications.   Hematology/Oncology:  Hematology Normal   Oncology Normal     EENT/Dental:EENT/Dental Normal   Cardiovascular:  Cardiovascular Normal Exercise tolerance: good     Pulmonary:   Asthma    Renal/:   Chronic Renal Disease    Hepatic/GI:  Hepatic/GI Normal    Musculoskeletal:   Arthritis     Neurological:   Neuromuscular Disease,    Endocrine:  Endocrine Normal    Dermatological:  Skin Normal    Psych:  Psychiatric Normal           Physical Exam  General:  Well nourished      Airway/Jaw/Neck:  Airway Findings: Mouth Opening: Normal   Tongue: Normal   General Airway Assessment: Adult Mallampati: II  Improves to I with phonation.  TM Distance: Normal, at least 6 cm         Eyes/Ears/Nose:  EYES/EARS/NOSE FINDINGS: Normal   Dental:  DENTAL FINDINGS: Normal   Chest/Lungs:  Chest/Lungs Clear    Heart/Vascular:  Heart Findings: Normal Heart murmur: negative Vascular Findings: Normal    Abdomen:  Abdomen Findings: Normal    Musculoskeletal:  Musculoskeletal Findings: Normal   Skin:  Skin Findings: Normal    Mental Status:  Mental Status Findings: Normal        Anesthesia Plan  Type of Anesthesia, risks & benefits discussed:  Anesthesia Type:  general    Patient's Preference:  General  Plan Factors:          Intra-op Monitoring Plan: standard ASA monitors  Intra-op Monitoring Plan Comments:   Post Op Pain Control Plan:   Post Op Pain Control Plan Comments:     Induction:   IV  Beta Blocker:  Patient is not currently on a Beta-Blocker (No further documentation required).       Informed Consent: Informed consent signed with the Patient and all parties understand the risks and agree with anesthesia plan.  All questions answered.  Anesthesia consent signed with patient.  ASA Score: 2     Day of Surgery Review of History & Physical:              Ready For Surgery From Anesthesia Perspective.           Physical Exam  General: Well nourished    Airway:  Mallampati: II / I  Mouth Opening: Normal  TM Distance: Normal, at least 6 cm  Tongue: Normal          Anesthesia Plan  Type of Anesthesia, risks & benefits discussed:    Anesthesia Type: general  Intra-op Monitoring Plan: standard ASA monitors  Induction:  IV  Informed Consent: Informed consent signed with the Patient and all parties understand the risks and agree with anesthesia plan.  All questions answered.   ASA Score: 2    Ready For Surgery From Anesthesia Perspective.       .

## 2023-06-02 NOTE — ANESTHESIA POSTPROCEDURE EVALUATION
Anesthesia Post Evaluation    Patient: Jeannie Saavedra    Procedure(s) Performed: Procedure(s) (LRB):  COLONOSCOPY (N/A)    Final Anesthesia Type: general      Patient location during evaluation: GI PACU  Patient participation: Yes- Able to Participate  Level of consciousness: awake and alert  Post-procedure vital signs: reviewed and stable  Pain management: adequate  Airway patency: patent    PONV status at discharge: No PONV  Anesthetic complications: no      Cardiovascular status: blood pressure returned to baseline  Respiratory status: spontaneous ventilation  Hydration status: euvolemic  Follow-up not needed.          Vitals Value Taken Time   /72 06/02/23 1114   Temp 36.4 °C (97.5 °F) 06/02/23 1045   Pulse 69 06/02/23 1114   Resp 16 06/02/23 1114   SpO2 100 % 06/02/23 1114         Event Time   Out of Recovery 11:22:59         Pain/Randy Score: Randy Score: 9 (6/2/2023 10:45 AM)

## 2023-06-02 NOTE — TRANSFER OF CARE
Anesthesia Transfer of Care Note    Patient: Jeannie Saavedra    Procedure(s) Performed: Procedure(s) (LRB):  COLONOSCOPY (N/A)    Patient location: GI    Anesthesia Type: general    Transport from OR: Transported from OR on room air with adequate spontaneous ventilation    Post pain: adequate analgesia    Post assessment: no apparent anesthetic complications    Post vital signs: stable    Level of consciousness: awake    Nausea/Vomiting: no nausea/vomiting    Complications: none    Transfer of care protocol was followed      Last vitals:   Visit Vitals  /60 (BP Location: Left arm, Patient Position: Lying)   Pulse 81   Temp 36.5 °C (97.7 °F) (Temporal)   Resp 18   Ht 5' (1.524 m)   Wt 81.6 kg (180 lb)   SpO2 99%   Breastfeeding No   BMI 35.15 kg/m²

## 2023-06-02 NOTE — PROVATION PATIENT INSTRUCTIONS
Discharge Summary/Instructions after an Endoscopic Procedure  Patient Name: Jeannie Saavedra  Patient MRN: [id number]  Patient YOB: 1965 Friday, June 2, 2023  Cali Ramos MD  Dear patient,  As a result of recent federal legislation (The Federal Cures Act), you may   receive lab or pathology results from your procedure in your [facility]   account before your physician is able to contact you. Your physician or   their representative will relay the results to you with their   recommendations at their soonest availability.  Thank you,  RESTRICTIONS:  During your procedure today, you received medications for sedation.  These   medications may affect your judgment, balance and coordination.  Therefore,   for 24 hours, you have the following restrictions:   - DO NOT drive a car, operate machinery, make legal/financial decisions,   sign important papers or drink alcohol.    ACTIVITY:  Today: no heavy lifting, straining or running due to procedural   sedation/anesthesia.  The following day: return to full activity including work.  DIET:  Eat and drink normally unless instructed otherwise.     TREATMENT FOR COMMON SIDE EFFECTS:  - Mild abdominal pain, nausea, belching, bloating or excessive gas:  rest,   eat lightly and use a heating pad.  - Sore Throat: treat with throat lozenges and/or gargle with warm salt   water.  - Because air was used during the procedure, expelling large amounts of air   from your rectum or belching is normal.  - If a bowel prep was taken, you may not have a bowel movement for 1-3 days.    This is normal.  SYMPTOMS TO WATCH FOR AND REPORT TO YOUR PHYSICIAN:  1. Abdominal pain or bloating, other than gas cramps.  2. Chest pain.  3. Back pain.  4. Signs of infection such as: chills or fever occurring within 24 hours   after the procedure.  5. Rectal bleeding, which would show as bright red, maroon, or black stools.   (A tablespoon of blood from the rectum is not serious, especially  if   hemorrhoids are present.)  6. Vomiting.  7. Weakness or dizziness.  GO DIRECTLY TO THE NEAREST EMERGENCY ROOM IF YOU HAVE ANY OF THE FOLLOWING:      Difficulty breathing              Chills and/or fever over 101 F   Persistent vomiting and/or vomiting blood   Severe abdominal pain   Severe chest pain   Black, tarry stools   Bleeding- more than one tablespoon   Any other symptom or condition that you feel may need urgent attention  Your doctor recommends these additional instructions:  If any biopsies were taken, your doctors clinic will contact you in 1 to 2   weeks with any results.  - Discharge patient to home (ambulatory).   - Resume previous diet.   - Continue present medications.   - Repeat colonoscopy in 7 years for surveillance.  For questions, problems or results please call your physician - Cali Ramos MD at Work:  [phone].  [facility], EMERGENCY ROOM PHONE NUMBER: [phone]  IF A COMPLICATION OR EMERGENCY SITUATION ARISES AND YOU ARE UNABLE TO REACH   YOUR PHYSICIAN - GO DIRECTLY TO THE EMERGENCY ROOM.  Cali Ramos MD  6/2/2023 10:43:12 AM  This report has been verified and signed electronically.  Dear patient,  As a result of recent federal legislation (The Federal Cures Act), you may   receive lab or pathology results from your procedure in your [facility]   account before your physician is able to contact you. Your physician or   their representative will relay the results to you with their   recommendations at their soonest availability.  Thank you,  PROVATION

## 2023-06-02 NOTE — H&P
COLONOSCOPY HISTORY AND PHYSICAL EXAM    Procedure : Colonoscopy      INDICATIONS: personal history of colon polyps    Family Hx of CRC: maternal aunt with stage IV disease    Last Colonoscopy:  2018  Findings: TA x 1       Past Medical History:   Diagnosis Date    Allergy     Asthma     Keloid cicatrix     Nephrolithiasis     Osteoarthritis of lumbar spine     Sciatica      Sedation Problems: NO  Family History   Problem Relation Age of Onset    Heart disease Mother         cardiomyopathy, CAD.    Hyperlipidemia Mother     Hypertension Mother     Heart disease Father     Alcohol abuse Sister     Depression Sister     No Known Problems Brother     No Known Problems Daughter     Asthma Son     Cancer Maternal Aunt         colon    Cancer Paternal Grandmother         breast cancer    Breast cancer Paternal Grandmother     Melanoma Neg Hx     Acne Neg Hx     Lupus Neg Hx     Psoriasis Neg Hx     Eczema Neg Hx      Fam Hx of Sedation Problems: NO  Social History     Socioeconomic History    Marital status: Single   Occupational History     Employer: [employer]     Comment: [employer]   Tobacco Use    Smoking status: Never    Smokeless tobacco: Never   Substance and Sexual Activity    Alcohol use: Not Currently     Comment: rarely - special occasions only    Drug use: No   Social History Narrative    Walks 4 days a week, for 20-30 minutes.        Single.     Social Determinants of Health     Financial Resource Strain: Medium Risk    Difficulty of Paying Living Expenses: Somewhat hard   Food Insecurity: Food Insecurity Present    Worried About Running Out of Food in the Last Year: Sometimes true    Ran Out of Food in the Last Year: Sometimes true   Transportation Needs: No Transportation Needs    Lack of Transportation (Medical): No    Lack of Transportation (Non-Medical): No   Physical Activity: Sufficiently Active    Days of Exercise per Week: 5 days    Minutes of Exercise per Session: 60 min   Stress: No  Stress Concern Present    Feeling of Stress : Not at all   Social Connections: Unknown    Frequency of Communication with Friends and Family: Three times a week    Frequency of Social Gatherings with Friends and Family: Once a week    Active Member of Clubs or Organizations: No    Attends Club or Organization Meetings: Never    Marital Status: Patient refused   Housing Stability: Low Risk     Unable to Pay for Housing in the Last Year: No    Number of Places Lived in the Last Year: 1    Unstable Housing in the Last Year: No       Review of Systems - Negative except   Respiratory ROS: no dyspnea  Cardiovascular ROS: no exertional chest pain  Gastrointestinal ROS: NO abdominal discomfort,  NO rectal bleeding  Musculoskeletal ROS: no muscular pain  Neurological ROS: no recent stroke    Physical Exam:  /82 (BP Location: Left arm, Patient Position: Lying)   Pulse 81   Temp 97.7 °F (36.5 °C) (Temporal)   Resp 18   Ht 5' (1.524 m)   Wt 81.6 kg (180 lb)   SpO2 99%   Breastfeeding No   BMI 35.15 kg/m²   General: no distress  Head: normocephalic  Mallampati Score   Neck: supple, symmetrical, trachea midline  Lungs:  clear to auscultation bilaterally and normal respiratory effort  Heart: regular rate and rhythm and no murmur  Abdomen: soft, non-tender non-distented; bowel sounds normal; no masses,  no organomegaly  Extremities: no cyanosis or edema, or clubbing    ASA:  II    PLAN  COLONOSCOPY.    SedationPlan :MAC    The details of the procedure, the possible need for biopsy or polypectomy and the potential risks including bleeding, perforation, missed polyps were discussed in detail.

## 2023-06-29 ENCOUNTER — OCCUPATIONAL HEALTH (OUTPATIENT)
Dept: URGENT CARE | Facility: CLINIC | Age: 58
End: 2023-06-29

## 2023-06-29 DIAGNOSIS — Z02.89 ENCOUNTER FOR EXAMINATION REQUIRED BY DEPARTMENT OF TRANSPORTATION (DOT): Primary | ICD-10-CM

## 2023-06-29 PROCEDURE — 99499 UNLISTED E&M SERVICE: CPT | Mod: S$GLB,,, | Performed by: NURSE PRACTITIONER

## 2023-06-29 PROCEDURE — 99499 PHYSICAL, RECERT DOT/CDL: ICD-10-PCS | Mod: S$GLB,,, | Performed by: NURSE PRACTITIONER

## 2023-07-03 ENCOUNTER — PATIENT MESSAGE (OUTPATIENT)
Dept: INTERNAL MEDICINE | Facility: CLINIC | Age: 58
End: 2023-07-03

## 2023-07-03 ENCOUNTER — OFFICE VISIT (OUTPATIENT)
Dept: INTERNAL MEDICINE | Facility: CLINIC | Age: 58
End: 2023-07-03
Payer: COMMERCIAL

## 2023-07-03 ENCOUNTER — LAB VISIT (OUTPATIENT)
Dept: LAB | Facility: HOSPITAL | Age: 58
End: 2023-07-03
Attending: INTERNAL MEDICINE
Payer: COMMERCIAL

## 2023-07-03 VITALS
WEIGHT: 175.94 LBS | HEIGHT: 60 IN | SYSTOLIC BLOOD PRESSURE: 124 MMHG | HEART RATE: 88 BPM | DIASTOLIC BLOOD PRESSURE: 78 MMHG | BODY MASS INDEX: 34.54 KG/M2 | OXYGEN SATURATION: 98 %

## 2023-07-03 DIAGNOSIS — M54.42 CHRONIC LEFT-SIDED LOW BACK PAIN WITH LEFT-SIDED SCIATICA: ICD-10-CM

## 2023-07-03 DIAGNOSIS — M79.673 PAIN OF FOOT, UNSPECIFIED LATERALITY: ICD-10-CM

## 2023-07-03 DIAGNOSIS — G89.29 CHRONIC LEFT-SIDED LOW BACK PAIN WITH LEFT-SIDED SCIATICA: ICD-10-CM

## 2023-07-03 DIAGNOSIS — Z00.00 ANNUAL PHYSICAL EXAM: ICD-10-CM

## 2023-07-03 DIAGNOSIS — Z00.00 ANNUAL PHYSICAL EXAM: Primary | ICD-10-CM

## 2023-07-03 DIAGNOSIS — K59.00 CONSTIPATION, UNSPECIFIED CONSTIPATION TYPE: ICD-10-CM

## 2023-07-03 DIAGNOSIS — G47.00 INSOMNIA, UNSPECIFIED TYPE: ICD-10-CM

## 2023-07-03 PROBLEM — F43.23 ADJUSTMENT DISORDER WITH MIXED ANXIETY AND DEPRESSED MOOD: Status: RESOLVED | Noted: 2020-07-18 | Resolved: 2023-07-03

## 2023-07-03 PROBLEM — R07.9 CHEST PAIN OF UNKNOWN ETIOLOGY: Status: RESOLVED | Noted: 2021-05-10 | Resolved: 2023-07-03

## 2023-07-03 LAB
25(OH)D3+25(OH)D2 SERPL-MCNC: 59 NG/ML (ref 30–96)
ALBUMIN SERPL BCP-MCNC: 4.1 G/DL (ref 3.5–5.2)
ALP SERPL-CCNC: 63 U/L (ref 55–135)
ALT SERPL W/O P-5'-P-CCNC: 20 U/L (ref 10–44)
ANION GAP SERPL CALC-SCNC: 10 MMOL/L (ref 8–16)
AST SERPL-CCNC: 23 U/L (ref 10–40)
BILIRUB SERPL-MCNC: 0.4 MG/DL (ref 0.1–1)
BUN SERPL-MCNC: 15 MG/DL (ref 6–20)
CALCIUM SERPL-MCNC: 9.6 MG/DL (ref 8.7–10.5)
CHLORIDE SERPL-SCNC: 107 MMOL/L (ref 95–110)
CHOLEST SERPL-MCNC: 213 MG/DL (ref 120–199)
CHOLEST/HDLC SERPL: 3.3 {RATIO} (ref 2–5)
CO2 SERPL-SCNC: 27 MMOL/L (ref 23–29)
CREAT SERPL-MCNC: 0.8 MG/DL (ref 0.5–1.4)
CRP SERPL-MCNC: 7.8 MG/L (ref 0–8.2)
ERYTHROCYTE [DISTWIDTH] IN BLOOD BY AUTOMATED COUNT: 13.4 % (ref 11.5–14.5)
ERYTHROCYTE [SEDIMENTATION RATE] IN BLOOD BY PHOTOMETRIC METHOD: 12 MM/HR (ref 0–36)
EST. GFR  (NO RACE VARIABLE): >60 ML/MIN/1.73 M^2
ESTIMATED AVG GLUCOSE: 108 MG/DL (ref 68–131)
GLUCOSE SERPL-MCNC: 86 MG/DL (ref 70–110)
HBA1C MFR BLD: 5.4 % (ref 4–5.6)
HCT VFR BLD AUTO: 39.4 % (ref 37–48.5)
HDLC SERPL-MCNC: 64 MG/DL (ref 40–75)
HDLC SERPL: 30 % (ref 20–50)
HGB BLD-MCNC: 13.2 G/DL (ref 12–16)
LDLC SERPL CALC-MCNC: 138 MG/DL (ref 63–159)
MCH RBC QN AUTO: 29.4 PG (ref 27–31)
MCHC RBC AUTO-ENTMCNC: 33.5 G/DL (ref 32–36)
MCV RBC AUTO: 88 FL (ref 82–98)
NONHDLC SERPL-MCNC: 149 MG/DL
PLATELET # BLD AUTO: 365 K/UL (ref 150–450)
PMV BLD AUTO: 10.5 FL (ref 9.2–12.9)
POTASSIUM SERPL-SCNC: 4 MMOL/L (ref 3.5–5.1)
PROT SERPL-MCNC: 7.4 G/DL (ref 6–8.4)
RBC # BLD AUTO: 4.49 M/UL (ref 4–5.4)
RHEUMATOID FACT SERPL-ACNC: <13 IU/ML (ref 0–15)
SODIUM SERPL-SCNC: 144 MMOL/L (ref 136–145)
TRIGL SERPL-MCNC: 55 MG/DL (ref 30–150)
TSH SERPL DL<=0.005 MIU/L-ACNC: 0.55 UIU/ML (ref 0.4–4)
URATE SERPL-MCNC: 4.3 MG/DL (ref 2.4–5.7)
WBC # BLD AUTO: 4.05 K/UL (ref 3.9–12.7)

## 2023-07-03 PROCEDURE — 86140 C-REACTIVE PROTEIN: CPT | Performed by: INTERNAL MEDICINE

## 2023-07-03 PROCEDURE — 82306 VITAMIN D 25 HYDROXY: CPT | Performed by: INTERNAL MEDICINE

## 2023-07-03 PROCEDURE — 1160F RVW MEDS BY RX/DR IN RCRD: CPT | Mod: CPTII,S$GLB,, | Performed by: INTERNAL MEDICINE

## 2023-07-03 PROCEDURE — 3008F PR BODY MASS INDEX (BMI) DOCUMENTED: ICD-10-PCS | Mod: CPTII,S$GLB,, | Performed by: INTERNAL MEDICINE

## 2023-07-03 PROCEDURE — 36415 COLL VENOUS BLD VENIPUNCTURE: CPT | Performed by: INTERNAL MEDICINE

## 2023-07-03 PROCEDURE — 1159F PR MEDICATION LIST DOCUMENTED IN MEDICAL RECORD: ICD-10-PCS | Mod: CPTII,S$GLB,, | Performed by: INTERNAL MEDICINE

## 2023-07-03 PROCEDURE — 3074F SYST BP LT 130 MM HG: CPT | Mod: CPTII,S$GLB,, | Performed by: INTERNAL MEDICINE

## 2023-07-03 PROCEDURE — 3078F DIAST BP <80 MM HG: CPT | Mod: CPTII,S$GLB,, | Performed by: INTERNAL MEDICINE

## 2023-07-03 PROCEDURE — 3074F PR MOST RECENT SYSTOLIC BLOOD PRESSURE < 130 MM HG: ICD-10-PCS | Mod: CPTII,S$GLB,, | Performed by: INTERNAL MEDICINE

## 2023-07-03 PROCEDURE — 99396 PR PREVENTIVE VISIT,EST,40-64: ICD-10-PCS | Mod: S$GLB,,, | Performed by: INTERNAL MEDICINE

## 2023-07-03 PROCEDURE — 1160F PR REVIEW ALL MEDS BY PRESCRIBER/CLIN PHARMACIST DOCUMENTED: ICD-10-PCS | Mod: CPTII,S$GLB,, | Performed by: INTERNAL MEDICINE

## 2023-07-03 PROCEDURE — 3078F PR MOST RECENT DIASTOLIC BLOOD PRESSURE < 80 MM HG: ICD-10-PCS | Mod: CPTII,S$GLB,, | Performed by: INTERNAL MEDICINE

## 2023-07-03 PROCEDURE — 99999 PR PBB SHADOW E&M-EST. PATIENT-LVL IV: CPT | Mod: PBBFAC,,, | Performed by: INTERNAL MEDICINE

## 2023-07-03 PROCEDURE — 99396 PREV VISIT EST AGE 40-64: CPT | Mod: S$GLB,,, | Performed by: INTERNAL MEDICINE

## 2023-07-03 PROCEDURE — 86431 RHEUMATOID FACTOR QUANT: CPT | Performed by: INTERNAL MEDICINE

## 2023-07-03 PROCEDURE — 84550 ASSAY OF BLOOD/URIC ACID: CPT | Performed by: INTERNAL MEDICINE

## 2023-07-03 PROCEDURE — 1159F MED LIST DOCD IN RCRD: CPT | Mod: CPTII,S$GLB,, | Performed by: INTERNAL MEDICINE

## 2023-07-03 PROCEDURE — 3008F BODY MASS INDEX DOCD: CPT | Mod: CPTII,S$GLB,, | Performed by: INTERNAL MEDICINE

## 2023-07-03 PROCEDURE — 80061 LIPID PANEL: CPT | Performed by: INTERNAL MEDICINE

## 2023-07-03 PROCEDURE — 83036 HEMOGLOBIN GLYCOSYLATED A1C: CPT | Performed by: INTERNAL MEDICINE

## 2023-07-03 PROCEDURE — 99999 PR PBB SHADOW E&M-EST. PATIENT-LVL IV: ICD-10-PCS | Mod: PBBFAC,,, | Performed by: INTERNAL MEDICINE

## 2023-07-03 PROCEDURE — 84443 ASSAY THYROID STIM HORMONE: CPT | Performed by: INTERNAL MEDICINE

## 2023-07-03 PROCEDURE — 85027 COMPLETE CBC AUTOMATED: CPT | Performed by: INTERNAL MEDICINE

## 2023-07-03 PROCEDURE — 80053 COMPREHEN METABOLIC PANEL: CPT | Performed by: INTERNAL MEDICINE

## 2023-07-03 PROCEDURE — 85652 RBC SED RATE AUTOMATED: CPT | Performed by: INTERNAL MEDICINE

## 2023-07-03 RX ORDER — MELOXICAM 15 MG/1
15 TABLET ORAL DAILY
Qty: 30 TABLET | Refills: 2 | Status: SHIPPED | OUTPATIENT
Start: 2023-07-03 | End: 2023-11-30

## 2023-07-03 RX ORDER — TIZANIDINE 4 MG/1
4 TABLET ORAL EVERY 8 HOURS
Qty: 30 TABLET | Refills: 2 | Status: SHIPPED | OUTPATIENT
Start: 2023-07-03 | End: 2023-10-30

## 2023-07-03 RX ORDER — HYDROXYZINE HYDROCHLORIDE 25 MG/1
TABLET, FILM COATED ORAL
Qty: 60 TABLET | Refills: 2 | Status: SHIPPED | OUTPATIENT
Start: 2023-07-03 | End: 2024-02-12

## 2023-07-03 NOTE — PATIENT INSTRUCTIONS
Metamucil, Benefiber, fibercon tablets for constipation.    Magnesium tablet 400 mg for constipation, muscle cramps, headaches.    Hydroxyzine 1-2 tabs at night for sleep.  No NOT take with periactin.

## 2023-07-03 NOTE — PROGRESS NOTES
Subjective     Patient ID: Jeannie Saavedra is a 57 y.o. female.    Chief Complaint: PE/ Back Pain and Insomnia    HPI  R sciatica started this am, upon awakening..    She hasn't taken any analgesics.     L sciatica comes and goes.    Also with chronic back pain.    INsomnia persists.  Melatonin hepful.    She walks all day.     Denies depression, anxiety.    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  Work stress was worse past school year.    Occas headaches, for which she takes Fioricet.    CP has resolved.    Persistent wrist pain.      L hand is weak.  Wears brace at night  Gt toe and 3rd toe on r is tender and sometimes sore.  Persistent spasms of L foot.  ALso with plantar fasciitis on L, intermittently.    Gabapentin caused head ache, so she stopped it.    She has intentionally lost 10 lbs over last year.    Urinary symptoms much improved.  Stopping tea may have helped.    Occas constipation    Review of Systems   Constitutional:  Negative for activity change, fever and unexpected weight change.   HENT:  Negative for nasal congestion, hearing loss, postnasal drip, rhinorrhea and trouble swallowing.    Eyes:  Negative for discharge and visual disturbance.   Respiratory:  Negative for chest tightness, shortness of breath and wheezing.    Cardiovascular:  Negative for chest pain, palpitations and leg swelling.   Gastrointestinal:  Negative for abdominal pain, anal bleeding, blood in stool, constipation, diarrhea, nausea and vomiting.   Endocrine: Negative for polydipsia and polyuria.   Genitourinary:  Negative for difficulty urinating, dysuria, hematuria, menstrual problem and urgency.   Musculoskeletal:  Positive for back pain. Negative for arthralgias, joint swelling and neck pain.   Integumentary:  Negative for rash.   Neurological:  Negative for weakness and headaches.   Psychiatric/Behavioral:  Positive for sleep disturbance. Negative for confusion and dysphoric mood. The patient is not  nervous/anxious.         Objective     Physical Exam  Constitutional:       General: She is not in acute distress.     Appearance: She is well-developed.   HENT:      Head: Normocephalic and atraumatic.      Right Ear: External ear normal.      Left Ear: External ear normal.      Nose: Nose normal.   Eyes:      General: No scleral icterus.        Right eye: No discharge.         Left eye: No discharge.      Conjunctiva/sclera: Conjunctivae normal.      Pupils: Pupils are equal, round, and reactive to light.   Neck:      Thyroid: No thyromegaly.      Vascular: No JVD.   Cardiovascular:      Rate and Rhythm: Normal rate and regular rhythm.      Heart sounds: Normal heart sounds. No murmur heard.    No gallop.   Pulmonary:      Effort: Pulmonary effort is normal. No respiratory distress.      Breath sounds: Normal breath sounds. No wheezing or rales.   Abdominal:      General: Bowel sounds are normal. There is no distension.      Palpations: Abdomen is soft. There is no mass.      Tenderness: There is no abdominal tenderness. There is no guarding or rebound.   Musculoskeletal:         General: No tenderness. Normal range of motion.      Cervical back: Normal range of motion and neck supple.      Right lower leg: No edema.      Left lower leg: No edema.      Comments: R foot nontender; no swelling, erythema   Lymphadenopathy:      Cervical: No cervical adenopathy.   Skin:     General: Skin is warm and dry.      Findings: No rash.   Neurological:      Mental Status: She is alert and oriented to person, place, and time.      Cranial Nerves: No cranial nerve deficit.      Coordination: Coordination normal.   Psychiatric:         Behavior: Behavior normal.         Thought Content: Thought content normal.         Judgment: Judgment normal.          Assessment and Plan     1. Annual physical exam  -     Comprehensive Metabolic Panel; Future; Expected date: 07/03/2023  -     CBC Without Differential; Future; Expected date:  07/03/2023  -     TSH; Future; Expected date: 07/03/2023  -     Vitamin D; Future; Expected date: 07/03/2023  -     Lipid Panel; Future; Expected date: 07/03/2023  -     Sedimentation rate; Future; Expected date: 07/03/2023  -     C-REACTIVE PROTEIN; Future; Expected date: 07/03/2023  -     RHEUMATOID FACTOR; Future; Expected date: 07/03/2023  -     URIC ACID; Future; Expected date: 07/03/2023  -     Hemoglobin A1C; Future; Expected date: 07/03/2023    2. Pain of foot, unspecified laterality  -     Ambulatory referral/consult to Podiatry; Future; Expected date: 07/10/2023    3. Insomnia, unspecified type    4. Chronic left-sided low back pain with left-sided sciatica    5. BMI 34.0-34.9,adult    6. Constipation, unspecified constipation type    Other orders  -     hydrOXYzine HCL (ATARAX) 25 MG tablet; 1-2 tabs po q hs for sleep  Dispense: 60 tablet; Refill: 2  -     meloxicam (MOBIC) 15 MG tablet; Take 1 tablet (15 mg total) by mouth once daily.  Dispense: 30 tablet; Refill: 2  -     tiZANidine (ZANAFLEX) 4 MG tablet; Take 1 tablet (4 mg total) by mouth every 8 (eight) hours. for 10 days  Dispense: 30 tablet; Refill: 2             Covid declined.    PT not helpful in past  Exercise regularly.  Weight loss diet reviewed.         Follow up in about 1 year (around 7/3/2024) for PE.

## 2023-07-05 ENCOUNTER — PATIENT OUTREACH (OUTPATIENT)
Dept: ADMINISTRATIVE | Facility: HOSPITAL | Age: 58
End: 2023-07-05
Payer: COMMERCIAL

## 2023-07-05 ENCOUNTER — TELEPHONE (OUTPATIENT)
Dept: ADMINISTRATIVE | Facility: HOSPITAL | Age: 58
End: 2023-07-05
Payer: COMMERCIAL

## 2023-07-05 NOTE — PROGRESS NOTES
Health Maintenance Due   Topic Date Due    COVID-19 Vaccine (3 - Pfizer series) 01/29/2022    Mammogram  06/10/2023       HM updated. Triggered LINKS and Care everywhere. Reconciled immunizations. Requested mammogram record from [facility].      Tosin Polanco LPN   Clinical Care Coordinator  [facility]ness

## 2023-07-05 NOTE — LETTER
AUTHORIZATION FOR RELEASE OF   CONFIDENTIAL INFORMATION    Dear [facility] ,    We are seeing Jeannie Saavedra, date of birth 1965, in the clinic at [facility]. Jannette Salguero MD is the patient's PCP. Jeannie Saavedra has an outstanding lab/procedure at the time we reviewed her chart. In order to help keep her health information updated, she has authorized us to request the following medical record(s):        (  x)  MAMMOGRAM                                      (  )  COLONOSCOPY      (  )  PAP SMEAR                                          (  )  OUTSIDE LAB RESULTS     (  )  DEXA SCAN                                          (  )  EYE EXAM            (  )  FOOT EXAM                                          (  )  ENTIRE RECORD     (  )  OUTSIDE IMMUNIZATIONS                 (  )  _______________         Please fax records to [facility], Nona K Epstein, MD, [phone]     If you have any questions, please contact Tosin AVILA LPN at [phone].           Patient Name: Jeannie Saavedra  : 1965  Patient Phone #: [phone]

## 2023-07-07 ENCOUNTER — PATIENT MESSAGE (OUTPATIENT)
Dept: INTERNAL MEDICINE | Facility: CLINIC | Age: 58
End: 2023-07-07
Payer: COMMERCIAL

## 2023-07-17 ENCOUNTER — PATIENT OUTREACH (OUTPATIENT)
Dept: ADMINISTRATIVE | Facility: HOSPITAL | Age: 58
End: 2023-07-17
Payer: COMMERCIAL

## 2023-07-17 NOTE — PROGRESS NOTES
Health Maintenance Due   Topic Date Due    COVID-19 Vaccine (3 - Pfizer series) 01/29/2022       Mammogram record imported to chart. HM updated.     Tosin Polanco LPN   Clinical Care Coordinator  Primary Care [facility] Wellness

## 2023-07-19 ENCOUNTER — PATIENT MESSAGE (OUTPATIENT)
Dept: INTERNAL MEDICINE | Facility: CLINIC | Age: 58
End: 2023-07-19
Payer: COMMERCIAL

## 2023-07-19 ENCOUNTER — TELEPHONE (OUTPATIENT)
Dept: INTERNAL MEDICINE | Facility: CLINIC | Age: 58
End: 2023-07-19
Payer: COMMERCIAL

## 2023-07-19 RX ORDER — ESTRADIOL 0.5 MG/1
TABLET ORAL
Qty: 180 TABLET | Refills: 3 | Status: SHIPPED | OUTPATIENT
Start: 2023-07-19

## 2023-07-19 RX ORDER — ESTRADIOL 0.5 MG/1
1 TABLET ORAL DAILY
Qty: 180 TABLET | Refills: 0 | Status: CANCELLED | OUTPATIENT
Start: 2023-07-19

## 2023-07-19 NOTE — TELEPHONE ENCOUNTER
Ok- premarin refilled.  Not a good idea to stop and restart.  If she is going to restart - continue to take regularly.

## 2023-07-20 NOTE — TELEPHONE ENCOUNTER
Called and spoke to the pt. Informed her that she should take this medication continuously. Pt expressed understanding.

## 2023-08-07 RX ORDER — BUTALBITAL, ACETAMINOPHEN AND CAFFEINE 50; 325; 40 MG/1; MG/1; MG/1
TABLET ORAL
Qty: 30 TABLET | Refills: 0 | Status: SHIPPED | OUTPATIENT
Start: 2023-08-07 | End: 2023-10-30 | Stop reason: SDUPTHER

## 2023-08-07 NOTE — TELEPHONE ENCOUNTER
Refill Routing Note   Medication(s) are not appropriate for processing by [facility] for the following reason(s):      Medication outside of protocol    [facility] action(s):  Route Care Due:  None identified              Appointments  past 12m or future 3m with PCP    Date Provider   Last Visit   7/3/2023 Jannette Salguero MD   Next Visit   Visit date not found Jannette Salguero MD   ED visits in past 90 days: 0        Note composed:10:26 AM 08/07/2023

## 2023-08-07 NOTE — TELEPHONE ENCOUNTER
No care due was identified.  Health [facility] Embedded Care Due Messages. Reference number: [id number].   8/07/2023 10:16:23 AM CDT

## 2023-08-23 ENCOUNTER — PATIENT MESSAGE (OUTPATIENT)
Dept: INTERNAL MEDICINE | Facility: CLINIC | Age: 58
End: 2023-08-23
Payer: COMMERCIAL

## 2023-08-25 ENCOUNTER — OFFICE VISIT (OUTPATIENT)
Dept: INTERNAL MEDICINE | Facility: CLINIC | Age: 58
End: 2023-08-25
Payer: COMMERCIAL

## 2023-08-25 VITALS
HEIGHT: 60 IN | BODY MASS INDEX: 35.96 KG/M2 | SYSTOLIC BLOOD PRESSURE: 126 MMHG | DIASTOLIC BLOOD PRESSURE: 82 MMHG | OXYGEN SATURATION: 99 % | HEART RATE: 92 BPM | WEIGHT: 183.19 LBS

## 2023-08-25 DIAGNOSIS — A60.09 HERPES GENITALIS IN WOMEN: Primary | ICD-10-CM

## 2023-08-25 PROCEDURE — 3074F SYST BP LT 130 MM HG: CPT | Mod: CPTII,S$GLB,, | Performed by: INTERNAL MEDICINE

## 2023-08-25 PROCEDURE — 3044F PR MOST RECENT HEMOGLOBIN A1C LEVEL <7.0%: ICD-10-PCS | Mod: CPTII,S$GLB,, | Performed by: INTERNAL MEDICINE

## 2023-08-25 PROCEDURE — 99213 PR OFFICE/OUTPT VISIT, EST, LEVL III, 20-29 MIN: ICD-10-PCS | Mod: S$GLB,,, | Performed by: INTERNAL MEDICINE

## 2023-08-25 PROCEDURE — 99213 OFFICE O/P EST LOW 20 MIN: CPT | Mod: S$GLB,,, | Performed by: INTERNAL MEDICINE

## 2023-08-25 PROCEDURE — 1160F PR REVIEW ALL MEDS BY PRESCRIBER/CLIN PHARMACIST DOCUMENTED: ICD-10-PCS | Mod: CPTII,S$GLB,, | Performed by: INTERNAL MEDICINE

## 2023-08-25 PROCEDURE — 1160F RVW MEDS BY RX/DR IN RCRD: CPT | Mod: CPTII,S$GLB,, | Performed by: INTERNAL MEDICINE

## 2023-08-25 PROCEDURE — 99999 PR PBB SHADOW E&M-EST. PATIENT-LVL IV: ICD-10-PCS | Mod: PBBFAC,,, | Performed by: INTERNAL MEDICINE

## 2023-08-25 PROCEDURE — 3079F PR MOST RECENT DIASTOLIC BLOOD PRESSURE 80-89 MM HG: ICD-10-PCS | Mod: CPTII,S$GLB,, | Performed by: INTERNAL MEDICINE

## 2023-08-25 PROCEDURE — 1159F MED LIST DOCD IN RCRD: CPT | Mod: CPTII,S$GLB,, | Performed by: INTERNAL MEDICINE

## 2023-08-25 PROCEDURE — 3044F HG A1C LEVEL LT 7.0%: CPT | Mod: CPTII,S$GLB,, | Performed by: INTERNAL MEDICINE

## 2023-08-25 PROCEDURE — 3074F PR MOST RECENT SYSTOLIC BLOOD PRESSURE < 130 MM HG: ICD-10-PCS | Mod: CPTII,S$GLB,, | Performed by: INTERNAL MEDICINE

## 2023-08-25 PROCEDURE — 99999 PR PBB SHADOW E&M-EST. PATIENT-LVL IV: CPT | Mod: PBBFAC,,, | Performed by: INTERNAL MEDICINE

## 2023-08-25 PROCEDURE — 3008F BODY MASS INDEX DOCD: CPT | Mod: CPTII,S$GLB,, | Performed by: INTERNAL MEDICINE

## 2023-08-25 PROCEDURE — 1159F PR MEDICATION LIST DOCUMENTED IN MEDICAL RECORD: ICD-10-PCS | Mod: CPTII,S$GLB,, | Performed by: INTERNAL MEDICINE

## 2023-08-25 PROCEDURE — 3008F PR BODY MASS INDEX (BMI) DOCUMENTED: ICD-10-PCS | Mod: CPTII,S$GLB,, | Performed by: INTERNAL MEDICINE

## 2023-08-25 PROCEDURE — 3079F DIAST BP 80-89 MM HG: CPT | Mod: CPTII,S$GLB,, | Performed by: INTERNAL MEDICINE

## 2023-08-25 RX ORDER — VALACYCLOVIR HYDROCHLORIDE 500 MG/1
500 TABLET, FILM COATED ORAL 2 TIMES DAILY
Qty: 28 TABLET | Refills: 0 | Status: SHIPPED | OUTPATIENT
Start: 2023-08-25 | End: 2024-03-20 | Stop reason: SDUPTHER

## 2023-08-25 RX ORDER — HYDROCODONE BITARTRATE AND ACETAMINOPHEN 5; 325 MG/1; MG/1
1 TABLET ORAL EVERY 8 HOURS PRN
Qty: 30 TABLET | Refills: 0 | Status: SHIPPED | OUTPATIENT
Start: 2023-08-25 | End: 2024-02-12

## 2023-08-25 NOTE — PROGRESS NOTES
[facility]  Clinic Visit  2023    Chief Complaint:   Chief Complaint   Patient presents with    Herpes Zoster       Last seen: 7/3/2023    Subjective:      HPI  Jeannie Saavedra is a 57 y.o. female with multiple medical diagnoses as listed in the medical history and problem list that presents for urgent care visit for new rash. Ms. Saavedra states she first noticed the rash two days ago. Localizes rash to her R groin. Describes rash as cluster of blisters. Endorses significant discomfort but denies and shooting or burning pain. Denies any itching. No drainage from blisters. Denies any fevers, chills, fatigue, urinary symptoms. Expresses concern that rash may be shingles. Of note, endorses recent new sexual partner.         PAST MEDICAL HISTORY:  Past Medical History:   Diagnosis Date    Allergy     Asthma     Keloid cicatrix     Nephrolithiasis     Osteoarthritis of lumbar spine     Sciatica        PAST SURGICAL HISTORY:  Past Surgical History:   Procedure Laterality Date    CARPAL TUNNEL RELEASE Left 2020    Procedure: RELEASE, CARPAL TUNNEL-Left;  Surgeon: Fay Muniz MD;  Location: [facility];  Service: Orthopedics;  Laterality: Left;  General/ regional     SECTION      COLONOSCOPY N/A 2018    Procedure: COLONOSCOPY with SOFÍA in 4 weeks;  Surgeon: Cali Ramos MD;  Location: [facility] ([address] [place] [address]);  Service: Endoscopy;  Laterality: N/A;    COLONOSCOPY N/A 2023    Procedure: COLONOSCOPY;  Surgeon: JAX Ramos MD;  Location: [facility] ([facility] FLR);  Service: Endoscopy;  Laterality: N/A;  Sofía, Deandra preference, Standard prep, PEG instr portal -ml    HYSTERECTOMY      for hypermenorrhea    INJECTION OF STEROID Right 2020    Procedure: INJECTION, STEROID-right carpal tunnel;  Surgeon: Fay Muniz MD;  Location: [facility];  Service: Orthopedics;  Laterality: Right;  General/Regional       SOCIAL HISTORY:  Social History      Socioeconomic History    Marital status: Single   Occupational History     Employer: [employer]     Comment: katherine   Tobacco Use    Smoking status: Never    Smokeless tobacco: Never   Substance and Sexual Activity    Alcohol use: Not Currently     Comment: rarely - special occasions only    Drug use: No    Sexual activity: Yes     Partners: Male     Birth control/protection: Condom   Social History Narrative    Walks 4 days a week, for 20-30 minutes.        Single.     Social Determinants of Health     Financial Resource Strain: Medium Risk (8/24/2023)    Overall Financial Resource Strain (CARDIA)     Difficulty of Paying Living Expenses: Somewhat hard   Food Insecurity: Food Insecurity Present (8/24/2023)    Hunger Vital Sign     Worried About Running Out of Food in the Last Year: Sometimes true     Ran Out of Food in the Last Year: Sometimes true   Transportation Needs: No Transportation Needs (8/24/2023)    PRAPARE - Transportation     Lack of Transportation (Medical): No     Lack of Transportation (Non-Medical): No   Physical Activity: Sufficiently Active (8/24/2023)    Exercise Vital Sign     Days of Exercise per Week: 5 days     Minutes of Exercise per Session: 70 min   Stress: No Stress Concern Present (8/24/2023)    [employer] - Occupational Stress Questionnaire     Feeling of Stress : Only a little   Social Connections: Unknown (8/24/2023)    Social Connection and Isolation Panel [NHANES]     Frequency of Communication with Friends and Family: Once a week     Frequency of Social Gatherings with Friends and Family: Patient refused     Active Member of Clubs or Organizations: No     Attends Club or Organization Meetings: Never     Marital Status: Never    Housing Stability: High Risk (8/24/2023)    Housing Stability Vital Sign     Unable to Pay for Housing in the Last Year: Yes     Number of Places Lived in the Last Year: 1     Unstable Housing in  the Last Year: No       FAMILY HISTORY:  Family History   Problem Relation Age of Onset    Heart disease Mother         cardiomyopathy, CAD.    Hyperlipidemia Mother     Hypertension Mother     Heart disease Father     Alcohol abuse Sister     Depression Sister     No Known Problems Brother     No Known Problems Daughter     Asthma Son     Cancer Maternal Aunt         colon    Cancer Paternal Grandmother         breast cancer    Breast cancer Paternal Grandmother     Melanoma Neg Hx     Acne Neg Hx     Lupus Neg Hx     Psoriasis Neg Hx     Eczema Neg Hx        ALLERGIES AND MEDICATIONS: updated and reviewed.  Review of patient's allergies indicates:  No Known Allergies  Current Outpatient Medications   Medication Sig Dispense Refill    butalbital-acetaminophen-caffeine -40 mg (FIORICET, ESGIC) -40 mg per tablet TAKE 1 TO 2 TABLETS BY MOUTH EVERY 4 TO 6 HOURS AS NEEDED FOR PAIN 30 tablet 0    cyproheptadine (PERIACTIN) 4 mg tablet Take 1 tablet by mouth twice daily as needed 60 tablet 5    estradioL (ESTRACE) 0.5 MG tablet Take 2 tablets by mouth once daily 180 tablet 3    hydrOXYzine HCL (ATARAX) 25 MG tablet 1-2 tabs po q hs for sleep 60 tablet 2    meloxicam (MOBIC) 15 MG tablet Take 1 tablet (15 mg total) by mouth once daily. 30 tablet 2    HYDROcodone-acetaminophen (NORCO) 5-325 mg per tablet Take 1 tablet by mouth every 8 (eight) hours as needed for Pain. 30 tablet 0    multivitamin capsule Take 1 capsule by mouth once daily.      valACYclovir (VALTREX) 500 MG tablet Take 1 tablet (500 mg total) by mouth 2 (two) times daily. for 14 days 28 tablet 0     No current facility-administered medications for this visit.       ROS  Review of Systems   Constitutional:  Negative for chills, fatigue and fever.   HENT:  Negative for mouth sores.    Eyes: Negative.    Respiratory:  Negative for cough, chest tightness and shortness of breath.    Cardiovascular:  Negative for chest pain and palpitations.    Gastrointestinal:  Negative for abdominal pain, constipation, diarrhea, nausea, rectal pain and vomiting.   Genitourinary:  Positive for genital sores. Negative for difficulty urinating, dyspareunia, dysuria, frequency, vaginal bleeding and vaginal discharge.   Musculoskeletal:  Negative for arthralgias and myalgias.   Skin:  Positive for rash.        Cluster of blisters localized to R groin   Neurological:  Negative for dizziness, light-headedness and headaches.   Psychiatric/Behavioral: Negative.     All other systems reviewed and are negative.      Objective:     Vitals:    08/25/23 0958   BP: 126/82   Pulse: 92   SpO2: 99%   Weight: 83.1 kg (183 lb 3.2 oz)   Height: 5' (1.524 m)    Body mass index is 35.78 kg/m².  Weight: 83.1 kg (183 lb 3.2 oz)   Height: 5' (152.4 cm)     Physical Exam  Physical Exam  Vitals reviewed.   Constitutional:       General: She is not in acute distress.     Appearance: Normal appearance. She is not ill-appearing.      Comments: Appears uncomfortable/in pain   HENT:      Head: Normocephalic and atraumatic.      Nose: Nose normal.      Mouth/Throat:      Mouth: Mucous membranes are moist.      Pharynx: Oropharynx is clear.   Eyes:      Conjunctiva/sclera: Conjunctivae normal.   Cardiovascular:      Rate and Rhythm: Normal rate and regular rhythm.      Pulses: Normal pulses.      Heart sounds: Normal heart sounds.   Pulmonary:      Effort: Pulmonary effort is normal.      Breath sounds: Normal breath sounds.   Abdominal:      General: Bowel sounds are normal.      Palpations: Abdomen is soft.   Genitourinary:     Pubic Area: Rash present.       Musculoskeletal:         General: Normal range of motion.      Cervical back: Normal range of motion and neck supple.   Skin:     General: Skin is warm and dry.      Capillary Refill: Capillary refill takes less than 2 seconds.      Findings: Rash present.   Neurological:      General: No focal deficit present.      Mental Status: She is alert  and oriented to person, place, and time. Mental status is at baseline.   Psychiatric:         Mood and Affect: Mood normal.         Behavior: Behavior normal.         Health Maintenance         Date Due Completion Date    COVID-19 Vaccine (3 - Pfizer series) 01/29/2022 12/4/2021    Influenza Vaccine (1) 09/01/2023 9/16/2022    Mammogram 06/16/2024 6/16/2023    TETANUS VACCINE 07/23/2024 7/23/2014    Hemoglobin A1c (Diabetic Prevention Screening) 07/03/2026 7/3/2023    Lipid Panel 07/03/2028 7/3/2023    Colorectal Cancer Screening 06/02/2030 6/2/2023            Assessment and Plan:     Herpes genitalis in women  Rash examined in clinic - findings consistent with herpes genitalis.   Discussed transmission and treatment with patient.   Medication ordered - valacyclovir and norco for pain control.        Follow Up:   No follow-ups on file.        Rachel Castro, MS4    I hereby acknowledge that I am relying upon documentation authored by a medical student working under my supervision and further I hereby attest that I have verified the student documentation or findings by personally re-performing the physical exam and medical decision making activities of the Evaluation and Management service to be billed.    Only one cluster, so less likely zoster.  Favor HSV genitalis    Rtc prn    G Xuan Jansen MD MPH  Staff Internist

## 2023-09-11 ENCOUNTER — PATIENT MESSAGE (OUTPATIENT)
Dept: INTERNAL MEDICINE | Facility: CLINIC | Age: 58
End: 2023-09-11
Payer: COMMERCIAL

## 2023-09-11 NOTE — TELEPHONE ENCOUNTER
"Called to triage pt from portal message.Pt c/o 5/10 left sided chest pain onset last night. Pt denies radiating pain but said she feels like she is having "muscle spasms to her left shoulder" also. +belching Denies dyspnea, diaphoresis, fatigue. Pt states nothing makes the pain better or worse. Denies reflux sx. Pt states she did take TUMS with no relief. Explained that pt should go to ED for urgent evaluation. Pt states she will go if the pain continues but would rather know what her Dr recommends. Explained that I would forward to PCP but explained that my recommendation was that she should go to ED now, pt verbalized understating.   "

## 2023-10-02 ENCOUNTER — TELEPHONE (OUTPATIENT)
Dept: INTERNAL MEDICINE | Facility: CLINIC | Age: 58
End: 2023-10-02
Payer: COMMERCIAL

## 2023-10-02 NOTE — TELEPHONE ENCOUNTER
----- Message from Hiwot Reddy sent at 10/2/2023  3:52 PM CDT -----  Contact: [phone]    Patient: Jeannie Saavedra-    Reason: The patient is requesting a call back from the nurse to get assistance with scheduling an     appointment for tenderness in leg and vein concern.    Comments: Please call the patient back to advise.

## 2023-10-20 ENCOUNTER — PATIENT MESSAGE (OUTPATIENT)
Dept: INTERNAL MEDICINE | Facility: CLINIC | Age: 58
End: 2023-10-20
Payer: COMMERCIAL

## 2023-10-20 RX ORDER — CYPROHEPTADINE HYDROCHLORIDE 4 MG/1
4 TABLET ORAL 2 TIMES DAILY PRN
Qty: 60 TABLET | Refills: 5 | Status: SHIPPED | OUTPATIENT
Start: 2023-10-20

## 2023-10-28 ENCOUNTER — HOSPITAL ENCOUNTER (EMERGENCY)
Facility: HOSPITAL | Age: 58
Discharge: HOME | End: 2023-10-29
Attending: STUDENT IN AN ORGANIZED HEALTH CARE EDUCATION/TRAINING PROGRAM
Payer: COMMERCIAL

## 2023-10-28 DIAGNOSIS — R51.9 ACUTE INTRACTABLE HEADACHE, UNSPECIFIED HEADACHE TYPE: Primary | ICD-10-CM

## 2023-10-28 PROCEDURE — 96361 HYDRATE IV INFUSION ADD-ON: CPT

## 2023-10-28 PROCEDURE — 63600175 PHARM REV CODE 636 W HCPCS: Performed by: STUDENT IN AN ORGANIZED HEALTH CARE EDUCATION/TRAINING PROGRAM

## 2023-10-28 PROCEDURE — 99285 EMERGENCY DEPT VISIT HI MDM: CPT | Mod: 25

## 2023-10-28 PROCEDURE — 96375 TX/PRO/DX INJ NEW DRUG ADDON: CPT

## 2023-10-28 RX ORDER — ONDANSETRON 2 MG/ML
4 INJECTION INTRAMUSCULAR; INTRAVENOUS ONCE
Status: COMPLETED | OUTPATIENT
Start: 2023-10-28 | End: 2023-10-28

## 2023-10-28 RX ORDER — DIPHENHYDRAMINE HYDROCHLORIDE 50 MG/ML
25 INJECTION INTRAMUSCULAR; INTRAVENOUS
Status: COMPLETED | OUTPATIENT
Start: 2023-10-28 | End: 2023-10-28

## 2023-10-28 RX ORDER — PROCHLORPERAZINE MALEATE 5 MG
10 TABLET ORAL
Status: COMPLETED | OUTPATIENT
Start: 2023-10-28 | End: 2023-10-28

## 2023-10-28 RX ADMIN — PROCHLORPERAZINE MALEATE 10 MG: 5 TABLET ORAL at 11:10

## 2023-10-28 RX ADMIN — DIPHENHYDRAMINE HYDROCHLORIDE 25 MG: 50 INJECTION, SOLUTION INTRAMUSCULAR; INTRAVENOUS at 11:10

## 2023-10-28 RX ADMIN — ONDANSETRON 4 MG: 2 INJECTION INTRAMUSCULAR; INTRAVENOUS at 11:10

## 2023-10-28 RX ADMIN — SODIUM CHLORIDE, POTASSIUM CHLORIDE, SODIUM LACTATE AND CALCIUM CHLORIDE 1000 ML: 600; 310; 30; 20 INJECTION, SOLUTION INTRAVENOUS at 11:10

## 2023-10-28 NOTE — Clinical Note
"Jeannie Rosenthaljojo Saavedra was seen and treated in our emergency department on 10/28/2023.  She may return to work on 10/30/2023.       If you have any questions or concerns, please don't hesitate to call.      Gibran Diaz MD"

## 2023-10-29 ENCOUNTER — PATIENT MESSAGE (OUTPATIENT)
Dept: INTERNAL MEDICINE | Facility: CLINIC | Age: 58
End: 2023-10-29
Payer: COMMERCIAL

## 2023-10-29 VITALS
HEART RATE: 62 BPM | SYSTOLIC BLOOD PRESSURE: 143 MMHG | WEIGHT: 183.19 LBS | DIASTOLIC BLOOD PRESSURE: 74 MMHG | TEMPERATURE: 98 F | RESPIRATION RATE: 16 BRPM | OXYGEN SATURATION: 100 % | BODY MASS INDEX: 35.78 KG/M2

## 2023-10-29 LAB
ANION GAP SERPL CALC-SCNC: 12 MMOL/L (ref 8–16)
BASOPHILS # BLD AUTO: 0.02 K/UL (ref 0–0.2)
BASOPHILS NFR BLD: 0.4 % (ref 0–1.9)
BUN SERPL-MCNC: 15 MG/DL (ref 6–20)
CALCIUM SERPL-MCNC: 9.6 MG/DL (ref 8.7–10.5)
CHLORIDE SERPL-SCNC: 107 MMOL/L (ref 95–110)
CO2 SERPL-SCNC: 20 MMOL/L (ref 23–29)
CREAT SERPL-MCNC: 0.8 MG/DL (ref 0.5–1.4)
CRP SERPL-MCNC: 13.5 MG/L (ref 0–8.2)
DIFFERENTIAL METHOD: ABNORMAL
EOSINOPHIL # BLD AUTO: 0.1 K/UL (ref 0–0.5)
EOSINOPHIL NFR BLD: 1.6 % (ref 0–8)
ERYTHROCYTE [DISTWIDTH] IN BLOOD BY AUTOMATED COUNT: 13.1 % (ref 11.5–14.5)
ERYTHROCYTE [SEDIMENTATION RATE] IN BLOOD BY PHOTOMETRIC METHOD: 18 MM/HR (ref 0–36)
EST. GFR  (NO RACE VARIABLE): >60 ML/MIN/1.73 M^2
GLUCOSE SERPL-MCNC: 87 MG/DL (ref 70–110)
HCT VFR BLD AUTO: 40.2 % (ref 37–48.5)
HCV AB SERPL QL IA: NORMAL
HGB BLD-MCNC: 12.8 G/DL (ref 12–16)
HIV 1+2 AB+HIV1 P24 AG SERPL QL IA: NORMAL
IMM GRANULOCYTES # BLD AUTO: 0.01 K/UL (ref 0–0.04)
IMM GRANULOCYTES NFR BLD AUTO: 0.2 % (ref 0–0.5)
LYMPHOCYTES # BLD AUTO: 2.4 K/UL (ref 1–4.8)
LYMPHOCYTES NFR BLD: 47.4 % (ref 18–48)
MCH RBC QN AUTO: 28.1 PG (ref 27–31)
MCHC RBC AUTO-ENTMCNC: 31.8 G/DL (ref 32–36)
MCV RBC AUTO: 88 FL (ref 82–98)
MONOCYTES # BLD AUTO: 0.5 K/UL (ref 0.3–1)
MONOCYTES NFR BLD: 10.8 % (ref 4–15)
NEUTROPHILS # BLD AUTO: 2 K/UL (ref 1.8–7.7)
NEUTROPHILS NFR BLD: 39.6 % (ref 38–73)
NRBC BLD-RTO: 0 /100 WBC
PLATELET # BLD AUTO: 316 K/UL (ref 150–450)
PMV BLD AUTO: 10.9 FL (ref 9.2–12.9)
POTASSIUM SERPL-SCNC: 4.7 MMOL/L (ref 3.5–5.1)
RBC # BLD AUTO: 4.56 M/UL (ref 4–5.4)
SODIUM SERPL-SCNC: 139 MMOL/L (ref 136–145)
WBC # BLD AUTO: 5 K/UL (ref 3.9–12.7)

## 2023-10-29 PROCEDURE — 96365 THER/PROPH/DIAG IV INF INIT: CPT

## 2023-10-29 PROCEDURE — 63600175 PHARM REV CODE 636 W HCPCS: Performed by: STUDENT IN AN ORGANIZED HEALTH CARE EDUCATION/TRAINING PROGRAM

## 2023-10-29 PROCEDURE — 87389 HIV-1 AG W/HIV-1&-2 AB AG IA: CPT | Performed by: PHYSICIAN ASSISTANT

## 2023-10-29 PROCEDURE — 86803 HEPATITIS C AB TEST: CPT | Performed by: PHYSICIAN ASSISTANT

## 2023-10-29 PROCEDURE — 80048 BASIC METABOLIC PNL TOTAL CA: CPT | Performed by: STUDENT IN AN ORGANIZED HEALTH CARE EDUCATION/TRAINING PROGRAM

## 2023-10-29 PROCEDURE — 85025 COMPLETE CBC W/AUTO DIFF WBC: CPT | Performed by: STUDENT IN AN ORGANIZED HEALTH CARE EDUCATION/TRAINING PROGRAM

## 2023-10-29 PROCEDURE — 85652 RBC SED RATE AUTOMATED: CPT | Performed by: STUDENT IN AN ORGANIZED HEALTH CARE EDUCATION/TRAINING PROGRAM

## 2023-10-29 PROCEDURE — 86140 C-REACTIVE PROTEIN: CPT | Performed by: STUDENT IN AN ORGANIZED HEALTH CARE EDUCATION/TRAINING PROGRAM

## 2023-10-29 RX ORDER — METOCLOPRAMIDE HYDROCHLORIDE 5 MG/ML
10 INJECTION INTRAMUSCULAR; INTRAVENOUS
Status: COMPLETED | OUTPATIENT
Start: 2023-10-29 | End: 2023-10-29

## 2023-10-29 RX ORDER — MORPHINE SULFATE 4 MG/ML
4 INJECTION, SOLUTION INTRAMUSCULAR; INTRAVENOUS
Status: COMPLETED | OUTPATIENT
Start: 2023-10-29 | End: 2023-10-29

## 2023-10-29 RX ORDER — BUTALBITAL, ACETAMINOPHEN AND CAFFEINE 50; 325; 40 MG/1; MG/1; MG/1
1 TABLET ORAL EVERY 6 HOURS PRN
Qty: 6 TABLET | Refills: 0 | Status: SHIPPED | OUTPATIENT
Start: 2023-10-29 | End: 2023-11-03

## 2023-10-29 RX ORDER — CIPROFLOXACIN AND DEXAMETHASONE 3; 1 MG/ML; MG/ML
4 SUSPENSION/ DROPS AURICULAR (OTIC) 2 TIMES DAILY
Qty: 7.5 ML | Refills: 0 | Status: SHIPPED | OUTPATIENT
Start: 2023-10-29 | End: 2023-11-05

## 2023-10-29 RX ORDER — MAGNESIUM SULFATE HEPTAHYDRATE 40 MG/ML
2 INJECTION, SOLUTION INTRAVENOUS ONCE
Status: COMPLETED | OUTPATIENT
Start: 2023-10-29 | End: 2023-10-29

## 2023-10-29 RX ADMIN — MAGNESIUM SULFATE HEPTAHYDRATE 2 G: 40 INJECTION, SOLUTION INTRAVENOUS at 12:10

## 2023-10-29 RX ADMIN — METOCLOPRAMIDE 10 MG: 5 INJECTION, SOLUTION INTRAMUSCULAR; INTRAVENOUS at 12:10

## 2023-10-29 RX ADMIN — MORPHINE SULFATE 4 MG: 4 INJECTION INTRAVENOUS at 01:10

## 2023-10-29 NOTE — ED TRIAGE NOTES
"Jeannie Saavedra, a 57 y.o. female presents to the ED w/ complaint of headache since this am. States located at "top of head". States took her imitrex for her migraines and it got " a little better" denies light sensitivity/ vision changes/ other symptoms    Triage note:  Chief Complaint   Patient presents with    Headache     Right upper headache x 5 hours. No precipitating injury. No vision changes. + nausea.  No hx of similar symptoms.      Review of patient's allergies indicates:  No Known Allergies  Past Medical History:   Diagnosis Date    Allergy     Asthma     Keloid cicatrix     Nephrolithiasis     Osteoarthritis of lumbar spine     Sciatica       LOC: The patient is awake, alert, aware of environment with an appropriate affect. Oriented x4, speaking appropriately  APPEARANCE: Pt resting comfortably, in no acute distress, pt is clean and well groomed, clothing properly fastened  SKIN:The skin is warm and dry, color consistent with ethnicity, patient has normal skin turgor and moist mucus membranes, no bruising noted   RESPIRATORY:Airway is open and patent, respirations are spontaneous, patient has a normal effort and rate, no accessory muscle use noted.  CARDIAC: Normal rate and rhythm  ABDOMEN: Soft, non tender, non distended.  NEUROLOGIC: PERRLA, facial expression is symmetrical, patient moving all extremities spontaneously, normal sensation in all extremities when touched with a finger.  Follows all commands appropriately. Complains of pain located in top of head.  MUSCULOSKELETAL: Patient moving all extremities spontaneously, no obvious swelling or deformities noted.      "

## 2023-10-29 NOTE — TELEPHONE ENCOUNTER
No care due was identified.  Health [facility] Embedded Care Due Messages. Reference number: [id number].   10/29/2023 4:49:08 PM CDT

## 2023-10-29 NOTE — PROVIDER PROGRESS NOTES - EMERGENCY DEPT.
Encounter Date: 10/28/2023    ED Physician Progress Notes        Physician Note:   I received this patient in sign out from the previous team.  I have discussed the patient's history and presentation in the ED with Dr. Diaz  The patient is a 57 y.o. female who presented to the ED with Headache (Right upper headache x 5 hours. No precipitating injury. No vision changes. + nausea.  No hx of similar symptoms. )    Significant history and PE findings:  History of headache.  No vision changes had nausea earlier but resolved now.  Has been going on for about 5 hours prior to coming in.  History of using Fioricet for headaches.  No neuro deficits.  Significant diagnostic results:  Labs within normal limits, CT head normal  Pending studies and/or consultations:  Reassessment after additional medications  Disposition:  Will continue to monitor, update patient on results of testing and determine appropriate additional treatment for the duration of stay in ED.  Pertinent lab and imaging findings are below.  Tarun Cabral,  1:48 AM 10/29/2023    1:48 AM  At time of my assessment, the patient was pending labs and reassessment.  She just received magnesium.  She states her goal would be to have pain at about 6/10 and then she would feel like she would be good enough to go home.    2:38 AM  After reassessment, the patient is reporting improved discomfort.  She is requesting discharge home.  We discussed outpatient follow-up and return precautions and follow-up instructions.  I did give her a few tablets of Fioricet, but she will need to follow up with the primary care physician.    She is also requesting drops for ear.  Reportedly, prior physician said she had inflammation in the outer ear.  Some tenderness with pulling of the pinna.  Ciprodex prescribed and sent to pharmacy.

## 2023-10-29 NOTE — ED PROVIDER NOTES
Encounter Date: 10/28/2023       History     Chief Complaint   Patient presents with    Headache     Right upper headache x 5 hours. No precipitating injury. No vision changes. + nausea.  No hx of similar symptoms.      HPI    57-year-old female past medical history asthma who presents to the ER for evaluation of a headache.  Patient states that over the last 1-2 days she has developed an achy right frontal headache.  She takes Fioricet for occasional headaches, and has no relief from that.  Associated with some nausea, no vomiting.  No light sensitivity.  No inciting events that she can recall.  She denies any trauma, fevers, chills, neck pain, numbness, tingling, vision changes, chest pain, dyspnea, or any rash.        Review of patient's allergies indicates:  No Known Allergies  Past Medical History:   Diagnosis Date    Allergy     Asthma     Keloid cicatrix     Nephrolithiasis     Osteoarthritis of lumbar spine     Sciatica      Past Surgical History:   Procedure Laterality Date    CARPAL TUNNEL RELEASE Left 2020    Procedure: RELEASE, CARPAL TUNNEL-Left;  Surgeon: Fay Muniz MD;  Location: [facility];  Service: Orthopedics;  Laterality: Left;  General/ regional     SECTION      COLONOSCOPY N/A 2018    Procedure: COLONOSCOPY with SOFÍA in 4 weeks;  Surgeon: Cali Ramos MD;  Location: [facility] ([address] [place] [address]);  Service: Endoscopy;  Laterality: N/A;    COLONOSCOPY N/A 2023    Procedure: COLONOSCOPY;  Surgeon: JAX Ramos MD;  Location: [facility]O (4TH FLR);  Service: Endoscopy;  Laterality: N/A;  Sofía, Deandra preference, Standard prep, PEG instr portal -ml    HYSTERECTOMY      for hypermenorrhea    INJECTION OF STEROID Right 2020    Procedure: INJECTION, STEROID-right carpal tunnel;  Surgeon: Fay Muniz MD;  Location: [facility] OR;  Service: Orthopedics;  Laterality: Right;  General/Regional     Family History   Problem Relation Age of Onset    Heart disease  Mother         cardiomyopathy, CAD.    Hyperlipidemia Mother     Hypertension Mother     Heart disease Father     Alcohol abuse Sister     Depression Sister     No Known Problems Brother     No Known Problems Daughter     Asthma Son     Cancer Maternal Aunt         colon    Cancer Paternal Grandmother         breast cancer    Breast cancer Paternal Grandmother     Melanoma Neg Hx     Acne Neg Hx     Lupus Neg Hx     Psoriasis Neg Hx     Eczema Neg Hx      Social History     Tobacco Use    Smoking status: Never    Smokeless tobacco: Never   Substance Use Topics    Alcohol use: Not Currently     Comment: rarely - special occasions only    Drug use: No     Review of Systems   Constitutional:  Negative for fever.   Respiratory:  Negative for shortness of breath.    Cardiovascular:  Negative for chest pain.   Gastrointestinal:  Negative for nausea.   Musculoskeletal:  Negative for back pain.   Skin:  Negative for rash.   Neurological:  Positive for headaches. Negative for weakness.   Hematological:  Does not bruise/bleed easily.       Physical Exam     Initial Vitals [10/28/23 2152]   BP Pulse Resp Temp SpO2   (!) 150/96 79 12 98 °F (36.7 °C) 98 %      MAP       --         Physical Exam    Nursing note and vitals reviewed.  Constitutional: She appears well-developed and well-nourished.   HENT:   Head: Atraumatic.   Eyes: Conjunctivae and EOM are normal. Pupils are equal, round, and reactive to light.   Neck: Neck supple.   Normal range of motion.  Cardiovascular:  Normal rate and regular rhythm.           Pulmonary/Chest: Breath sounds normal. No respiratory distress.   Musculoskeletal:         General: No edema. Normal range of motion.      Cervical back: Normal range of motion and neck supple.     Neurological: She is alert and oriented to person, place, and time.   Skin: Skin is warm and dry.   Psychiatric: She has a normal mood and affect. Thought content normal.         ED Course   Procedures  Labs Reviewed   HIV 1  / 2 ANTIBODY   HEPATITIS C ANTIBODY   CBC W/ AUTO DIFFERENTIAL   BASIC METABOLIC PANEL   SEDIMENTATION RATE   C-REACTIVE PROTEIN          Imaging Results              CT Head Without Contrast (Final result)  Result time 10/28/23 23:21:35      Final result by Keesha Alvarez MD (10/28/23 23:21:35)                   Impression:      No acute intracranial abnormality detected.      Electronically signed by: Keesha Alvarez  Date:    10/28/2023  Time:    23:21               Narrative:    EXAMINATION:  CT OF THE HEAD WITHOUT    CLINICAL HISTORY:  Headache, sudden, severe;    TECHNIQUE:  5 mm unenhanced axial images were obtained from the skull base to the vertex.    COMPARISON:  None.    FINDINGS:  The ventricles, basal cisterns, and cortical sulci are within normal limits for patient's stated age. There is no acute intracranial hemorrhage, territorial infarct or mass effect, or midline shift. The visualized paranasal sinuses and mastoid air cells are clear.                                       Medications   morphine injection 4 mg (has no administration in time range)   prochlorperazine tablet 10 mg (10 mg Oral Given 10/28/23 2305)   diphenhydrAMINE injection 25 mg (25 mg Intravenous Given 10/28/23 2305)   lactated ringers bolus 1,000 mL (0 mLs Intravenous Stopped 10/29/23 0007)   ondansetron injection 4 mg (4 mg Intravenous Given 10/28/23 2305)   metoclopramide HCl injection 10 mg (10 mg Intravenous Given 10/29/23 0011)   magnesium sulfate 2g in water 50mL IVPB (premix) (0 g Intravenous Stopped 10/29/23 0102)     Medical Decision Making  57-year-old female presenting with a headache.  Vital signs stable in emergency room, normal neurologic exam.  No focal deficits.  CT brain was unremarkable.  She was given an initial headache cocktail of Compazine, Benadryl, and IV fluids.  On re-evaluation she was still having some pain.  Magnesium and Reglan were added on. She was again reassessed after this, reports no  relief.  Will add on basic labs, IV morphine.  Oncoming shift to follow and disposition.    Amount and/or Complexity of Data Reviewed  Labs: ordered.  Radiology: ordered and independent interpretation performed. Decision-making details documented in ED Course.    Risk  Prescription drug management.                               Clinical Impression:   Final diagnoses:  [R51.9] Acute intractable headache, unspecified headache type (Primary)               Gibran Diaz MD  10/29/23 0104

## 2023-10-30 ENCOUNTER — OFFICE VISIT (OUTPATIENT)
Dept: INTERNAL MEDICINE | Facility: CLINIC | Age: 58
End: 2023-10-30
Payer: COMMERCIAL

## 2023-10-30 VITALS
HEIGHT: 60 IN | WEIGHT: 181.56 LBS | OXYGEN SATURATION: 99 % | SYSTOLIC BLOOD PRESSURE: 144 MMHG | BODY MASS INDEX: 35.64 KG/M2 | DIASTOLIC BLOOD PRESSURE: 102 MMHG | HEART RATE: 72 BPM

## 2023-10-30 DIAGNOSIS — F43.9 SITUATIONAL STRESS: ICD-10-CM

## 2023-10-30 DIAGNOSIS — M25.569 KNEE PAIN, UNSPECIFIED CHRONICITY, UNSPECIFIED LATERALITY: ICD-10-CM

## 2023-10-30 DIAGNOSIS — G43.811 OTHER MIGRAINE WITH STATUS MIGRAINOSUS, INTRACTABLE: Primary | ICD-10-CM

## 2023-10-30 PROCEDURE — 3008F BODY MASS INDEX DOCD: CPT | Mod: CPTII,S$GLB,, | Performed by: INTERNAL MEDICINE

## 2023-10-30 PROCEDURE — 99999 PR PBB SHADOW E&M-EST. PATIENT-LVL IV: ICD-10-PCS | Mod: PBBFAC,,, | Performed by: INTERNAL MEDICINE

## 2023-10-30 PROCEDURE — 3044F PR MOST RECENT HEMOGLOBIN A1C LEVEL <7.0%: ICD-10-PCS | Mod: CPTII,S$GLB,, | Performed by: INTERNAL MEDICINE

## 2023-10-30 PROCEDURE — 99214 PR OFFICE/OUTPT VISIT, EST, LEVL IV, 30-39 MIN: ICD-10-PCS | Mod: 25,S$GLB,, | Performed by: INTERNAL MEDICINE

## 2023-10-30 PROCEDURE — 3080F PR MOST RECENT DIASTOLIC BLOOD PRESSURE >= 90 MM HG: ICD-10-PCS | Mod: CPTII,S$GLB,, | Performed by: INTERNAL MEDICINE

## 2023-10-30 PROCEDURE — 99999 PR PBB SHADOW E&M-EST. PATIENT-LVL IV: CPT | Mod: PBBFAC,,, | Performed by: INTERNAL MEDICINE

## 2023-10-30 PROCEDURE — 3008F PR BODY MASS INDEX (BMI) DOCUMENTED: ICD-10-PCS | Mod: CPTII,S$GLB,, | Performed by: INTERNAL MEDICINE

## 2023-10-30 PROCEDURE — 99214 OFFICE O/P EST MOD 30 MIN: CPT | Mod: 25,S$GLB,, | Performed by: INTERNAL MEDICINE

## 2023-10-30 PROCEDURE — 1159F PR MEDICATION LIST DOCUMENTED IN MEDICAL RECORD: ICD-10-PCS | Mod: CPTII,S$GLB,, | Performed by: INTERNAL MEDICINE

## 2023-10-30 PROCEDURE — 96372 THER/PROPH/DIAG INJ SC/IM: CPT | Mod: S$GLB,,, | Performed by: INTERNAL MEDICINE

## 2023-10-30 PROCEDURE — 1159F MED LIST DOCD IN RCRD: CPT | Mod: CPTII,S$GLB,, | Performed by: INTERNAL MEDICINE

## 2023-10-30 PROCEDURE — 3077F SYST BP >= 140 MM HG: CPT | Mod: CPTII,S$GLB,, | Performed by: INTERNAL MEDICINE

## 2023-10-30 PROCEDURE — 96372 PR INJECTION,THERAP/PROPH/DIAG2ST, IM OR SUBCUT: ICD-10-PCS | Mod: S$GLB,,, | Performed by: INTERNAL MEDICINE

## 2023-10-30 PROCEDURE — 3080F DIAST BP >= 90 MM HG: CPT | Mod: CPTII,S$GLB,, | Performed by: INTERNAL MEDICINE

## 2023-10-30 PROCEDURE — 3077F PR MOST RECENT SYSTOLIC BLOOD PRESSURE >= 140 MM HG: ICD-10-PCS | Mod: CPTII,S$GLB,, | Performed by: INTERNAL MEDICINE

## 2023-10-30 PROCEDURE — 3044F HG A1C LEVEL LT 7.0%: CPT | Mod: CPTII,S$GLB,, | Performed by: INTERNAL MEDICINE

## 2023-10-30 RX ORDER — TIZANIDINE 4 MG/1
TABLET ORAL
Qty: 30 TABLET | Refills: 0 | Status: SHIPPED | OUTPATIENT
Start: 2023-10-30 | End: 2024-01-07 | Stop reason: SDUPTHER

## 2023-10-30 RX ORDER — PREDNISONE 20 MG/1
TABLET ORAL
Qty: 6 TABLET | Refills: 0 | Status: SHIPPED | OUTPATIENT
Start: 2023-10-30 | End: 2024-02-12

## 2023-10-30 RX ORDER — KETOROLAC TROMETHAMINE 30 MG/ML
60 INJECTION, SOLUTION INTRAMUSCULAR; INTRAVENOUS
Status: COMPLETED | OUTPATIENT
Start: 2023-10-30 | End: 2023-10-30

## 2023-10-30 RX ORDER — SUMATRIPTAN 50 MG/1
TABLET, FILM COATED ORAL
Qty: 9 TABLET | Refills: 2 | Status: SHIPPED | OUTPATIENT
Start: 2023-10-30 | End: 2024-02-12

## 2023-10-30 RX ORDER — BUTALBITAL, ACETAMINOPHEN AND CAFFEINE 50; 325; 40 MG/1; MG/1; MG/1
TABLET ORAL
Qty: 30 TABLET | Refills: 0 | Status: SHIPPED | OUTPATIENT
Start: 2023-10-30 | End: 2024-01-07 | Stop reason: SDUPTHER

## 2023-10-30 RX ADMIN — KETOROLAC TROMETHAMINE 60 MG: 30 INJECTION, SOLUTION INTRAMUSCULAR; INTRAVENOUS at 04:10

## 2023-10-30 NOTE — TELEPHONE ENCOUNTER
Refill Routing Note   Medication(s) are not appropriate for processing by [facility] for the following reason(s):      Medication outside of protocol    [facility] action(s):  Route Care Due:  None identified            Appointments  past 12m or future 3m with PCP    Date Provider   Last Visit   7/3/2023 Jannette Salguero MD   Next Visit   10/29/2023 Jannette Salguero MD   ED visits in past 90 days: 1        Note composed:9:24 AM 10/30/2023

## 2023-10-30 NOTE — PROGRESS NOTES
Subjective     Patient ID: Jeannie Saavedra is a 57 y.o. female.    Chief Complaint: Headache    HPI  Headaches have worsened.   She was seen in ED 10/28 with severe headache.    She c/o severe pain on top of head.  T is a sharp intermittent pain.  Pain began that am., which intensified through the day.  Fioricet not helpful.    Nauseated in hospital, but no vomiting.  No photophobia.  Pain down from a 10-12 to about a 8.  Pain is similar to previous migraines , but more severe.  Neck is not stiff.    Not getting enough sleep.    Her job, as a , is very stressful.  Chronic insomnia.        CT of head ok.  Esr normal.  CRP 13.5    In past, headaches were milder, and easily relieved by Fioicet.  She has know h/o migraines.    Chart review:  maxalt not helpful. Imitrex neither, but she doesn't recall taking.        Knees have been painful.  L TKR and pian there is greater than on R.  Chronic l elbow pain, s/p decompression 11/20  Review of Systems   Neurological:  Positive for headaches.   Psychiatric/Behavioral:  Positive for sleep disturbance.           Objective     Physical Exam  Constitutional:       General: She is not in acute distress.     Appearance: She is well-developed. She is not ill-appearing, toxic-appearing or diaphoretic.   HENT:      Nose: No congestion (knee pain).   Eyes:      General: No scleral icterus.  Neck:      Thyroid: No thyromegaly.      Vascular: No JVD.   Cardiovascular:      Rate and Rhythm: Normal rate and regular rhythm.      Heart sounds: Normal heart sounds. No murmur heard.  Pulmonary:      Effort: Pulmonary effort is normal. No respiratory distress.      Breath sounds: Normal breath sounds. No stridor. No wheezing, rhonchi or rales.   Abdominal:      General: There is no distension.      Palpations: Abdomen is soft. There is no mass.      Tenderness: There is no abdominal tenderness. There is no guarding.      Hernia: No hernia is present.   Musculoskeletal:       Cervical back: No rigidity or tenderness.      Right lower leg: No edema.      Left lower leg: No edema.   Lymphadenopathy:      Cervical: No cervical adenopathy.   Neurological:      General: No focal deficit present.      Mental Status: She is alert and oriented to person, place, and time.      Cranial Nerves: No cranial nerve deficit.      Motor: No weakness.      Coordination: Coordination normal.      Gait: Gait normal.   Psychiatric:         Mood and Affect: Mood normal.         Behavior: Behavior normal.         Thought Content: Thought content normal.            Assessment and Plan     1. Other migraine with status migrainosus, intractable    2. Situational stress    3. Knee pain, unspecified chronicity, unspecified laterality    Other orders  -     predniSONE (DELTASONE) 20 MG tablet; 1 tab po bid x 3 days  Dispense: 6 tablet; Refill: 0  -     ketorolac injection 60 mg  -     sumatriptan (IMITREX) 50 MG tablet; 1 tab po q day at start of migraine headache.  Repeat once in 2 h if needed.  Dispense: 9 tablet; Refill: 2  -     butalbital-acetaminophen-caffeine -40 mg (FIORICET, ESGIC) -40 mg per tablet; TAKE 1 TO 2 TABLETS BY MOUTH EVERY 4 TO 6 HOURS AS NEEDED FOR PAIN  Dispense: 30 tablet; Refill: 0            Call if no better     We discussed Neuro consult       No follow-ups on file.

## 2023-10-30 NOTE — LETTER
October 30, 2023    Jeannie Saavedra  [address]  [place] [address]             Naveen Drea [facility]  Internal Medicine  [address]  [facility] [address]  Phone: [phone]  Fax: [phone]   October 30, 2023     Patient: Jeannie Saavedra   YOB: 1965   Date of Visit: 10/30/2023       To Whom it May Concern:    Jeannie Saavedra was seen in my clinic on 10/30/2023. She may return to work on 10/31/2023 .    Please excuse her from any work missed.    If you have any questions or concerns, please don't hesitate to call.    Sincerely,         Jannette Salguero MD

## 2023-11-03 ENCOUNTER — PATIENT MESSAGE (OUTPATIENT)
Dept: INTERNAL MEDICINE | Facility: CLINIC | Age: 58
End: 2023-11-03
Payer: COMMERCIAL

## 2023-11-03 DIAGNOSIS — R51.9 NONINTRACTABLE HEADACHE, UNSPECIFIED CHRONICITY PATTERN, UNSPECIFIED HEADACHE TYPE: Primary | ICD-10-CM

## 2023-11-06 ENCOUNTER — TELEPHONE (OUTPATIENT)
Dept: NEUROLOGY | Facility: CLINIC | Age: 58
End: 2023-11-06
Payer: COMMERCIAL

## 2023-11-06 NOTE — TELEPHONE ENCOUNTER
Called and spoke to pt. Pt had called in asking for another appointment with neurology any place but at [facility],  booked her in [place]. Pt did not want to come to [place], wants to keep appt at [facility] but that appt was already taken. Pt upset because she said she never asked to switch, pt navigator says she did request to switch. Offered to schedule pt and place her on a wait list, pt refused and said she would just go to another location.

## 2023-11-07 ENCOUNTER — TELEPHONE (OUTPATIENT)
Dept: NEUROLOGY | Facility: CLINIC | Age: 58
End: 2023-11-07
Payer: COMMERCIAL

## 2023-11-07 NOTE — TELEPHONE ENCOUNTER
----- Message from Magaly Daly sent at 11/7/2023  7:48 AM CST -----  Regarding: Nonintractable headache, unspecified chronicity pattern, unspecified headache ty...  Please assist with scheduling appointment. Pt has cancelled appointment multiple times     Nonintractable headache, unspecified chronicity pattern, unspecified headache ty...

## 2023-11-08 ENCOUNTER — PATIENT MESSAGE (OUTPATIENT)
Dept: NEUROLOGY | Facility: CLINIC | Age: 58
End: 2023-11-08
Payer: COMMERCIAL

## 2023-11-16 ENCOUNTER — TELEPHONE (OUTPATIENT)
Dept: NEUROLOGY | Facility: CLINIC | Age: 58
End: 2023-11-16
Payer: COMMERCIAL

## 2023-11-16 NOTE — TELEPHONE ENCOUNTER
----- Message from Devi Hawkins sent at 11/16/2023  4:55 PM CST -----  Regarding: Appt Access  Contact: [phone]  SCHEDULING/REQUEST    Appt Type:  NP    Date/Time Preference: n/a    Treating Provider: Ryder    Caller Name: ANGELA GRACE [[id number]]    Contact Preference: [phone]    Comments/Notes: NP requesting appt for headaches with Dr Soler only.  No appts avail.  Please call.

## 2023-11-25 ENCOUNTER — NURSE TRIAGE (OUTPATIENT)
Dept: ADMINISTRATIVE | Facility: CLINIC | Age: 58
End: 2023-11-25
Payer: COMMERCIAL

## 2023-11-25 NOTE — TELEPHONE ENCOUNTER
"Right shoulder pain that is present in arm as well. Worse w/ activity but present currently. Has gone to two [facility] w/ no success. Advised, per protocol. Verbalizes understanding, unsure she will follow dispo due to financial reasons.    Reason for Disposition   [1] Age > 40 AND [2] no obvious cause AND [3] pain even when not moving the arm  (Exception: Pain is clearly made worse by moving arm or bending neck.)    Additional Information   Negative: Passed out (i.e., lost consciousness, collapsed and was not responding)   Negative: Shock suspected (e.g., cold/pale/clammy skin, too weak to stand, low BP, rapid pulse)   Negative: [1] Similar pain previously AND [2] it was from "heart attack"   Negative: [1] Similar pain previously AND [2] it was from "angina" AND [3] not relieved by nitroglycerin   Negative: Sounds like a life-threatening emergency to the triager   Negative: [1] Pain lasting > 5 minutes AND [2] pain also present in chest  (Exception: Pain is clearly made worse by movement.)   Negative: Difficulty breathing or unusual sweating (e.g., sweating without exertion)    Protocols used: Shoulder Pain-A-AH    "

## 2023-11-30 ENCOUNTER — HOSPITAL ENCOUNTER (OUTPATIENT)
Dept: RADIOLOGY | Facility: HOSPITAL | Age: 58
Discharge: HOME | End: 2023-11-30
Attending: ORTHOPAEDIC SURGERY
Payer: COMMERCIAL

## 2023-11-30 ENCOUNTER — OFFICE VISIT (OUTPATIENT)
Dept: SPORTS MEDICINE | Facility: CLINIC | Age: 58
End: 2023-11-30
Payer: COMMERCIAL

## 2023-11-30 VITALS
WEIGHT: 183 LBS | HEIGHT: 60 IN | BODY MASS INDEX: 35.93 KG/M2 | HEART RATE: 91 BPM | DIASTOLIC BLOOD PRESSURE: 79 MMHG | SYSTOLIC BLOOD PRESSURE: 126 MMHG

## 2023-11-30 DIAGNOSIS — M25.511 RIGHT SHOULDER PAIN, UNSPECIFIED CHRONICITY: ICD-10-CM

## 2023-11-30 DIAGNOSIS — M25.511 ACUTE PAIN OF RIGHT SHOULDER: Primary | ICD-10-CM

## 2023-11-30 DIAGNOSIS — M75.21 BICEPS TENDONITIS, RIGHT: ICD-10-CM

## 2023-11-30 PROCEDURE — 99203 PR OFFICE/OUTPT VISIT, NEW, LEVL III, 30-44 MIN: ICD-10-PCS | Mod: 25,S$GLB,, | Performed by: ORTHOPAEDIC SURGERY

## 2023-11-30 PROCEDURE — 3078F PR MOST RECENT DIASTOLIC BLOOD PRESSURE < 80 MM HG: ICD-10-PCS | Mod: CPTII,S$GLB,, | Performed by: ORTHOPAEDIC SURGERY

## 2023-11-30 PROCEDURE — 1159F PR MEDICATION LIST DOCUMENTED IN MEDICAL RECORD: ICD-10-PCS | Mod: CPTII,S$GLB,, | Performed by: ORTHOPAEDIC SURGERY

## 2023-11-30 PROCEDURE — 73030 X-RAY EXAM OF SHOULDER: CPT | Mod: 26,RT,, | Performed by: RADIOLOGY

## 2023-11-30 PROCEDURE — 3008F BODY MASS INDEX DOCD: CPT | Mod: CPTII,S$GLB,, | Performed by: ORTHOPAEDIC SURGERY

## 2023-11-30 PROCEDURE — 99999 PR PBB SHADOW E&M-EST. PATIENT-LVL III: CPT | Mod: PBBFAC,,, | Performed by: ORTHOPAEDIC SURGERY

## 2023-11-30 PROCEDURE — 3074F SYST BP LT 130 MM HG: CPT | Mod: CPTII,S$GLB,, | Performed by: ORTHOPAEDIC SURGERY

## 2023-11-30 PROCEDURE — 1159F MED LIST DOCD IN RCRD: CPT | Mod: CPTII,S$GLB,, | Performed by: ORTHOPAEDIC SURGERY

## 2023-11-30 PROCEDURE — 73030 XR SHOULDER COMPLETE 2 OR MORE VIEWS RIGHT: ICD-10-PCS | Mod: 26,RT,, | Performed by: RADIOLOGY

## 2023-11-30 PROCEDURE — 3074F PR MOST RECENT SYSTOLIC BLOOD PRESSURE < 130 MM HG: ICD-10-PCS | Mod: CPTII,S$GLB,, | Performed by: ORTHOPAEDIC SURGERY

## 2023-11-30 PROCEDURE — 3008F PR BODY MASS INDEX (BMI) DOCUMENTED: ICD-10-PCS | Mod: CPTII,S$GLB,, | Performed by: ORTHOPAEDIC SURGERY

## 2023-11-30 PROCEDURE — 3044F PR MOST RECENT HEMOGLOBIN A1C LEVEL <7.0%: ICD-10-PCS | Mod: CPTII,S$GLB,, | Performed by: ORTHOPAEDIC SURGERY

## 2023-11-30 PROCEDURE — 20610 DRAIN/INJ JOINT/BURSA W/O US: CPT | Mod: RT,S$GLB,, | Performed by: ORTHOPAEDIC SURGERY

## 2023-11-30 PROCEDURE — 20610 LARGE JOINT ASPIRATION/INJECTION: R SUBACROMIAL BURSA: ICD-10-PCS | Mod: RT,S$GLB,, | Performed by: ORTHOPAEDIC SURGERY

## 2023-11-30 PROCEDURE — 99203 OFFICE O/P NEW LOW 30 MIN: CPT | Mod: 25,S$GLB,, | Performed by: ORTHOPAEDIC SURGERY

## 2023-11-30 PROCEDURE — 73030 X-RAY EXAM OF SHOULDER: CPT | Mod: TC,RT

## 2023-11-30 PROCEDURE — 3078F DIAST BP <80 MM HG: CPT | Mod: CPTII,S$GLB,, | Performed by: ORTHOPAEDIC SURGERY

## 2023-11-30 PROCEDURE — 99999 PR PBB SHADOW E&M-EST. PATIENT-LVL III: ICD-10-PCS | Mod: PBBFAC,,, | Performed by: ORTHOPAEDIC SURGERY

## 2023-11-30 PROCEDURE — 3044F HG A1C LEVEL LT 7.0%: CPT | Mod: CPTII,S$GLB,, | Performed by: ORTHOPAEDIC SURGERY

## 2023-11-30 RX ORDER — TRIAMCINOLONE ACETONIDE 40 MG/ML
40 INJECTION, SUSPENSION INTRA-ARTICULAR; INTRAMUSCULAR
Status: DISCONTINUED | OUTPATIENT
Start: 2023-11-30 | End: 2023-11-30 | Stop reason: HOSPADM

## 2023-11-30 RX ORDER — MELOXICAM 7.5 MG/1
7.5 TABLET ORAL DAILY
Qty: 30 TABLET | Refills: 3 | Status: SHIPPED | OUTPATIENT
Start: 2023-11-30

## 2023-11-30 RX ADMIN — TRIAMCINOLONE ACETONIDE 40 MG: 40 INJECTION, SUSPENSION INTRA-ARTICULAR; INTRAMUSCULAR at 09:11

## 2023-11-30 NOTE — PROGRESS NOTES
CC: RIGHT shoulder pain     57 y.o. Female with a 1 week history of right shoulder atraumatic pain. Patient reports no injury or change in activity.   Pain is noted along the bicep and bicep groove. The pain today is 5/10. She has tried NSAIDs and heat with minimal relief.   Is affecting ADLs.        Past Medical History:   Diagnosis Date    Allergy     Asthma     Keloid cicatrix     Nephrolithiasis     Osteoarthritis of lumbar spine     Sciatica        Past Surgical History:   Procedure Laterality Date    CARPAL TUNNEL RELEASE Left 2020    Procedure: RELEASE, CARPAL TUNNEL-Left;  Surgeon: Fay Muniz MD;  Location: [facility];  Service: Orthopedics;  Laterality: Left;  General/ regional     SECTION      COLONOSCOPY N/A 2018    Procedure: COLONOSCOPY with SOFÍA in 4 weeks;  Surgeon: Cali Ramos MD;  Location: [facility] (4TH FLR);  Service: Endoscopy;  Laterality: N/A;    COLONOSCOPY N/A 2023    Procedure: COLONOSCOPY;  Surgeon: JAX Ramos MD;  Location: [facility] (4TH FLR);  Service: Endoscopy;  Laterality: N/A;  Sofía, No preference, Standard prep, PEG instr portal -ml    HYSTERECTOMY      for hypermenorrhea    INJECTION OF STEROID Right 2020    Procedure: INJECTION, STEROID-right carpal tunnel;  Surgeon: Fay Muniz MD;  Location: [facility];  Service: Orthopedics;  Laterality: Right;  General/Regional       Family History   Problem Relation Age of Onset    Heart disease Mother         cardiomyopathy, CAD.    Hyperlipidemia Mother     Hypertension Mother     Heart disease Father     Alcohol abuse Sister     Depression Sister     No Known Problems Brother     No Known Problems Daughter     Asthma Son     Cancer Maternal Aunt         colon    Cancer Paternal Grandmother         breast cancer    Breast cancer Paternal Grandmother     Melanoma Neg Hx     Acne Neg Hx     Lupus Neg Hx     Psoriasis Neg Hx     Eczema Neg Hx          Current Outpatient  Medications:     butalbital-acetaminophen-caffeine -40 mg (FIORICET, ESGIC) -40 mg per tablet, TAKE 1 TO 2 TABLETS BY MOUTH EVERY 4 TO 6 HOURS AS NEEDED FOR PAIN, Disp: 30 tablet, Rfl: 0    cyproheptadine (PERIACTIN) 4 mg tablet, Take 1 tablet (4 mg total) by mouth 2 (two) times daily as needed., Disp: 60 tablet, Rfl: 5    estradioL (ESTRACE) 0.5 MG tablet, Take 2 tablets by mouth once daily, Disp: 180 tablet, Rfl: 3    tiZANidine (ZANAFLEX) 4 MG tablet, TAKE 1 TABLET BY MOUTH EVERY 8 HOURS FOR 10 DAYS, Disp: 30 tablet, Rfl: 0    HYDROcodone-acetaminophen (NORCO) 5-325 mg per tablet, Take 1 tablet by mouth every 8 (eight) hours as needed for Pain., Disp: 30 tablet, Rfl: 0    hydrOXYzine HCL (ATARAX) 25 MG tablet, 1-2 tabs po q hs for sleep, Disp: 60 tablet, Rfl: 2    meloxicam (MOBIC) 7.5 MG tablet, Take 1 tablet (7.5 mg total) by mouth once daily., Disp: 30 tablet, Rfl: 3    predniSONE (DELTASONE) 20 MG tablet, 1 tab po bid x 3 days, Disp: 6 tablet, Rfl: 0    sumatriptan (IMITREX) 50 MG tablet, 1 tab po q day at start of migraine headache.  Repeat once in 2 h if needed., Disp: 9 tablet, Rfl: 2    valACYclovir (VALTREX) 500 MG tablet, Take 1 tablet (500 mg total) by mouth 2 (two) times daily. for 14 days, Disp: 28 tablet, Rfl: 0    Review of patient's allergies indicates:  No Known Allergies       REVIEW OF SYSTEMS:  Constitution: Negative. Negative for chills, fever and night sweats.   HENT: Negative for congestion and headaches.    Eyes: Negative for blurred vision, left vision loss and right vision loss.   Cardiovascular: Negative for chest pain and syncope.   Respiratory: Negative for cough and shortness of breath.    Endocrine: Negative for polydipsia, polyphagia and polyuria.   Hematologic/Lymphatic: Negative for bleeding problem. Does not bruise/bleed easily.   Skin: Negative for dry skin, itching and rash.   Musculoskeletal: Negative for falls.  Positive for right shoulder pain and muscle  weakness.   Gastrointestinal: Negative for abdominal pain and bowel incontinence.   Genitourinary: Negative for bladder incontinence and nocturia.   Neurological: Negative for disturbances in coordination, loss of balance and seizures.   Psychiatric/Behavioral: Negative for depression. The patient does not have insomnia.    Allergic/Immunologic: Negative for hives and persistent infections.      PHYSICAL EXAMINATION:  Vitals:  /79   Pulse 91   Ht 5' (1.524 m)   Wt 83 kg (182 lb 15.7 oz)   BMI 35.74 kg/m²    General: The patient is alert and oriented x 3.  Mood is pleasant.  Observation of ears, eyes and nose reveal no gross abnormalities.  No labored breathing observed.  Gait is coordinated. Patient can toe walk and heel walk without difficulty.      RIGHT SHOULDER / UPPER EXTREMITY EXAM    OBSERVATION:     Swelling  none  Deformity  none   Discoloration  none   Scapular winging none   Scars   none  Atrophy  none    TENDERNESS / CREPITUS (T/C):          T/C      T/C   Clavicle   -/-  SUPRAspinatus    -/-     AC Jt.    -/-  INFRAspinatus  -/-    SC Jt.    -/-  Deltoid    -/-      G. Tuberosity  -/-  LH BICEP groove  +/-   Acromion:  -/-  Midline Neck   -/-     Scapular Spine -/-  Trapezium   -/-   SMA Scapula  -/-  GH jt. line - post  -/-     Scapulothoracic  -/-         ROM: (* = with pain)  Left shoulder   Right shoulder        AROM (PROM)   AROM (PROM)   FE    170° (175°)     170° (175°)     ER at 0°    60°  (65°)    60°  (65°)   ER at 90° ABD  90°  (90°)    90°  (90°)   IR at 90°  ABD   NA  (40°)     NA  (40°)      IR (spine level)   T10     T10    STRENGTH: (* = with pain) Left shoulder   Right shoulder   SCAPTION   5/5    4+/5    IR    5/5    4+/5   ER    5/5    4+/5   BICEPS   5/5    4+/5   Deltoid    5/5    4+/5     SIGNS:  Painful side       NEER   +   OJOSES  neg    ZAVALA   +   SPEEDS  neg     DROP ARM   -   BELLY PRESS neg   Superior escape none    LIFT-OFF  neg   X-Body ADD    neg     MOVING VALGUS neg        STABILITY TESTING    Left shoulder   Right shoulder    Translation     Anterior  up face     up face    Posterior  up face    up face    Sulcus   < 10mm    < 10 mm     Signs   Apprehension   neg      neg       Relocation   no change     no change      Jerk test  neg     neg    EXTREMITY NEURO-VASCULAR EXAM:    Sensation grossly intact to light touch all dermatomal regions.    DTR 2+ Biceps, Triceps, BR and Negative Harrisons sign   Grossly intact motor function at Elbow, Wrist and Hand   Distal pulses radial and ulnar 2+, brisk cap refill, symmetric.      NECK:  Painless FROM and spinous processes non-tender. Negative Spurlings sign.      OTHER FINDINGS:      XRAYS:  Xrays including AP, Outlet and Axillary Lateral of shoulder are ordered / images reviewed by me:   No fracture dislocation or other pathology   Acromion type 1   Proximal migration of humeral head: None   GH arthritis: None          ASSESSMENT:   Right shoulder pain:  1. Acute pain of right shoulder    2. Biceps tendonitis, right        PLAN:      1. Mobic  2. CSI right shoulder   3. PT  4. F/u in 8 weeks.     All questions were answered, patient will contact us for questions or concerns in the interim.

## 2023-11-30 NOTE — PROCEDURES
Large Joint Aspiration/Injection: R subacromial bursa    Date/Time: 11/30/2023 9:15 AM    Performed by: Vinicio Brown MD  Authorized by: Vinicio Brown MD    Consent Done?:  Yes (Verbal)  Indications:  Pain  Site marked: the procedure site was marked    Timeout: prior to procedure the correct patient, procedure, and site was verified    Prep: patient was prepped and draped in usual sterile fashion    Local anesthesia used?: No      Details:  Needle Size:  22 G  Ultrasonic Guidance for needle placement?: No    Approach:  Posterior  Location:  Shoulder  Site:  R subacromial bursa  Medications:  40 mg triamcinolone acetonide 40 mg/mL  Patient tolerance:  Patient tolerated the procedure well with no immediate complications

## 2023-12-20 ENCOUNTER — TELEPHONE (OUTPATIENT)
Dept: NEUROLOGY | Facility: CLINIC | Age: 58
End: 2023-12-20
Payer: COMMERCIAL

## 2023-12-20 ENCOUNTER — PATIENT MESSAGE (OUTPATIENT)
Dept: INTERNAL MEDICINE | Facility: CLINIC | Age: 58
End: 2023-12-20
Payer: COMMERCIAL

## 2023-12-20 NOTE — TELEPHONE ENCOUNTER
"----- Message from Vanita Werner sent at 12/20/2023  2:04 PM CST -----  Regarding: Appointment  "Type:  Patient Call Back    Who Called:PT    What is the reqeust in detail:Requesting call back to schedule appointment. Please advise    Can the clinic reply by [facility]?no     Best Call Back Number:[phone]      Additional Information:            "

## 2023-12-20 NOTE — TELEPHONE ENCOUNTER
----- Message from Pratibha Baxter sent at 12/20/2023  4:11 PM CST -----  Type:  Patient Returning Call    Who Called: Pt   Who Left Message for Patient: Margaux   Does the patient know what this is regarding?: returning a call   Would the patient rather a call back or a response via [facility]? Call back   Best Call Back Number: [phone]

## 2023-12-20 NOTE — TELEPHONE ENCOUNTER
Called and left message on voicemail to return my call if she would like to schedule with Dr. Doe in July 2024.

## 2023-12-20 NOTE — TELEPHONE ENCOUNTER
Called and spoke to patient about scheduling an appointment with Dr. Doe. I scheduled patient for July 2, 2024 at 2:20 pm and put her on the wait list. Pt voiced understanding.

## 2024-01-08 RX ORDER — TIZANIDINE 4 MG/1
TABLET ORAL
Qty: 30 TABLET | Refills: 0 | Status: SHIPPED | OUTPATIENT
Start: 2024-01-08

## 2024-01-08 RX ORDER — BUTALBITAL, ACETAMINOPHEN AND CAFFEINE 50; 325; 40 MG/1; MG/1; MG/1
TABLET ORAL
Qty: 30 TABLET | Refills: 0 | Status: SHIPPED | OUTPATIENT
Start: 2024-01-08 | End: 2024-02-08

## 2024-01-08 NOTE — TELEPHONE ENCOUNTER
No care due was identified.  Health [facility] Embedded Care Due Messages. Reference number: [id number].   1/07/2024 10:44:52 PM CST

## 2024-01-08 NOTE — TELEPHONE ENCOUNTER
No care due was identified.  Health [facility] Embedded Care Due Messages. Reference number: [id number].   1/07/2024 10:44:28 PM CST

## 2024-02-07 RX ORDER — BUTALBITAL, ACETAMINOPHEN AND CAFFEINE 50; 325; 40 MG/1; MG/1; MG/1
TABLET ORAL
Qty: 30 TABLET | Refills: 0 | Status: CANCELLED | OUTPATIENT
Start: 2024-02-07

## 2024-02-08 ENCOUNTER — PATIENT MESSAGE (OUTPATIENT)
Dept: INTERNAL MEDICINE | Facility: CLINIC | Age: 59
End: 2024-02-08
Payer: COMMERCIAL

## 2024-02-08 RX ORDER — BUTALBITAL, ACETAMINOPHEN AND CAFFEINE 50; 325; 40 MG/1; MG/1; MG/1
TABLET ORAL
Qty: 24 TABLET | Refills: 0 | Status: SHIPPED | OUTPATIENT
Start: 2024-02-08

## 2024-02-08 NOTE — TELEPHONE ENCOUNTER
No care due was identified.  Health [facility] Embedded Care Due Messages. Reference number: [id number].   2/07/2024 6:31:01 PM CST

## 2024-02-08 NOTE — TELEPHONE ENCOUNTER
Refill Routing Note   Medication(s) are not appropriate for processing by [facility] for the following reason(s):        Outside of protocol    [facility] action(s):  Route               Appointments  past 12m or future 3m with PCP    Date Provider   Last Visit   10/30/2023 Jannette Salguero MD   Next Visit   2/7/2024 Jannette Salguero MD   ED visits in past 90 days: 0        Note composed:1:10 PM 02/08/2024

## 2024-02-08 NOTE — TELEPHONE ENCOUNTER
No care due was identified.  [facility] Embedded Care Due Messages. Reference number: [id number].   2/07/2024 6:43:48 PM CST

## 2024-02-12 ENCOUNTER — OFFICE VISIT (OUTPATIENT)
Dept: DERMATOLOGY | Facility: CLINIC | Age: 59
End: 2024-02-12
Payer: COMMERCIAL

## 2024-02-12 DIAGNOSIS — L81.1 MELASMA: Primary | ICD-10-CM

## 2024-02-12 DIAGNOSIS — L70.0 ACNE VULGARIS: ICD-10-CM

## 2024-02-12 PROCEDURE — 1159F MED LIST DOCD IN RCRD: CPT | Mod: CPTII,95,, | Performed by: DERMATOLOGY

## 2024-02-12 PROCEDURE — 99204 OFFICE O/P NEW MOD 45 MIN: CPT | Mod: 95,,, | Performed by: DERMATOLOGY

## 2024-02-12 PROCEDURE — 1160F RVW MEDS BY RX/DR IN RCRD: CPT | Mod: CPTII,95,, | Performed by: DERMATOLOGY

## 2024-02-12 RX ORDER — HYDROQUINONE 40 MG/G
CREAM TOPICAL
Qty: 28 G | Refills: 1 | Status: SHIPPED | OUTPATIENT
Start: 2024-02-12

## 2024-02-12 RX ORDER — TRETINOIN 0.25 MG/G
CREAM TOPICAL
Qty: 20 G | Refills: 6 | Status: SHIPPED | OUTPATIENT
Start: 2024-02-12 | End: 2025-02-11

## 2024-02-12 NOTE — PATIENT INSTRUCTIONS
"INSTRUCTIONS  Use hydroquinone bleaching cream to dark spots only each morning.  Use with a sunscreen with SPF 30.    Use tretinoin cream (pea-sized amount) to entire face at bedtime. May cause irritation, start using every third night and gradually increase to every night.  You may also apply a non-comedogenic moisturizer prior to applying the tretinoin to help reduce the risk of irritation and dryness.  Use a gentle cleanser twice daily such as CeraVe, Cetaphil, or Elta MD Gentle Foaming Cleanser.  Use CeraVe or Cetaphil lotion or Elta MD AM/PM therapy and use twice a day if dryness occurs.  All skin care products and cosmetics should have the label "non-comdeogenic" which means it will not clog your pores.      SHEER SUNSCREENS    Cetaphil Sheer Mineral Face Sunscreen SPF 50      "

## 2024-02-12 NOTE — PROGRESS NOTES
Subjective:      Patient ID:  Jeannie Saavedra is a 58 y.o. female who presents for No chief complaint on file.    The patient location is: work  The chief complaint leading to consultation is: discoloration    Visit type: audiovisual    Face to Face time with patient: 15 min  20 minutes of total time spent on the encounter, which includes face to face time and non-face to face time preparing to see the patient (eg, review of tests), Obtaining and/or reviewing separately obtained history, Documenting clinical information in the electronic or other health record, Independently interpreting results (not separately reported) and communicating results to the patient/family/caregiver, or Care coordination (not separately reported).         Each patient to whom he or she provides medical services by telemedicine is:  (1) informed of the relationship between the physician and patient and the respective role of any other health care provider with respect to management of the patient; and (2) notified that he or she may decline to receive medical services by telemedicine and may withdraw from such care at any time.    Notes:     Hx of acne, last seen by Dr. Leslie on 1/20/20.  Here today for new issue:    History of Present Illness: The patient presents with chief complaint of discoloration.  Location: face  Duration: 1 year  Signs/Symptoms: discoloration    Prior treatments: none    She was started on estradiol x 6 months          Review of Systems   Constitutional:  Negative for fever and chills.   Gastrointestinal:  Negative for nausea and vomiting.   Skin:  Positive for activity-related sunscreen use. Negative for daily sunscreen use and recent sunburn.   Hematologic/Lymphatic: Does not bruise/bleed easily.       Objective:   Physical Exam   Constitutional: She appears well-developed and well-nourished. No distress.   Neurological: She is alert and oriented to person, place, and time. She is not disoriented.    Psychiatric: She has a normal mood and affect.   Skin:   Areas Examined (abnormalities noted in diagram):   Head / Face Inspection Performed  Neck Inspection Performed  RUE Inspected  LUE Inspection Performed  Nails and Digits Inspection Performed                          Assessment / Plan:        Melasma  -     hydroquinone 4 % Crea; Apply to dark spots once daily. Use with sunscreen if outdoors  Dispense: 28 g; Refill: 1  -     discussed diagnosis and etiology.  will start HQ 6% each morning, discussed mineral based sunscreen, sun protection and sun avoidance.  Discussed relationship to HRT (estradiol), recommend pt discuss side effect with GYN.     Acne vulgaris  -     tretinoin (RETIN-A) 0.025 % cream; Apply pea-sized amount to entire face at bedtime.  If dryness, use every third night and increase as tolerated to every night.  Dispense: 20 g; Refill: 6  -     will start above med, recommend moisturizer prn.      Notified patient of common potential side effects such as dryness, redness, peeling, or mild skin irritation. The patient was counseled to use a pea-sized amount prior to bedtime on the entire face. Start medication every other night until the patient develops tolerance, then increase to nightly use. The patient was encouraged to use gentle emollients in conjunction with this medication and temporarily hold medication if severe skin irritation occurs. Pregnancy and breastfeeding must be avoided on retinoids. Patient voiced understanding and allowed to ask questions           Follow up in about 3 months (around 5/12/2024).

## 2024-02-16 ENCOUNTER — TELEPHONE (OUTPATIENT)
Dept: SPORTS MEDICINE | Facility: CLINIC | Age: 59
End: 2024-02-16
Payer: COMMERCIAL

## 2024-02-16 NOTE — TELEPHONE ENCOUNTER
Called patient and offered her 3/5/24 to see Dr. Brown. She declined----- Message from Nithya Russell sent at 2/16/2024 12:01 PM CST -----  Contact: [phone]  Pt states her right arm and shoulder are still causing her a lot of pain. She is requesting an appt to be seen today due to this but nothing is showing up available. Can you please call pt to discuss what she should do? Thanks

## 2024-02-26 ENCOUNTER — OFFICE VISIT (OUTPATIENT)
Dept: SPORTS MEDICINE | Facility: CLINIC | Age: 59
End: 2024-02-26
Payer: COMMERCIAL

## 2024-02-26 VITALS
HEART RATE: 93 BPM | WEIGHT: 180.56 LBS | DIASTOLIC BLOOD PRESSURE: 85 MMHG | BODY MASS INDEX: 35.45 KG/M2 | SYSTOLIC BLOOD PRESSURE: 131 MMHG | HEIGHT: 60 IN

## 2024-02-26 DIAGNOSIS — M12.811 RIGHT ROTATOR CUFF TEAR ARTHROPATHY: ICD-10-CM

## 2024-02-26 DIAGNOSIS — M25.511 ACUTE PAIN OF RIGHT SHOULDER: Primary | ICD-10-CM

## 2024-02-26 DIAGNOSIS — M75.101 RIGHT ROTATOR CUFF TEAR ARTHROPATHY: ICD-10-CM

## 2024-02-26 DIAGNOSIS — M75.21 BICEPS TENDONITIS, RIGHT: ICD-10-CM

## 2024-02-26 PROCEDURE — 1159F MED LIST DOCD IN RCRD: CPT | Mod: CPTII,S$GLB,, | Performed by: ORTHOPAEDIC SURGERY

## 2024-02-26 PROCEDURE — 3075F SYST BP GE 130 - 139MM HG: CPT | Mod: CPTII,S$GLB,, | Performed by: ORTHOPAEDIC SURGERY

## 2024-02-26 PROCEDURE — 3079F DIAST BP 80-89 MM HG: CPT | Mod: CPTII,S$GLB,, | Performed by: ORTHOPAEDIC SURGERY

## 2024-02-26 PROCEDURE — 99999 PR PBB SHADOW E&M-EST. PATIENT-LVL III: CPT | Mod: PBBFAC,,, | Performed by: ORTHOPAEDIC SURGERY

## 2024-02-26 PROCEDURE — 99214 OFFICE O/P EST MOD 30 MIN: CPT | Mod: 25,S$GLB,, | Performed by: ORTHOPAEDIC SURGERY

## 2024-02-26 PROCEDURE — 3008F BODY MASS INDEX DOCD: CPT | Mod: CPTII,S$GLB,, | Performed by: ORTHOPAEDIC SURGERY

## 2024-02-26 PROCEDURE — 20610 DRAIN/INJ JOINT/BURSA W/O US: CPT | Mod: RT,S$GLB,, | Performed by: ORTHOPAEDIC SURGERY

## 2024-02-26 RX ORDER — TRIAMCINOLONE ACETONIDE 40 MG/ML
40 INJECTION, SUSPENSION INTRA-ARTICULAR; INTRAMUSCULAR
Status: DISCONTINUED | OUTPATIENT
Start: 2024-02-26 | End: 2024-02-26 | Stop reason: HOSPADM

## 2024-02-26 RX ADMIN — TRIAMCINOLONE ACETONIDE 40 MG: 40 INJECTION, SUSPENSION INTRA-ARTICULAR; INTRAMUSCULAR at 10:02

## 2024-02-26 NOTE — PROCEDURES
Large Joint Aspiration/Injection: R subacromial bursa    Date/Time: 2/26/2024 10:00 AM    Performed by: Vinicio Brown MD  Authorized by: Vinicio Brown MD    Consent Done?:  Yes (Verbal)  Indications:  Pain  Site marked: the procedure site was marked    Timeout: prior to procedure the correct patient, procedure, and site was verified    Prep: patient was prepped and draped in usual sterile fashion    Local anesthesia used?: No      Details:  Needle Size:  22 G  Ultrasonic Guidance for needle placement?: No    Approach:  Posterior  Location:  Shoulder  Site:  R subacromial bursa  Medications:  40 mg triamcinolone acetonide 40 mg/mL  Patient tolerance:  Patient tolerated the procedure well with no immediate complications  
No

## 2024-02-26 NOTE — PROGRESS NOTES
CC: RIGHT shoulder pain    Patient returns to clinic for follow up of her right shoulder. She received a CSI last visit and notes minimal relief for about 2 weeks. She reports trying mobic with minimal relief. Patient has not tried PT.   She reports 7/10 pain with no change in history or new injury. She reports pain at night.       Initial Hx (23):    58 y.o. Female with a 1 week history of right shoulder atraumatic pain. Patient reports no injury or change in activity.   Pain is noted along the bicep and bicep groove. The pain today is 5/10. She has tried NSAIDs and heat with minimal relief.   Is affecting ADLs.        Past Medical History:   Diagnosis Date    Allergy     Asthma     Keloid cicatrix     Nephrolithiasis     Osteoarthritis of lumbar spine     Sciatica        Past Surgical History:   Procedure Laterality Date    CARPAL TUNNEL RELEASE Left 2020    Procedure: RELEASE, CARPAL TUNNEL-Left;  Surgeon: Fay Muniz MD;  [facility];  Service: Orthopedics;  Laterality: Left;  General/ regional     SECTION      COLONOSCOPY N/A 2018    Procedure: COLONOSCOPY with SOFÍA in 4 weeks;  Surgeon: Cali Ramos MD;  Location: [facility] (4TH FLR);  Service: Endoscopy;  Laterality: N/A;    COLONOSCOPY N/A 2023    Procedure: COLONOSCOPY;  Surgeon: JAX Ramos MD;  Location: [facility] (4TH FLR);  Service: Endoscopy;  Laterality: N/A;  Sofía, Deandra preference, Standard prep, PEG instr portal -ml    HYSTERECTOMY      for hypermenorrhea    INJECTION OF STEROID Right 2020    Procedure: INJECTION, STEROID-right carpal tunnel;  Surgeon: Fay Muniz MD;  [facility]: [facility];  Service: Orthopedics;  Laterality: Right;  General/Regional       Family History   Problem Relation Age of Onset    Heart disease Mother         cardiomyopathy, CAD.    Hyperlipidemia Mother     Hypertension Mother     Heart disease Father     Alcohol abuse Sister     Depression  Sister     No Known Problems Brother     No Known Problems Daughter     Asthma Son     Cancer Maternal Aunt         colon    Cancer Paternal Grandmother         breast cancer    Breast cancer Paternal Grandmother     Melanoma Neg Hx     Acne Neg Hx     Lupus Neg Hx     Psoriasis Neg Hx     Eczema Neg Hx          Current Outpatient Medications:     butalbital-acetaminophen-caffeine -40 mg (FIORICET, ESGIC) -40 mg per tablet, TAKE 1 TO 2 TABLETS BY MOUTH EVERY 4 TO 6 HOURS AS NEEDED FOR PAIN, Disp: 24 tablet, Rfl: 0    cyproheptadine (PERIACTIN) 4 mg tablet, Take 1 tablet (4 mg total) by mouth 2 (two) times daily as needed., Disp: 60 tablet, Rfl: 5    estradioL (ESTRACE) 0.5 MG tablet, Take 2 tablets by mouth once daily, Disp: 180 tablet, Rfl: 3    hydroquinone 4 % Crea, Apply to dark spots once daily. Use with sunscreen if outdoors, Disp: 28 g, Rfl: 1    tiZANidine (ZANAFLEX) 4 MG tablet, TAKE 1 TABLET BY MOUTH EVERY 8 HOURS FOR 10 DAYS, Disp: 30 tablet, Rfl: 0    tretinoin (RETIN-A) 0.025 % cream, Apply pea-sized amount to entire face at bedtime.  If dryness, use every third night and increase as tolerated to every night., Disp: 20 g, Rfl: 6    meloxicam (MOBIC) 7.5 MG tablet, Take 1 tablet (7.5 mg total) by mouth once daily. (Patient not taking: Reported on 2/26/2024), Disp: 30 tablet, Rfl: 3    valACYclovir (VALTREX) 500 MG tablet, Take 1 tablet (500 mg total) by mouth 2 (two) times daily. for 14 days, Disp: 28 tablet, Rfl: 0    Review of patient's allergies indicates:  No Known Allergies       REVIEW OF SYSTEMS:  Constitution: Negative. Negative for chills, fever and night sweats.   HENT: Negative for congestion and headaches.    Eyes: Negative for blurred vision, left vision loss and right vision loss.   Cardiovascular: Negative for chest pain and syncope.   Respiratory: Negative for cough and shortness of breath.    Endocrine: Negative for polydipsia, polyphagia and polyuria.    Hematologic/Lymphatic: Negative for bleeding problem. Does not bruise/bleed easily.   Skin: Negative for dry skin, itching and rash.   Musculoskeletal: Negative for falls.  Positive for right shoulder pain and muscle weakness.   Gastrointestinal: Negative for abdominal pain and bowel incontinence.   Genitourinary: Negative for bladder incontinence and nocturia.   Neurological: Negative for disturbances in coordination, loss of balance and seizures.   Psychiatric/Behavioral: Negative for depression. The patient does not have insomnia.    Allergic/Immunologic: Negative for hives and persistent infections.      PHYSICAL EXAMINATION:  Vitals:  /85   Pulse 93   Ht 5' (1.524 m)   Wt 81.9 kg (180 lb 8.9 oz)   BMI 35.26 kg/m²    General: The patient is alert and oriented x 3.  Mood is pleasant.  Observation of ears, eyes and nose reveal no gross abnormalities.  No labored breathing observed.  Gait is coordinated. Patient can toe walk and heel walk without difficulty.      RIGHT SHOULDER / UPPER EXTREMITY EXAM    OBSERVATION:     Swelling  none  Deformity  none   Discoloration  none   Scapular winging none   Scars   none  Atrophy  none    TENDERNESS / CREPITUS (T/C):          T/C      T/C   Clavicle   -/-  SUPRAspinatus    -/-     AC Jt.    -/-  INFRAspinatus  -/-    SC Jt.    -/-  Deltoid    -/-      G. Tuberosity  -/-  LH BICEP groove  +/-   Acromion:  -/-  Midline Neck   -/-     Scapular Spine -/-  Trapezium   -/-   SMA Scapula  -/-  GH jt. line - post  -/-     Scapulothoracic  -/-         ROM: (* = with pain)  Left shoulder   Right shoulder        AROM (PROM)   AROM (PROM)   FE    170° (175°)     170° (175°)     ER at 0°    60°  (65°)    60°  (65°)   ER at 90° ABD  90°  (90°)    90°  (90°)   IR at 90°  ABD   NA  (40°)     NA  (40°)      IR (spine level)   T10     T10    STRENGTH: (* = with pain) Left shoulder   Right  shoulder   SCAPTION   5/5    4+/5    IR    5/5    4+/5   ER    5/5    4+/5   BICEPS   5/5    4+/5   Deltoid    5/5    4+/5     SIGNS:  Painful side       NEER   +   OJOSES  neg    ZAVALA   +   SPEEDS  neg     DROP ARM   -   BELLY PRESS neg   Superior escape none    LIFT-OFF  neg   X-Body ADD    neg    MOVING VALGUS neg        STABILITY TESTING    Left shoulder   Right shoulder    Translation     Anterior  up face     up face    Posterior  up face    up face    Sulcus   < 10mm    < 10 mm     Signs   Apprehension   neg      neg       Relocation   no change     no change      Jerk test  neg     neg    EXTREMITY NEURO-VASCULAR EXAM:    Sensation grossly intact to light touch all dermatomal regions.    DTR 2+ Biceps, Triceps, BR and Negative Harrisons sign   Grossly intact motor function at Elbow, Wrist and Hand   Distal pulses radial and ulnar 2+, brisk cap refill, symmetric.      NECK:  Painless FROM and spinous processes non-tender. Negative Spurlings sign.      OTHER FINDINGS:      XRAYS:  Xrays including AP, Outlet and Axillary Lateral of shoulder are ordered / images reviewed by me:   No fracture dislocation or other pathology   Acromion type 1   Proximal migration of humeral head: None   GH arthritis: None          ASSESSMENT:   Right shoulder pain:  1. Acute pain of right shoulder    2. Biceps tendonitis, right    3. Right rotator cuff tear arthropathy          PLAN:      1. Repeat CSI today for acute relief.     2. MRI right shoulder    3. F/u after for results.     All questions were answered, patient will contact us for questions or concerns in the interim.

## 2024-03-04 ENCOUNTER — PATIENT MESSAGE (OUTPATIENT)
Dept: SPORTS MEDICINE | Facility: CLINIC | Age: 59
End: 2024-03-04
Payer: COMMERCIAL

## 2024-03-10 NOTE — TELEPHONE ENCOUNTER
No care due was identified.  Health [facility] Embedded Care Due Messages. Reference number: [id number].   3/10/2024 12:04:28 PM CDT

## 2024-03-11 RX ORDER — TIZANIDINE 4 MG/1
TABLET ORAL
Qty: 30 TABLET | Refills: 0 | Status: SHIPPED | OUTPATIENT
Start: 2024-03-11 | End: 2024-05-20

## 2024-03-20 DIAGNOSIS — A60.09 HERPES GENITALIS IN WOMEN: ICD-10-CM

## 2024-03-21 ENCOUNTER — PATIENT MESSAGE (OUTPATIENT)
Dept: SPORTS MEDICINE | Facility: CLINIC | Age: 59
End: 2024-03-21

## 2024-03-21 RX ORDER — VALACYCLOVIR HYDROCHLORIDE 500 MG/1
500 TABLET, FILM COATED ORAL 2 TIMES DAILY
Qty: 28 TABLET | Refills: 0 | Status: SHIPPED | OUTPATIENT
Start: 2024-03-21 | End: 2024-04-04

## 2024-03-21 NOTE — TELEPHONE ENCOUNTER
No care due was identified.  Health [facility] Embedded Care Due Messages. Reference number: [id number].   3/20/2024 11:52:55 PM CDT

## 2024-04-02 ENCOUNTER — OFFICE VISIT (OUTPATIENT)
Dept: SPORTS MEDICINE | Facility: CLINIC | Age: 59
End: 2024-04-02
Payer: COMMERCIAL

## 2024-04-02 VITALS
HEART RATE: 86 BPM | SYSTOLIC BLOOD PRESSURE: 133 MMHG | BODY MASS INDEX: 35.43 KG/M2 | HEIGHT: 60 IN | DIASTOLIC BLOOD PRESSURE: 81 MMHG | WEIGHT: 180.44 LBS

## 2024-04-02 DIAGNOSIS — M12.811 RIGHT ROTATOR CUFF TEAR ARTHROPATHY: ICD-10-CM

## 2024-04-02 DIAGNOSIS — M75.101 RIGHT ROTATOR CUFF TEAR ARTHROPATHY: ICD-10-CM

## 2024-04-02 DIAGNOSIS — M75.21 BICEPS TENDONITIS, RIGHT: ICD-10-CM

## 2024-04-02 DIAGNOSIS — M25.511 ACUTE PAIN OF RIGHT SHOULDER: Primary | ICD-10-CM

## 2024-04-02 PROCEDURE — 3008F BODY MASS INDEX DOCD: CPT | Mod: CPTII,S$GLB,, | Performed by: ORTHOPAEDIC SURGERY

## 2024-04-02 PROCEDURE — 99214 OFFICE O/P EST MOD 30 MIN: CPT | Mod: S$GLB,,, | Performed by: ORTHOPAEDIC SURGERY

## 2024-04-02 PROCEDURE — 3079F DIAST BP 80-89 MM HG: CPT | Mod: CPTII,S$GLB,, | Performed by: ORTHOPAEDIC SURGERY

## 2024-04-02 PROCEDURE — 1159F MED LIST DOCD IN RCRD: CPT | Mod: CPTII,S$GLB,, | Performed by: ORTHOPAEDIC SURGERY

## 2024-04-02 PROCEDURE — 3075F SYST BP GE 130 - 139MM HG: CPT | Mod: CPTII,S$GLB,, | Performed by: ORTHOPAEDIC SURGERY

## 2024-04-02 PROCEDURE — 99999 PR PBB SHADOW E&M-EST. PATIENT-LVL III: CPT | Mod: PBBFAC,,, | Performed by: ORTHOPAEDIC SURGERY

## 2024-04-02 NOTE — PROGRESS NOTES
Chief Complaint: Well Woman Exam     HPI:      Jeannie is a 58 y.o.  who presents today for well woman exam.      Denies any breast, vaginal, urinary complaints or pelvic pain today.  She has a history of recurrent UTIs and kidney stones. Also reports a history of vaginal dryness but this improved after starting oral estrogen.     No LMP recorded. Patient has had a hysterectomy. She does not think her ovaries were removed. Hysterectomy was in 2018 due to abnormal uterine bleeding. She is currently on Estradiol 0.5 mg PO with relief from hot flashes/night sweats - managed by PCP.     Denies history of abnormal pap tests.   Previous Mammogram: WNL per patient (2023). Due in 2024 - managed by PCP.   Previous Colonoscopy: WNL (2023), Repeat in     She endorses a family history of breast, GYN, colon, or  cancers - PGM, breast cancer; maternal aunt, CRC     OB History          2    Para   2    Term   2            AB        Living             SAB        IAB        Ectopic        Multiple        Live Births                   ROS:     GENERAL: Feeling well overall.   CARDIOVASCULAR: Denies palpitations or chest pain.   RESPIRATORY: Denies shortness of breath.  BREASTS: see HPI.  ABDOMEN: Denies constipation, diarrhea, blood in stool.  URINARY: see HPI.  REPRODUCTIVE: see HPI.  PSYCHIATRIC: Denies uncontrolled depression or anxiety.    Physical Exam:      Physical Exam     BP (!) 162/97   Pulse 76   Wt 81.1 kg (178 lb 12.7 oz)   BMI 34.92 kg/m²   Body mass index is 34.92 kg/m².     APPEARANCE: Well nourished, well developed, in no acute distress.  PSYCH: Appropriate mood and affect.  CARDIOVASCULAR: Regular rate and rhythm. No edema of peripheral extremities.  PULMONARY: Effort and breath sounds normal.  NECK: Neck symmetric without masses. No thyromegaly.  NODES: No axillary lymph node enlargement.  BREASTS: Symmetrical, no visible skin lesions. No palpable masses. No nipple discharge  bilaterally.  PELVIC: Normal external genitalia without lesions.  Normal hair distribution.  Adequate perineal body, normal urethral meatus.  Vagina moist and well rugated. Without lesions. Vagina without abnormal discharge.  Normal appearing vaginal cuff.. No significant cystocele or rectocele.  Bimanual exam limited by habitus.      A female chaperone was present for the pelvic exam.       Assessment/Plan:     Routine gynecological examination      PLAN:    Pap test not indicated - hysterectomy w/o prior diagnosis of TERESA 2+  Mammogram up to date - due 6/2024   Colonoscopy up to date - due 6/2030    Follow up in about 1 year (around 4/3/2025).    Counseling:     Patient was counseled today on the recommendation for yearly wellness exams, importance of breast self awareness and annual mammograms, as well as the current ASCCP pap guidelines. She was counseled on importance of regular exercise. She is to see her PCP for other health maintenance.     Use of the [facility] Patient Portal discussed and encouraged during today's visit.

## 2024-04-02 NOTE — PROGRESS NOTES
CC: RIGHT shoulder pain    58 y.o. female presents for external MRI review of right shoulder.  Concern for rotator cuff tear. Patient does not report any new incidents or injuries since their last appointment. Pain and symptoms remain unchanged since his last appointment. Here today to discuss treatment options.         Initial Hx (23):    58 y.o. Female with a 1 week history of right shoulder atraumatic pain. Patient reports no injury or change in activity.   Pain is noted along the bicep and bicep groove. The pain today is 5/10. She has tried NSAIDs and heat with minimal relief.   Is affecting ADLs.        Past Medical History:   Diagnosis Date    Allergy     Asthma     Keloid cicatrix     Nephrolithiasis     Osteoarthritis of lumbar spine     Sciatica        Past Surgical History:   Procedure Laterality Date    CARPAL TUNNEL RELEASE Left 2020    Procedure: RELEASE, CARPAL TUNNEL-Left;  Surgeon: Fay Muniz MD;  Location: [facility];  Service: Orthopedics;  Laterality: Left;  General/ regional     SECTION      COLONOSCOPY N/A 2018    Procedure: COLONOSCOPY with SOFÍA in 4 weeks;  Surgeon: Cali Ramos MD;  Location: [facility] (4TH FLR);  Service: Endoscopy;  Laterality: N/A;    COLONOSCOPY N/A 2023    Procedure: COLONOSCOPY;  Surgeon: JAX Ramos MD;  Location: [facility] ([facility] FLR);  Service: Endoscopy;  Laterality: N/A;  Sofía, No preference, Standard prep, PEG instr portal -ml    HYSTERECTOMY      for hypermenorrhea    INJECTION OF STEROID Right 2020    Procedure: INJECTION, STEROID-right carpal tunnel;  Surgeon: Fay Muniz MD;  Location: [facility] OR;  Service: Orthopedics;  Laterality: Right;  General/Regional       Family History   Problem Relation Age of Onset    Heart disease Mother         cardiomyopathy, CAD.    Hyperlipidemia Mother     Hypertension Mother     Heart disease Father     Alcohol abuse Sister     Depression Sister     No  Known Problems Brother     No Known Problems Daughter     Asthma Son     Cancer Maternal Aunt         colon    Cancer Paternal Grandmother         breast cancer    Breast cancer Paternal Grandmother     Melanoma Neg Hx     Acne Neg Hx     Lupus Neg Hx     Psoriasis Neg Hx     Eczema Neg Hx          Current Outpatient Medications:     butalbital-acetaminophen-caffeine -40 mg (FIORICET, ESGIC) -40 mg per tablet, TAKE 1 TO 2 TABLETS BY MOUTH EVERY 4 TO 6 HOURS AS NEEDED FOR PAIN, Disp: 24 tablet, Rfl: 0    cyproheptadine (PERIACTIN) 4 mg tablet, Take 1 tablet (4 mg total) by mouth 2 (two) times daily as needed., Disp: 60 tablet, Rfl: 5    estradioL (ESTRACE) 0.5 MG tablet, Take 2 tablets by mouth once daily, Disp: 180 tablet, Rfl: 3    hydroquinone 4 % Crea, Apply to dark spots once daily. Use with sunscreen if outdoors, Disp: 28 g, Rfl: 1    tiZANidine (ZANAFLEX) 4 MG tablet, TAKE 1 TABLET BY MOUTH EVERY 8 HOURS FOR 10 DAYS, Disp: 30 tablet, Rfl: 0    tretinoin (RETIN-A) 0.025 % cream, Apply pea-sized amount to entire face at bedtime.  If dryness, use every third night and increase as tolerated to every night., Disp: 20 g, Rfl: 6    valACYclovir (VALTREX) 500 MG tablet, Take 1 tablet (500 mg total) by mouth 2 (two) times daily. for 14 days, Disp: 28 tablet, Rfl: 0    meloxicam (MOBIC) 7.5 MG tablet, Take 1 tablet (7.5 mg total) by mouth once daily. (Patient not taking: Reported on 2/26/2024), Disp: 30 tablet, Rfl: 3    Review of patient's allergies indicates:  No Known Allergies       REVIEW OF SYSTEMS:  Constitution: Negative. Negative for chills, fever and night sweats.   HENT: Negative for congestion and headaches.    Eyes: Negative for blurred vision, left vision loss and right vision loss.   Cardiovascular: Negative for chest pain and syncope.   Respiratory: Negative for cough and shortness of breath.    Endocrine: Negative for polydipsia, polyphagia and polyuria.   Hematologic/Lymphatic: Negative for  bleeding problem. Does not bruise/bleed easily.   Skin: Negative for dry skin, itching and rash.   Musculoskeletal: Negative for falls.  Positive for right shoulder pain and muscle weakness.   Gastrointestinal: Negative for abdominal pain and bowel incontinence.   Genitourinary: Negative for bladder incontinence and nocturia.   Neurological: Negative for disturbances in coordination, loss of balance and seizures.   Psychiatric/Behavioral: Negative for depression. The patient does not have insomnia.    Allergic/Immunologic: Negative for hives and persistent infections.      PHYSICAL EXAMINATION:  Vitals:  /81   Pulse 86   Ht 5' (1.524 m)   Wt 81.8 kg (180 lb 7.1 oz)   BMI 35.24 kg/m²    General: The patient is alert and oriented x 3.  Mood is pleasant.  Observation of ears, eyes and nose reveal no gross abnormalities.  No labored breathing observed.  Gait is coordinated. Patient can toe walk and heel walk without difficulty.      RIGHT SHOULDER / UPPER EXTREMITY EXAM    OBSERVATION:     Swelling  none  Deformity  none   Discoloration  none   Scapular winging none   Scars   none  Atrophy  none    TENDERNESS / CREPITUS (T/C):          T/C      T/C   Clavicle   -/-  SUPRAspinatus    -/-     AC Jt.    -/-  INFRAspinatus  -/-    SC Jt.    -/-  Deltoid    -/-      G. Tuberosity  -/-  LH BICEP groove  +/-   Acromion:  -/-  Midline Neck   -/-     Scapular Spine -/-  Trapezium   -/-   SMA Scapula  -/-  GH jt. line - post  -/-     Scapulothoracic  -/-         ROM: (* = with pain)  Left shoulder   Right shoulder        AROM (PROM)   AROM (PROM)   FE    170° (175°)     170° (175°)     ER at 0°    60°  (65°)    60°  (65°)   ER at 90° ABD  90°  (90°)    90°  (90°)   IR at 90°  ABD   NA  (40°)     NA  (40°)      IR (spine level)   T10     T10    STRENGTH: (* = with pain) Left shoulder   Right  shoulder   SCAPTION   5/5    4+/5    IR    5/5    4+/5   ER    5/5    4+/5   BICEPS   5/5    4+/5   Deltoid    5/5    4+/5     SIGNS:  Painful side       NEER   +   OJOSES  neg    ZAVALA   +   SPEEDS  neg     DROP ARM   -   BELLY PRESS neg   Superior escape none    LIFT-OFF  neg   X-Body ADD    neg    MOVING VALGUS neg        STABILITY TESTING    Left shoulder   Right shoulder    Translation     Anterior  up face     up face    Posterior  up face    up face    Sulcus   < 10mm    < 10 mm     Signs   Apprehension   neg      neg       Relocation   no change     no change      Jerk test  neg     neg    EXTREMITY NEURO-VASCULAR EXAM:    Sensation grossly intact to light touch all dermatomal regions.    DTR 2+ Biceps, Triceps, BR and Negative Harrisons sign   Grossly intact motor function at Elbow, Wrist and Hand   Distal pulses radial and ulnar 2+, brisk cap refill, symmetric.      NECK:  Painless FROM and spinous processes non-tender. Negative Spurlings sign.      OTHER FINDINGS:      XRAYS:  Xrays including AP, Outlet and Axillary Lateral of shoulder are ordered / images reviewed by me:   No fracture dislocation or other pathology   Acromion type 1   Proximal migration of humeral head: None   GH arthritis: None    External MRI impression(scanned into media):      1. Supraspinatus tendinosis with acute full-thickness full width tear anterior/mid fibers and an acute partial thickness partial width moderate grade articular surface tear posterior fibers with differential tendon retraction.  Infraspinatus tendinosis with acute partial thickness partial width moderate grade bursal surface tear.  Subscapularis tendinosis with acute partial low grade articular surface/intrasubstance tear proximal fibers.   2. Biceps tenosynovitis.   3. Superior glenoid labral tear.   4. Rotator interval adhesive capsulitis         ASSESSMENT:   Right shoulder pain:  1. Acute pain of right shoulder    2. Biceps tendonitis, right    3.  Right rotator cuff tear arthropathy          PLAN:      Treatment options were discussed with the patient about shoulder.  I reviewed the MRI images with her and what this means for her shoulder.    We discussed both non-operative and operative options for her shoulder and the risks and benefits of each. Time was given for questions to be asked and all concerns were answered.    She would like to go forward with surgery for her shoulder which I think is reasonable.  All specific risks and benefits were reviewed.    These risks include but are not limited to: bleeding, infection, scarring, re-tear of repair, irreparability of the tear, damage to neurovascular structures, damage to cartilage, stiffness, blood clots, pulmonary embolism, swelling, compartment syndrome, need for further surgery, and the risks of anesthesia.      She verbalized her understanding of these risks and wished to proceed with surgery.    Time was spent face-to-face with the patient during this encounter on counseling about treatment options including surgery and coordination of her care for preoperative visits, surgery and post-operative rehab.     The operative plan will be:    right   1. Arthroscopic rotator cuff repair  2. Arthroscopic subacromial decompression  3. Arthroscopic distal clavicle excision  4. Possible open biceps subpectoral tenodesis    Patient will  need medical clearance prior to the pre-operative appointment.    All questions were answered, patient will contact us for questions or concerns in the interim.

## 2024-04-03 ENCOUNTER — OFFICE VISIT (OUTPATIENT)
Dept: OBSTETRICS AND GYNECOLOGY | Facility: CLINIC | Age: 59
End: 2024-04-03
Payer: COMMERCIAL

## 2024-04-03 VITALS
DIASTOLIC BLOOD PRESSURE: 97 MMHG | SYSTOLIC BLOOD PRESSURE: 162 MMHG | HEART RATE: 76 BPM | BODY MASS INDEX: 34.92 KG/M2 | WEIGHT: 178.81 LBS

## 2024-04-03 DIAGNOSIS — Z01.419 ROUTINE GYNECOLOGICAL EXAMINATION: Primary | ICD-10-CM

## 2024-04-03 PROCEDURE — 1159F MED LIST DOCD IN RCRD: CPT | Mod: CPTII,S$GLB,, | Performed by: NURSE PRACTITIONER

## 2024-04-03 PROCEDURE — 1160F RVW MEDS BY RX/DR IN RCRD: CPT | Mod: CPTII,S$GLB,, | Performed by: NURSE PRACTITIONER

## 2024-04-03 PROCEDURE — 3008F BODY MASS INDEX DOCD: CPT | Mod: CPTII,S$GLB,, | Performed by: NURSE PRACTITIONER

## 2024-04-03 PROCEDURE — 3080F DIAST BP >= 90 MM HG: CPT | Mod: CPTII,S$GLB,, | Performed by: NURSE PRACTITIONER

## 2024-04-03 PROCEDURE — 3077F SYST BP >= 140 MM HG: CPT | Mod: CPTII,S$GLB,, | Performed by: NURSE PRACTITIONER

## 2024-04-03 PROCEDURE — 99386 PREV VISIT NEW AGE 40-64: CPT | Mod: S$GLB,,, | Performed by: NURSE PRACTITIONER

## 2024-04-03 PROCEDURE — 99999 PR PBB SHADOW E&M-EST. PATIENT-LVL III: CPT | Mod: PBBFAC,,, | Performed by: NURSE PRACTITIONER

## 2024-04-03 PROCEDURE — 99459 PELVIC EXAMINATION: CPT | Mod: S$GLB,,, | Performed by: NURSE PRACTITIONER

## 2024-05-13 ENCOUNTER — PATIENT MESSAGE (OUTPATIENT)
Dept: INTERNAL MEDICINE | Facility: CLINIC | Age: 59
End: 2024-05-13
Payer: COMMERCIAL

## 2024-05-14 ENCOUNTER — PATIENT MESSAGE (OUTPATIENT)
Dept: INTERNAL MEDICINE | Facility: CLINIC | Age: 59
End: 2024-05-14
Payer: COMMERCIAL

## 2024-05-14 NOTE — TELEPHONE ENCOUNTER
I don't know of cheaper alternative.  She needs to ask insurance company what estrogen is covered.  Estradiol is typically affordable.

## 2024-05-15 ENCOUNTER — PATIENT MESSAGE (OUTPATIENT)
Dept: INTERNAL MEDICINE | Facility: CLINIC | Age: 59
End: 2024-05-15
Payer: COMMERCIAL

## 2024-05-15 RX ORDER — CYPROHEPTADINE HYDROCHLORIDE 4 MG/1
4 TABLET ORAL 2 TIMES DAILY PRN
Qty: 60 TABLET | Refills: 5 | Status: SHIPPED | OUTPATIENT
Start: 2024-05-15

## 2024-05-15 NOTE — TELEPHONE ENCOUNTER
No care due was identified.  [facility] Embedded Care Due Messages. Reference number: [id number].   5/15/2024 10:15:46 AM CDT

## 2024-05-17 ENCOUNTER — PATIENT MESSAGE (OUTPATIENT)
Dept: INTERNAL MEDICINE | Facility: CLINIC | Age: 59
End: 2024-05-17
Payer: COMMERCIAL

## 2024-05-19 NOTE — TELEPHONE ENCOUNTER
Refill Routing Note   Medication(s) are not appropriate for processing by [facility] for the following reason(s):        Outside of protocol    [facility] action(s):  Route               Appointments  past 12m or future 3m with PCP    Date Provider   Last Visit   10/30/2023 Jannette Salguero MD   Next Visit   5/18/2024 Jannette Salguero MD   ED visits in past 90 days: 0        Note composed:11:55 AM 05/19/2024

## 2024-05-19 NOTE — TELEPHONE ENCOUNTER
No care due was identified.  [facility] Embedded Care Due Messages. Reference number: [id number].   5/18/2024 10:43:12 PM CDT

## 2024-05-20 RX ORDER — TIZANIDINE 4 MG/1
TABLET ORAL
Qty: 30 TABLET | Refills: 0 | Status: SHIPPED | OUTPATIENT
Start: 2024-05-20

## 2024-05-24 RX ORDER — HYDROCORTISONE 25 MG/G
CREAM TOPICAL 2 TIMES DAILY
Qty: 20 G | Refills: 1 | Status: SHIPPED | OUTPATIENT
Start: 2024-05-24

## 2024-05-24 NOTE — TELEPHONE ENCOUNTER
"Pt has hemorrhoids and states Dr. Salguero has prescribed a "adam foam" for this in the past. Not seeing information on this   "

## 2024-05-27 ENCOUNTER — OFFICE VISIT (OUTPATIENT)
Dept: INTERNAL MEDICINE | Facility: CLINIC | Age: 59
End: 2024-05-27
Payer: COMMERCIAL

## 2024-05-27 ENCOUNTER — LAB VISIT (OUTPATIENT)
Dept: LAB | Facility: HOSPITAL | Age: 59
End: 2024-05-27
Attending: INTERNAL MEDICINE
Payer: COMMERCIAL

## 2024-05-27 VITALS
HEART RATE: 76 BPM | WEIGHT: 181.19 LBS | OXYGEN SATURATION: 98 % | SYSTOLIC BLOOD PRESSURE: 136 MMHG | BODY MASS INDEX: 35.57 KG/M2 | HEIGHT: 60 IN | DIASTOLIC BLOOD PRESSURE: 86 MMHG

## 2024-05-27 DIAGNOSIS — M54.42 CHRONIC LEFT-SIDED LOW BACK PAIN WITH LEFT-SIDED SCIATICA: ICD-10-CM

## 2024-05-27 DIAGNOSIS — N95.1 MENOPAUSAL SYMPTOMS: ICD-10-CM

## 2024-05-27 DIAGNOSIS — S60.561A INSECT BITE OF RIGHT HAND, INITIAL ENCOUNTER: ICD-10-CM

## 2024-05-27 DIAGNOSIS — G89.29 CHRONIC LEFT-SIDED LOW BACK PAIN WITH LEFT-SIDED SCIATICA: ICD-10-CM
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
## 2024-05-27 DIAGNOSIS — K64.9 HEMORRHOIDS, UNSPECIFIED HEMORRHOID TYPE: ICD-10-CM

## 2024-05-27 DIAGNOSIS — Z00.00 ANNUAL PHYSICAL EXAM: ICD-10-CM

## 2024-05-27 DIAGNOSIS — G43.809 OTHER MIGRAINE WITHOUT STATUS MIGRAINOSUS, NOT INTRACTABLE: ICD-10-CM

## 2024-05-27 DIAGNOSIS — L30.4 INTERTRIGO: ICD-10-CM

## 2024-05-27 DIAGNOSIS — Z00.00 ANNUAL PHYSICAL EXAM: Primary | ICD-10-CM

## 2024-05-27 PROBLEM — G43.909 MIGRAINE WITHOUT STATUS MIGRAINOSUS, NOT INTRACTABLE: Status: ACTIVE | Noted: 2024-05-27

## 2024-05-27 LAB
ALBUMIN SERPL BCP-MCNC: 3.6 G/DL (ref 3.5–5.2)
ALP SERPL-CCNC: 54 U/L (ref 55–135)
ALT SERPL W/O P-5'-P-CCNC: 33 U/L (ref 10–44)
ANION GAP SERPL CALC-SCNC: 6 MMOL/L (ref 8–16)
AST SERPL-CCNC: 39 U/L (ref 10–40)
BILIRUB SERPL-MCNC: 0.3 MG/DL (ref 0.1–1)
BUN SERPL-MCNC: 16 MG/DL (ref 6–20)
CALCIUM SERPL-MCNC: 9.4 MG/DL (ref 8.7–10.5)
CHLORIDE SERPL-SCNC: 106 MMOL/L (ref 95–110)
CHOLEST SERPL-MCNC: 183 MG/DL (ref 120–199)
CHOLEST/HDLC SERPL: 2.7 {RATIO} (ref 2–5)
CO2 SERPL-SCNC: 27 MMOL/L (ref 23–29)
CREAT SERPL-MCNC: 0.8 MG/DL (ref 0.5–1.4)
ERYTHROCYTE [DISTWIDTH] IN BLOOD BY AUTOMATED COUNT: 13.3 % (ref 11.5–14.5)
EST. GFR  (NO RACE VARIABLE): >60 ML/MIN/1.73 M^2
GLUCOSE SERPL-MCNC: 94 MG/DL (ref 70–110)
HCT VFR BLD AUTO: 39.2 % (ref 37–48.5)
HDLC SERPL-MCNC: 67 MG/DL (ref 40–75)
HDLC SERPL: 36.6 % (ref 20–50)
HGB BLD-MCNC: 12.6 G/DL (ref 12–16)
HIV 1+2 AB+HIV1 P24 AG SERPL QL IA: NORMAL
LDLC SERPL CALC-MCNC: 97.6 MG/DL (ref 63–159)
MCH RBC QN AUTO: 28.4 PG (ref 27–31)
MCHC RBC AUTO-ENTMCNC: 32.1 G/DL (ref 32–36)
MCV RBC AUTO: 89 FL (ref 82–98)
NONHDLC SERPL-MCNC: 116 MG/DL
PLATELET # BLD AUTO: 326 K/UL (ref 150–450)
PMV BLD AUTO: 10.8 FL (ref 9.2–12.9)
POTASSIUM SERPL-SCNC: 4.5 MMOL/L (ref 3.5–5.1)
PROT SERPL-MCNC: 6.7 G/DL (ref 6–8.4)
RBC # BLD AUTO: 4.43 M/UL (ref 4–5.4)
SODIUM SERPL-SCNC: 139 MMOL/L (ref 136–145)
TRIGL SERPL-MCNC: 92 MG/DL (ref 30–150)
WBC # BLD AUTO: 4.58 K/UL (ref 3.9–12.7)

## 2024-05-27 PROCEDURE — 85027 COMPLETE CBC AUTOMATED: CPT | Performed by: INTERNAL MEDICINE

## 2024-05-27 PROCEDURE — 87389 HIV-1 AG W/HIV-1&-2 AB AG IA: CPT | Performed by: INTERNAL MEDICINE

## 2024-05-27 PROCEDURE — 99396 PREV VISIT EST AGE 40-64: CPT | Mod: S$GLB,,, | Performed by: INTERNAL MEDICINE

## 2024-05-27 PROCEDURE — 1159F MED LIST DOCD IN RCRD: CPT | Mod: CPTII,S$GLB,, | Performed by: INTERNAL MEDICINE

## 2024-05-27 PROCEDURE — 3079F DIAST BP 80-89 MM HG: CPT | Mod: CPTII,S$GLB,, | Performed by: INTERNAL MEDICINE

## 2024-05-27 PROCEDURE — 80053 COMPREHEN METABOLIC PANEL: CPT | Performed by: INTERNAL MEDICINE

## 2024-05-27 PROCEDURE — 3075F SYST BP GE 130 - 139MM HG: CPT | Mod: CPTII,S$GLB,, | Performed by: INTERNAL MEDICINE

## 2024-05-27 PROCEDURE — 36415 COLL VENOUS BLD VENIPUNCTURE: CPT | Performed by: INTERNAL MEDICINE

## 2024-05-27 PROCEDURE — 3008F BODY MASS INDEX DOCD: CPT | Mod: CPTII,S$GLB,, | Performed by: INTERNAL MEDICINE

## 2024-05-27 PROCEDURE — 80061 LIPID PANEL: CPT | Performed by: INTERNAL MEDICINE

## 2024-05-27 PROCEDURE — 99999 PR PBB SHADOW E&M-EST. PATIENT-LVL III: CPT | Mod: PBBFAC,,, | Performed by: INTERNAL MEDICINE

## 2024-05-27 RX ORDER — KETOCONAZOLE 20 MG/G
CREAM TOPICAL DAILY
Qty: 30 G | Refills: 0 | Status: SHIPPED | OUTPATIENT
Start: 2024-05-27

## 2024-05-27 NOTE — PROGRESS NOTES
Subjective     Patient ID: Jeannie Saavedra is a 58 y.o. female.    Chief Complaint: Follow-up  /PE  Follow-up  Pertinent negatives include no abdominal pain, chest pain, congestion, fever, headaches, nausea, rash or vomiting.     She takes oral estrogen for menopausal symptoms.     Price increased with new insurance plan.   See recent MO messages.     She procured a coupon and is taking 0.5 mg estrogen.  Hot flashes are controlled.    She c/o rash under breasts - used  a relative's  ketoconazole cream, and rash has improved.    She would like Rx.    She thinks she was bitten on R side of hand by an insect day or 2 ago.     ITched initially, now better.    She c/o recent worsening of hemorrhoids.    Proctofoam not covered.      I called in hydrocortisone cream 2.5 %, but she has not picked up.    Migraines not as bad as in the past.    May occur 3 times a week.  Fioricet helpful.    L knee pain is intermittent.   She has appt upcoming with Dr Palacios.     Prior meniscal tear repair.    Intermittent muscle spasms in legs, back, for which she taking tizanidine.    She has told her mother and she should have genetic testing.  Review of Systems   Constitutional:  Negative for fever and unexpected weight change.   HENT:  Negative for nasal congestion and postnasal drip.    Respiratory:  Negative for chest tightness, shortness of breath and wheezing.    Cardiovascular:  Negative for chest pain and leg swelling.   Gastrointestinal:  Negative for abdominal pain, anal bleeding, constipation, diarrhea, nausea and vomiting.   Genitourinary:  Negative for dysuria and urgency.   Integumentary:  Negative for rash.   Neurological:  Negative for headaches.   Psychiatric/Behavioral:  Negative for dysphoric mood and sleep disturbance. The patient is not nervous/anxious.           Objective     Physical Exam  Constitutional:       General: She is not in acute distress.     Appearance: She is well-developed.   HENT:      Head:  Normocephalic and atraumatic.      Right Ear: External ear normal.      Left Ear: External ear normal.      Nose: Nose normal.   Eyes:      General: No scleral icterus.        Right eye: No discharge.         Left eye: No discharge.      Conjunctiva/sclera: Conjunctivae normal.      Pupils: Pupils are equal, round, and reactive to light.   Neck:      Thyroid: No thyromegaly.      Vascular: No JVD.   Cardiovascular:      Rate and Rhythm: Normal rate and regular rhythm.      Heart sounds: Normal heart sounds. No murmur heard.     No gallop.   Pulmonary:      Effort: Pulmonary effort is normal. No respiratory distress.      Breath sounds: Normal breath sounds. No wheezing or rales.   Abdominal:      General: Bowel sounds are normal. There is no distension.      Palpations: Abdomen is soft. There is no mass.      Tenderness: There is no abdominal tenderness. There is no guarding or rebound.   Musculoskeletal:         General: No tenderness. Normal range of motion.      Cervical back: Normal range of motion and neck supple.   Lymphadenopathy:      Cervical: No cervical adenopathy.   Skin:     General: Skin is warm and dry.      Findings: No rash (no rash underbreasts.  Coupls of small papules R lateral hand).   Neurological:      Mental Status: She is alert and oriented to person, place, and time.      Cranial Nerves: No cranial nerve deficit.      Coordination: Coordination normal.   Psychiatric:         Behavior: Behavior normal.         Thought Content: Thought content normal.         Judgment: Judgment normal.            Assessment and Plan     1. Annual physical exam  -     Comprehensive Metabolic Panel; Future; Expected date: 05/27/2024  -     Lipid Panel; Future; Expected date: 05/27/2024  -     CBC Without Differential; Future; Expected date: 05/27/2024    2. Chronic left-sided low back pain with left-sided sciatica    3. Intertrigo    4. Menopausal symptoms    5. Other migraine without status migrainosus, not  intractable    6. Hemorrhoids, unspecified hemorrhoid type    7. Insect bite of right hand, initial encounter    8. BMI 35.0-35.9,adult    Other orders  -     ketoconazole (NIZORAL) 2 % cream; Apply topically once daily.  Dispense: 30 g; Refill: 0        Form completed         Follow up in about 1 year (around 5/27/2025) for PE.  Flu and covid this fall.  Exercise regularly.  Weight loss diet reviewed.

## 2024-05-29 ENCOUNTER — PATIENT MESSAGE (OUTPATIENT)
Dept: INTERNAL MEDICINE | Facility: CLINIC | Age: 59
End: 2024-05-29
Payer: COMMERCIAL

## 2024-06-10 ENCOUNTER — PATIENT MESSAGE (OUTPATIENT)
Dept: INTERNAL MEDICINE | Facility: CLINIC | Age: 59
End: 2024-06-10
Payer: COMMERCIAL

## 2024-06-19 ENCOUNTER — PATIENT MESSAGE (OUTPATIENT)
Dept: INTERNAL MEDICINE | Facility: CLINIC | Age: 59
End: 2024-06-19
Payer: COMMERCIAL

## 2024-06-20 ENCOUNTER — PATIENT MESSAGE (OUTPATIENT)
Dept: INTERNAL MEDICINE | Facility: CLINIC | Age: 59
End: 2024-06-20

## 2024-06-20 ENCOUNTER — E-VISIT (OUTPATIENT)
Dept: INTERNAL MEDICINE | Facility: CLINIC | Age: 59
End: 2024-06-20
Payer: COMMERCIAL

## 2024-06-20 DIAGNOSIS — L30.4 INTERTRIGO: Primary | ICD-10-CM

## 2024-06-20 RX ORDER — KETOCONAZOLE 20 MG/G
CREAM TOPICAL DAILY
Qty: 30 G | Refills: 0 | Status: SHIPPED | OUTPATIENT
Start: 2024-06-20

## 2024-06-20 NOTE — TELEPHONE ENCOUNTER
Pt requesting rx for rash on inner thigh    Stated that on 6/17 she used a new roll-on deodorant with goat milk as the ingredient and believe she is having an allergic reaction to it    She noticed the rash on 6/19, it's red, itchy and burns    She has not taken anything to relieve the symptoms.    I reviewed allergies, and advised pt to try Bendadryl if she had some and informed that I would forward message to MD. Pt verbalized understanding.    This is the 2nd message, previous message pt sent an Evisit but she stated that she tried to complete it and didn't get any further instruction so she sent another message.

## 2024-06-26 ENCOUNTER — PATIENT MESSAGE (OUTPATIENT)
Dept: INTERNAL MEDICINE | Facility: CLINIC | Age: 59
End: 2024-06-26
Payer: COMMERCIAL

## 2024-06-28 ENCOUNTER — OCCUPATIONAL HEALTH (OUTPATIENT)
Dept: URGENT CARE | Facility: CLINIC | Age: 59
End: 2024-06-28

## 2024-06-28 VITALS — BODY MASS INDEX: 35.15 KG/M2 | WEIGHT: 180 LBS

## 2024-06-28 DIAGNOSIS — Z02.89 ENCOUNTER FOR EXAMINATION REQUIRED BY DEPARTMENT OF TRANSPORTATION (DOT): Primary | ICD-10-CM

## 2024-07-02 ENCOUNTER — OFFICE VISIT (OUTPATIENT)
Dept: NEUROLOGY | Facility: CLINIC | Age: 59
End: 2024-07-02
Payer: COMMERCIAL

## 2024-07-02 VITALS
HEIGHT: 60 IN | SYSTOLIC BLOOD PRESSURE: 130 MMHG | HEART RATE: 86 BPM | DIASTOLIC BLOOD PRESSURE: 86 MMHG | WEIGHT: 178.56 LBS | BODY MASS INDEX: 35.05 KG/M2

## 2024-07-02 DIAGNOSIS — G89.29 CHRONIC MIDLINE LOW BACK PAIN WITH LEFT-SIDED SCIATICA: ICD-10-CM

## 2024-07-02 DIAGNOSIS — G43.119 INTRACTABLE MIGRAINE WITH AURA WITHOUT STATUS MIGRAINOSUS: Primary | ICD-10-CM

## 2024-07-02 DIAGNOSIS — M54.42 CHRONIC MIDLINE LOW BACK PAIN WITH LEFT-SIDED SCIATICA: ICD-10-CM

## 2024-07-02 DIAGNOSIS — R11.0 NAUSEA: ICD-10-CM

## 2024-07-02 PROCEDURE — 3079F DIAST BP 80-89 MM HG: CPT | Mod: CPTII,S$GLB,, | Performed by: PSYCHIATRY & NEUROLOGY

## 2024-07-02 PROCEDURE — 99999 PR PBB SHADOW E&M-EST. PATIENT-LVL III: CPT | Mod: PBBFAC,,, | Performed by: PSYCHIATRY & NEUROLOGY

## 2024-07-02 PROCEDURE — 1159F MED LIST DOCD IN RCRD: CPT | Mod: CPTII,S$GLB,, | Performed by: PSYCHIATRY & NEUROLOGY

## 2024-07-02 PROCEDURE — 3008F BODY MASS INDEX DOCD: CPT | Mod: CPTII,S$GLB,, | Performed by: PSYCHIATRY & NEUROLOGY

## 2024-07-02 PROCEDURE — 1160F RVW MEDS BY RX/DR IN RCRD: CPT | Mod: CPTII,S$GLB,, | Performed by: PSYCHIATRY & NEUROLOGY

## 2024-07-02 PROCEDURE — 99204 OFFICE O/P NEW MOD 45 MIN: CPT | Mod: S$GLB,,, | Performed by: PSYCHIATRY & NEUROLOGY

## 2024-07-02 PROCEDURE — 3075F SYST BP GE 130 - 139MM HG: CPT | Mod: CPTII,S$GLB,, | Performed by: PSYCHIATRY & NEUROLOGY

## 2024-07-02 RX ORDER — BUTALBITAL, ACETAMINOPHEN AND CAFFEINE 50; 325; 40 MG/1; MG/1; MG/1
TABLET ORAL
Qty: 24 TABLET | Refills: 0 | Status: SHIPPED | OUTPATIENT
Start: 2024-07-02

## 2024-07-02 RX ORDER — ONDANSETRON 8 MG/1
8 TABLET, ORALLY DISINTEGRATING ORAL EVERY 8 HOURS PRN
Qty: 24 TABLET | Refills: 1 | Status: SHIPPED | OUTPATIENT
Start: 2024-07-02

## 2024-07-02 RX ORDER — LIDOCAINE 50 MG/G
1 PATCH TOPICAL DAILY
COMMUNITY
Start: 2024-05-31

## 2024-07-02 RX ORDER — AMITRIPTYLINE HYDROCHLORIDE 25 MG/1
TABLET, FILM COATED ORAL
Qty: 60 TABLET | Refills: 3 | Status: SHIPPED | OUTPATIENT
Start: 2024-07-02

## 2024-07-02 RX ORDER — METHOCARBAMOL 750 MG/1
TABLET, FILM COATED ORAL
Qty: 15 TABLET | Refills: 1 | Status: SHIPPED | OUTPATIENT
Start: 2024-07-02

## 2024-07-02 RX ORDER — RIZATRIPTAN BENZOATE 10 MG/1
TABLET, ORALLY DISINTEGRATING ORAL
Qty: 12 TABLET | Refills: 3 | Status: SHIPPED | OUTPATIENT
Start: 2024-07-02

## 2024-07-02 NOTE — PROGRESS NOTES
"Subjective:       Patient ID: Jeannie Saavedra is a 58 y.o. female.    Chief Complaint: Headache      Ms Saavedra is a 58-year-old right-handed female with a history of bilateral carpal tunnel syndrome, kidney stones, recurrent UTI, chronic low back pain with left-sided sciatica and migraine headaches who was referred by primary care for further management of migraines.      She states she has had headaches for about 10 years and they have always had somewhat of an erratic pattern in that she can go a month without a single headache but then at other times will have them every other day as many as 10 or 15 a month.  She has been to the emergency room several times for a refractory headache most recently when she received morphine which did help her.    In general she does not know what her triggers are but she can get relief by seeking dark and quiet.      The head pain is generally in 1 temple or another and is stabbing.  Other times she will have pain in the mid forehead and other times throbbing on the top of the head.  Occasionally she has vomiting.  She does not have menstrual cycles as she has had a partial hysterectomy.      Fioricet generally used to work well but now she needs to take 2 at a time.  It was last filled about a month ago she believes for 30 pills; the fill prior to that was in February.  She does not overuse them.      She has no family history of migraines and no history of head injury.      Gabapentin exacerbated the headaches.  She thinks she had amitriptyline in the past and that it started to work and then it wore off.  She does not recall a trial of Topamax but is unsure.  Currently she takes tizanidine q.h.s. for "muscle spasms" of the low back.      Imitrex made her feel nauseated and she does not recall any other migraine specific abortive.      CT Head Without Contrast  Reading Physician Reading Date Result Priority  Keesha Alvarez, " MD  [phone] 10/28/2023 STAT    Narrative & Impression  EXAMINATION:  CT OF THE HEAD WITHOUT     CLINICAL HISTORY:  Headache, sudden, severe;     TECHNIQUE:  5 mm unenhanced axial images were obtained from the skull base to the vertex.     COMPARISON:  None.     FINDINGS:  The ventricles, basal cisterns, and cortical sulci are within normal limits for patient's stated age. There is no acute intracranial hemorrhage, territorial infarct or mass effect, or midline shift. The visualized paranasal sinuses and mastoid air cells are clear.     Impression:     No acute intracranial abnormality detected.        Electronically signed by:Keesha Alvarez  Date:                                            10/28/2023  Time:                                           23:21        Exam Ended: 10/28/23 23:16 CDT              Past Medical History:   Diagnosis Date    Allergy     Asthma     Keloid cicatrix     Nephrolithiasis     Osteoarthritis of lumbar spine     Sciatica       Past Surgical History:   Procedure Laterality Date    CARPAL TUNNEL RELEASE Left 2020    Procedure: RELEASE, CARPAL TUNNEL-Left;  Surgeon: Fay Muniz MD;  Location: [facility] OR;  Service: Orthopedics;  Laterality: Left;  General/ regional     SECTION      COLONOSCOPY N/A 2018    Procedure: COLONOSCOPY with SOFÍA in 4 weeks;  Surgeon: Cali Ramos MD;  Location: [facility] ENDO (4TH FLR);  Service: Endoscopy;  Laterality: N/A;    COLONOSCOPY N/A 2023    Procedure: COLONOSCOPY;  Surgeon: JAX Ramos MD;  Location: [facility] ENDO (4TH FLR);  Service: Endoscopy;  Laterality: N/A;  Sofía, Deandra preference, Standard prep, PEG instr portal -ml    HYSTERECTOMY      for hypermenorrhea    INJECTION OF STEROID Right 2020    Procedure: INJECTION, STEROID-right carpal tunnel;  Surgeon: Fay Muniz MD;  Location: [facility] OR;  Service: Orthopedics;  Laterality: Right;  General/Regional        Current Outpatient Medications:      cyproheptadine (PERIACTIN) 4 mg tablet, Take 1 tablet (4 mg total) by mouth 2 (two) times daily as needed., Disp: 60 tablet, Rfl: 5    estradioL (ESTRACE) 0.5 MG tablet, Take 2 tablets by mouth once daily, Disp: 180 tablet, Rfl: 3    hydroquinone 4 % Crea, Apply to dark spots once daily. Use with sunscreen if outdoors, Disp: 28 g, Rfl: 1    ketoconazole (NIZORAL) 2 % cream, Apply topically once daily., Disp: 30 g, Rfl: 0    LIDOcaine (LIDODERM) 5 %, Place 1 patch onto the skin once daily., Disp: , Rfl:     tretinoin (RETIN-A) 0.025 % cream, Apply pea-sized amount to entire face at bedtime.  If dryness, use every third night and increase as tolerated to every night., Disp: 20 g, Rfl: 6    amitriptyline (ELAVIL) 25 MG tablet, One po qhs for 2 weeks then 2 po qhs, Disp: 60 tablet, Rfl: 3    butalbital-acetaminophen-caffeine -40 mg (FIORICET, ESGIC) -40 mg per tablet, TAKE 1 TO 2 TABLETS BY MOUTH EVERY 4 TO 6 HOURS AS NEEDED FOR PAIN, Disp: 24 tablet, Rfl: 0    methocarbamoL (ROBAXIN) 750 MG Tab, One po qhs prn LBP, Disp: 15 tablet, Rfl: 1    ondansetron (ZOFRAN-ODT) 8 MG TbDL, Take 1 tablet (8 mg total) by mouth every 8 (eight) hours as needed (nausea)., Disp: 24 tablet, Rfl: 1    rizatriptan (MAXALT-MLT) 10 MG disintegrating tablet, May repeat in 2 hours if needed. No more than 3 in 24 hours., Disp: 12 tablet, Rfl: 3    tiZANidine (ZANAFLEX) 4 MG tablet, TAKE 1 TABLET BY MOUTH EVERY 8 HOURS FOR 10 DAYS, Disp: 30 tablet, Rfl: 0    valACYclovir (VALTREX) 500 MG tablet, Take 1 tablet (500 mg total) by mouth 2 (two) times daily., Disp: 28 tablet, Rfl: 3   Review of patient's allergies indicates:  No Known Allergies     Review of Systems   Constitutional:  Negative for unexpected weight change.   Eyes:  Negative for visual disturbance (saw an eye dr 6-21-24 ( for galfelicitas). normal).   Cardiovascular:  Negative for palpitations.   Gastrointestinal:  Positive for constipation (managed with magnesium).    Neurological:  Positive for numbness (hands, CTS) and headaches. Negative for tremors, seizures and weakness.   Psychiatric/Behavioral:  Positive for sleep disturbance (hard to fall asleep and to stay asleep).            Objective:      Physical Exam  Constitutional:       Appearance: She is obese. She is not ill-appearing.   Neurological:      Mental Status: She is alert.   Psychiatric:         Thought Content: Thought content normal.           Assessment:       1. Intractable migraine with aura without status migrainosus    2. Nausea    3. Chronic midline low back pain with left-sided sciatica        Plan:              10 yr history of episodic migraine with infrequent aura --  plan retrial of amitriptyline which might also help her with painful carpal tunnel syndrome at night and with her chronic insomnia.      She knows that if it worsens baseline constipation or if it is not tolerated for other reasons she should let me know in which case I will change to a monthly CGRP monoclonal antibody. She is not a candidate for Topamax given kidney stones.    We will discontinue tizanidine as it is not a good mix with amitriptyline and given that it can also cause orthostasis.    Plan rizatriptan as her abortive.        LBP--  MRI 8- + only for multilevel facet OA ( moderate at L5-S1) and mild DDD L5-S1.   reports she  has night time low back  spasms for which she takes tizanidine qhs.   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 Robaxin.    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

## 2024-07-09 ENCOUNTER — PATIENT MESSAGE (OUTPATIENT)
Dept: NEUROLOGY | Facility: CLINIC | Age: 59
End: 2024-07-09
Payer: COMMERCIAL

## 2024-07-11 DIAGNOSIS — A60.09 HERPES GENITALIS IN WOMEN: ICD-10-CM

## 2024-07-12 RX ORDER — VALACYCLOVIR HYDROCHLORIDE 500 MG/1
500 TABLET, FILM COATED ORAL 2 TIMES DAILY
Qty: 28 TABLET | Refills: 3 | Status: SHIPPED | OUTPATIENT
Start: 2024-07-12 | End: 2024-09-06

## 2024-08-21 ENCOUNTER — PATIENT MESSAGE (OUTPATIENT)
Dept: INTERNAL MEDICINE | Facility: CLINIC | Age: 59
End: 2024-08-21
Payer: COMMERCIAL

## 2024-08-21 RX ORDER — TIZANIDINE 4 MG/1
TABLET ORAL
Qty: 30 TABLET | Refills: 0 | Status: SHIPPED | OUTPATIENT
Start: 2024-08-21

## 2024-08-21 NOTE — TELEPHONE ENCOUNTER
No care due was identified.  [facility] Embedded Care Due Messages. Reference number: [id number].   8/21/2024 11:19:57 AM CDT

## 2024-08-25 DIAGNOSIS — G89.29 CHRONIC MIDLINE LOW BACK PAIN WITH LEFT-SIDED SCIATICA: Primary | ICD-10-CM

## 2024-08-25 DIAGNOSIS — M54.42 CHRONIC MIDLINE LOW BACK PAIN WITH LEFT-SIDED SCIATICA: Primary | ICD-10-CM

## 2024-08-25 DIAGNOSIS — G43.119 INTRACTABLE MIGRAINE WITH AURA WITHOUT STATUS MIGRAINOSUS: ICD-10-CM

## 2024-08-30 ENCOUNTER — TELEPHONE (OUTPATIENT)
Dept: PAIN MEDICINE | Facility: CLINIC | Age: 59
End: 2024-08-30
Payer: COMMERCIAL

## 2024-08-30 ENCOUNTER — OFFICE VISIT (OUTPATIENT)
Dept: PAIN MEDICINE | Facility: CLINIC | Age: 59
End: 2024-08-30
Payer: COMMERCIAL

## 2024-08-30 VITALS
DIASTOLIC BLOOD PRESSURE: 80 MMHG | HEART RATE: 87 BPM | WEIGHT: 174.69 LBS | SYSTOLIC BLOOD PRESSURE: 124 MMHG | HEIGHT: 60 IN | BODY MASS INDEX: 34.3 KG/M2

## 2024-08-30 DIAGNOSIS — M46.1 BILATERAL SACROILIITIS: ICD-10-CM

## 2024-08-30 DIAGNOSIS — M43.06 SPONDYLOLYSIS OF LUMBAR REGION: ICD-10-CM

## 2024-08-30 DIAGNOSIS — M54.42 CHRONIC MIDLINE LOW BACK PAIN WITH LEFT-SIDED SCIATICA: ICD-10-CM

## 2024-08-30 DIAGNOSIS — M54.16 LUMBAR RADICULOPATHY: Primary | ICD-10-CM

## 2024-08-30 DIAGNOSIS — G89.29 CHRONIC MIDLINE LOW BACK PAIN WITH LEFT-SIDED SCIATICA: ICD-10-CM

## 2024-08-30 DIAGNOSIS — G43.119 INTRACTABLE MIGRAINE WITH AURA WITHOUT STATUS MIGRAINOSUS: ICD-10-CM

## 2024-08-30 DIAGNOSIS — G89.29 CHRONIC BILATERAL LOW BACK PAIN WITH LEFT-SIDED SCIATICA: ICD-10-CM

## 2024-08-30 DIAGNOSIS — M54.42 CHRONIC BILATERAL LOW BACK PAIN WITH LEFT-SIDED SCIATICA: ICD-10-CM

## 2024-08-30 PROCEDURE — 99999 PR PBB SHADOW E&M-EST. PATIENT-LVL IV: CPT | Mod: PBBFAC,,, | Performed by: STUDENT IN AN ORGANIZED HEALTH CARE EDUCATION/TRAINING PROGRAM

## 2024-08-30 NOTE — H&P (VIEW-ONLY)
[facility] - New Patient Evaluation    Referred by: Dr. Malissa Doe   Reason for referral: Chronic midline low back pain with left-sided sciatica  Intractable migraine with aura without status migrainosus     CC:   Chief Complaint   Patient presents with    Low-back Pain         8/30/2024    10:06 AM   Last 3 PDI Scores   Pain Disability Index (PDI) 43       Subjective 08/30/2024:   Jeannie Saavedra is a 58 y.o. female who presents complaining of lower back pain that radiates down the bilateral legs.    Initial Pain Assessment:  Location: lower back   Onset: years  Current Pain Score: 6/10  Daily Pain of Range: 7-8/10  Quality: Aching, Deep, and Sharp  Radiation: down both legs  Worsened by: nothing in particular  Improved by: laying down and medications     Patient denies night fever/night sweats, urinary incontinence, bowel incontinence, significant weight loss, significant motor weakness, and loss of sensations.      Previous Interventions:  -     Previous Therapies:  PT/OT:  No  Chiropractor:   HEP:  No  Relevant Surgery: no     Previous Medications:   - Tylenol or NSAIDS:   - Muscle Relaxants: Tizanidine    - TCAs:   - SNRIs:   - Topicals:   - Anticonvulsants:    - Opioids:   - Adjuvants:     Current Pain Medications:  Tizanidine       Review of Systems:  ROS    GENERAL:  No weight loss, malaise or fevers.  HEENT:   No recent changes in vision or hearing  NECK:  No difficulty with swallowing. No stridor.   RESPIRATORY:  Negative for cough, wheezing or shortness of breath, patient denies any recent URI.  CARDIOVASCULAR:  Negative for chest pain, leg swelling or palpitations.  GI:  Negative for abdominal discomfort, blood in stools or black stools or change in bowel habits.  MUSCULOSKELETAL:  See HPI.  SKIN:  Negative for lesions, rash, and itching.  PSYCH:  No mood disorder or recent psychosocial stressors.    HEMATOLOGY/LYMPHOLOGY:  Negative for prolonged bleeding, bruising  easily or swollen nodes.  Patient is not currently taking any anti-coagulants  NEURO:   No history of headaches, syncope, paralysis, seizures or tremors.  All other reviewed and negative other than HPI.    History:  Current medications, allergies, medical history, surgical history,   family history, and social history were reviewed in the chart as marked.    Full Medication List:    Current Outpatient Medications:     butalbital-acetaminophen-caffeine -40 mg (FIORICET, ESGIC) -40 mg per tablet, TAKE 1 TO 2 TABLETS BY MOUTH EVERY 4 TO 6 HOURS AS NEEDED FOR PAIN, Disp: 24 tablet, Rfl: 0    cyproheptadine (PERIACTIN) 4 mg tablet, Take 1 tablet (4 mg total) by mouth 2 (two) times daily as needed., Disp: 60 tablet, Rfl: 5    hydroquinone 4 % Crea, Apply to dark spots once daily. Use with sunscreen if outdoors, Disp: 28 g, Rfl: 1    ketoconazole (NIZORAL) 2 % cream, Apply topically once daily., Disp: 30 g, Rfl: 0    LIDOcaine (LIDODERM) 5 %, Place 1 patch onto the skin once daily., Disp: , Rfl:     ondansetron (ZOFRAN-ODT) 8 MG TbDL, Take 1 tablet (8 mg total) by mouth every 8 (eight) hours as needed (nausea)., Disp: 24 tablet, Rfl: 1    tiZANidine (ZANAFLEX) 4 MG tablet, TAKE 1 TABLET BY MOUTH EVERY 8 HOURS FOR 10 DAYS, Disp: 30 tablet, Rfl: 0    tretinoin (RETIN-A) 0.025 % cream, Apply pea-sized amount to entire face at bedtime.  If dryness, use every third night and increase as tolerated to every night., Disp: 20 g, Rfl: 6    valACYclovir (VALTREX) 500 MG tablet, Take 1 tablet (500 mg total) by mouth 2 (two) times daily., Disp: 28 tablet, Rfl: 3    amitriptyline (ELAVIL) 25 MG tablet, One po qhs for 2 weeks then 2 po qhs, Disp: 60 tablet, Rfl: 3    methocarbamoL (ROBAXIN) 750 MG Tab, One po qhs prn LBP, Disp: 15 tablet, Rfl: 1    rizatriptan (MAXALT-MLT) 10 MG disintegrating tablet, May repeat in 2 hours if needed. No more than 3 in 24 hours., Disp: 12 tablet, Rfl: 3     Allergies:  Patient has no known  allergies.     Medical History:   has a past medical history of Allergy, Asthma, Keloid cicatrix, Nephrolithiasis, Osteoarthritis of lumbar spine, and Sciatica.    Surgical History:   has a past surgical history that includes  section; Hysterectomy; Colonoscopy (N/A, 2018); Carpal tunnel release (Left, 2020); Injection of steroid (Right, 2020); and Colonoscopy (N/A, 2023).    Family History:  family history includes Alcohol abuse in her sister; Asthma in her son; Breast cancer in her paternal grandmother; Cancer in her maternal aunt and paternal grandmother; Depression in her sister; Heart disease in her father and mother; Hyperlipidemia in her mother; Hypertension in her mother; No Known Problems in her brother and daughter.    Social History:   reports that she has never smoked. She has never used smokeless tobacco. She reports that she does not currently use alcohol. She reports that she does not use drugs.    Physical Exam:  Vitals:    24 1005   BP: 124/80   Pulse: 87   Weight: 79.2 kg (174 lb 11.4 oz)   Height: 5' (1.524 m)   PainSc:   6   PainLoc: Back       GENERAL: Well appearing, in no acute distress, alert and oriented x3.  PSYCH:  Mood and affect appropriate.  SKIN: Skin color, texture, turgor normal, no rashes or lesions.  HEAD/FACE:  Normocephalic, atraumatic. Cranial nerves grossly intact.  NECK: Normal ROM. Supple.   CV: RRR with palpation of the radial artery.  PULM: No evidence of respiratory difficulty, symmetric chest rise.  GI:  Soft and non-distended.  MSK: Straight leg raising is positive to radicular pain. + pain to palpation over the facet joints of the lumbar spine. + pain with lumbar facet loading. + pain over the SI joints. Sacral Thrust is positive.  SHRUTI test is positive.  Gaenslen Test is positive.  No pain over the GBT bilaterally.  Normal range of motion of the lumbar spine.   Peripheral joint ROM is full and pain free without obvious instability or  laxity in all four extremities. No obvious deformities, edema, or skin discoloration.  No atrophy or tone abnormalities are noted.   NEURO: Bilateral upper and lower extremity coordination and strength is symmetric.  No loss of sensation is noted.  MENTAL STATUS: A x O x 3, good concentration, speech is fluent and goal directed  MOTOR: 5/5 in all bilateral lower extremity muscle groups  GAIT: Normal. Ambulates unassisted.    Imaging 08/25/20:  MRI Lumbar Spine Without Contrast  Order: [id number]  Status: Final result       Visible to patient: Yes (seen)       Next appt: None       Dx: Dorsalgia, unspecified    1 Result Note       1 Patient Communication       1 Follow-up Encounter  Details    Reading Physician Reading Date Result Priority   Bell Pierce MD  [phone] 8/25/2020 Routine     Narrative & Impression  EXAMINATION:  MRI LUMBAR SPINE WITHOUT CONTRAST     CLINICAL HISTORY:  Back pain or radiculopathy, > 6 wks; Dorsalgia, unspecified     TECHNIQUE:  Multiplanar, multisequence MR images were acquired from the thoracolumbar junction to the sacrum without the administration of contrast.     COMPARISON:  08/12/2011     FINDINGS:  The alignment of the lumbar spine demonstrate a subtle anterolisthesis of L5 relative to S1.  The vertebral body heights are well maintained.  There is moderate disc space narrowing at L5-S1.  No evidence of malignant bone marrow replacement process or infection.  The conus medullaris terminates in good position.     T12-L1: Unremarkable     L1-L2: Unremarkable     L2-L3: Subtle diffuse disc bulge, posterior annular fissure.  No canal stenosis, no foraminal narrowing.  The facet joints appear normal.     L3-L4: No significant disc abnormality, no canal stenosis or foraminal narrowing.  There is mild right facet joint degenerative change.     L4-L5: There is no disc abnormality, no canal stenosis or foraminal narrowing.  There is mild facet joint osseous hypertrophy  bilaterally.     L5-S1: There is uncovering of the disc due to the anterolisthesis and mild disc bulge.  No canal stenosis.  Moderate facet joint osseous hypertrophy bilaterally.  Mild bilateral foraminal narrowing.     The upper sacrum and sacroiliac joints appear normal.  There is a left kidney lesion is measuring 2.9 cm possibly a cyst, this could be confirmed by ultrasound.     Impression:     Mild degenerative disc disease L5-S1 has slightly progressed compared to the prior exam.     No significant canal stenosis.     Left kidney lesion is suggestive of a cyst, this could be confirmed by ultrasound.        Electronically signed by:Bell Pierce MD  Date:                                            08/25/2020  Time:                                           15:55           Exam Ended: 08/25/20 15:33 CDT Last Resulted: 08/25/20 15:55 CDT                 Labs:  BMP  Lab Results   Component Value Date     05/27/2024    K 4.5 05/27/2024     05/27/2024    CO2 27 05/27/2024    BUN 16 05/27/2024    CREATININE 0.8 05/27/2024    CALCIUM 9.4 05/27/2024    ANIONGAP 6 (L) 05/27/2024    EGFRNORACEVR >60.0 05/27/2024     Lab Results   Component Value Date    ALT 33 05/27/2024    AST 39 05/27/2024    ALKPHOS 54 (L) 05/27/2024    BILITOT 0.3 05/27/2024     Lab Results   Component Value Date    WBC 4.58 05/27/2024    HGB 12.6 05/27/2024    HCT 39.2 05/27/2024    MCV 89 05/27/2024     05/27/2024           Assessment:  Problem List Items Addressed This Visit          Orthopedic    Chronic lower back pain    Relevant Orders    Ambulatory referral/consult to Physical/Occupational Therapy     Other Visit Diagnoses       Lumbar radiculopathy    -  Primary    Relevant Orders    MRI Lumbar Spine Without Contrast    Procedure Order to Pain Management    Ambulatory referral/consult to Physical/Occupational Therapy    Intractable migraine with aura without status migrainosus        Spondylolysis of lumbar region         Relevant Orders    Ambulatory referral/consult to Physical/Occupational Therapy    Bilateral sacroiliitis                08/30/2024 - Jeannie Saavedra is a 58 y.o. female who  has a past medical history of Allergy, Asthma, Keloid cicatrix, Nephrolithiasis, Osteoarthritis of lumbar spine, and Sciatica.  By history and examination this patient has chronic low back pain with radiculopathy on the left.  She appears to have multiple pain generators on exam including SI joint pain, lumbar facet pain, and radiculopathy pain.  I will order lumbar imaging to further assess her pain.  Lumbar MRI ordered.  We discussed the underlying diagnoses and multiple treatment options including non-opioid medications, interventional procedures, physical therapy, home exercise, and core muscle enhancement.  I will schedule the patient for bilateral SI joint injections.  In the future she may benefit from facet injections/RFA +/-lumbar epidural steroid injections.  Referral placed to physical therapy.  The risks and benefits of each treatment option were discussed and all questions were answered.      Treatment Plan:   Procedures:  Schedule patient for SI joint injections bilaterally.  In the future she may also benefit from facet injections/MBB versus lumbar epidural steroid injection pending review of MRI.  PT/OT/HEP: I have stressed the importance of physical activity and a home exercise plan to help with pain and improve health.  Referral placed to physical therapy.  Home exercise regimen provided to patient today in clinic  Medications:    - meloxicam q.d. p.r.n..  Patient states she has this medication at home.   - Tylenol p.r.n.   -  Reviewed and consistent with medication use as prescribed.  Imaging:  Lumbar MRI ordered.  Follow Up: RTC after MRI performed to review results and 2 weeks post procedure     Deedee Hernandes DO   Interventional Pain Medicine / Anesthesiology    Disclaimer: This note was partly generated  using dictation software which may occasionally result in transcription errors.

## 2024-08-30 NOTE — PROGRESS NOTES
[facility] - New Patient Evaluation    Referred by: Dr. Malissa Doe   Reason for referral: Chronic midline low back pain with left-sided sciatica  Intractable migraine with aura without status migrainosus     CC:   Chief Complaint   Patient presents with    Low-back Pain         8/30/2024    10:06 AM   Last 3 PDI Scores   Pain Disability Index (PDI) 43       Subjective 08/30/2024:   Jeannie Saavedra is a 58 y.o. female who presents complaining of lower back pain that radiates down the bilateral legs.    Initial Pain Assessment:  Location: lower back   Onset: years  Current Pain Score: 6/10  Daily Pain of Range: 7-8/10  Quality: Aching, Deep, and Sharp  Radiation: down both legs  Worsened by: nothing in particular  Improved by: laying down and medications     Patient denies night fever/night sweats, urinary incontinence, bowel incontinence, significant weight loss, significant motor weakness, and loss of sensations.      Previous Interventions:  -     Previous Therapies:  PT/OT:  No  Chiropractor:   HEP:  No  Relevant Surgery: no     Previous Medications:   - Tylenol or NSAIDS:   - Muscle Relaxants: Tizanidine    - TCAs:   - SNRIs:   - Topicals:   - Anticonvulsants:    - Opioids:   - Adjuvants:     Current Pain Medications:  Tizanidine       Review of Systems:  ROS    GENERAL:  No weight loss, malaise or fevers.  HEENT:   No recent changes in vision or hearing  NECK:  No difficulty with swallowing. No stridor.   RESPIRATORY:  Negative for cough, wheezing or shortness of breath, patient denies any recent URI.  CARDIOVASCULAR:  Negative for chest pain, leg swelling or palpitations.  GI:  Negative for abdominal discomfort, blood in stools or black stools or change in bowel habits.  MUSCULOSKELETAL:  See HPI.  SKIN:  Negative for lesions, rash, and itching.  PSYCH:  No mood disorder or recent psychosocial stressors.    HEMATOLOGY/LYMPHOLOGY:  Negative for prolonged bleeding, bruising  easily or swollen nodes.  Patient is not currently taking any anti-coagulants  NEURO:   No history of headaches, syncope, paralysis, seizures or tremors.  All other reviewed and negative other than HPI.    History:  Current medications, allergies, medical history, surgical history,   family history, and social history were reviewed in the chart as marked.    Full Medication List:    Current Outpatient Medications:     butalbital-acetaminophen-caffeine -40 mg (FIORICET, ESGIC) -40 mg per tablet, TAKE 1 TO 2 TABLETS BY MOUTH EVERY 4 TO 6 HOURS AS NEEDED FOR PAIN, Disp: 24 tablet, Rfl: 0    cyproheptadine (PERIACTIN) 4 mg tablet, Take 1 tablet (4 mg total) by mouth 2 (two) times daily as needed., Disp: 60 tablet, Rfl: 5    hydroquinone 4 % Crea, Apply to dark spots once daily. Use with sunscreen if outdoors, Disp: 28 g, Rfl: 1    ketoconazole (NIZORAL) 2 % cream, Apply topically once daily., Disp: 30 g, Rfl: 0    LIDOcaine (LIDODERM) 5 %, Place 1 patch onto the skin once daily., Disp: , Rfl:     ondansetron (ZOFRAN-ODT) 8 MG TbDL, Take 1 tablet (8 mg total) by mouth every 8 (eight) hours as needed (nausea)., Disp: 24 tablet, Rfl: 1    tiZANidine (ZANAFLEX) 4 MG tablet, TAKE 1 TABLET BY MOUTH EVERY 8 HOURS FOR 10 DAYS, Disp: 30 tablet, Rfl: 0    tretinoin (RETIN-A) 0.025 % cream, Apply pea-sized amount to entire face at bedtime.  If dryness, use every third night and increase as tolerated to every night., Disp: 20 g, Rfl: 6    valACYclovir (VALTREX) 500 MG tablet, Take 1 tablet (500 mg total) by mouth 2 (two) times daily., Disp: 28 tablet, Rfl: 3    amitriptyline (ELAVIL) 25 MG tablet, One po qhs for 2 weeks then 2 po qhs, Disp: 60 tablet, Rfl: 3    methocarbamoL (ROBAXIN) 750 MG Tab, One po qhs prn LBP, Disp: 15 tablet, Rfl: 1    rizatriptan (MAXALT-MLT) 10 MG disintegrating tablet, May repeat in 2 hours if needed. No more than 3 in 24 hours., Disp: 12 tablet, Rfl: 3     Allergies:  Patient has no known  allergies.     Medical History:   has a past medical history of Allergy, Asthma, Keloid cicatrix, Nephrolithiasis, Osteoarthritis of lumbar spine, and Sciatica.    Surgical History:   has a past surgical history that includes  section; Hysterectomy; Colonoscopy (N/A, 2018); Carpal tunnel release (Left, 2020); Injection of steroid (Right, 2020); and Colonoscopy (N/A, 2023).    Family History:  family history includes Alcohol abuse in her sister; Asthma in her son; Breast cancer in her paternal grandmother; Cancer in her maternal aunt and paternal grandmother; Depression in her sister; Heart disease in her father and mother; Hyperlipidemia in her mother; Hypertension in her mother; No Known Problems in her brother and daughter.    Social History:   reports that she has never smoked. She has never used smokeless tobacco. She reports that she does not currently use alcohol. She reports that she does not use drugs.    Physical Exam:  Vitals:    24 1005   BP: 124/80   Pulse: 87   Weight: 79.2 kg (174 lb 11.4 oz)   Height: 5' (1.524 m)   PainSc:   6   PainLoc: Back       GENERAL: Well appearing, in no acute distress, alert and oriented x3.  PSYCH:  Mood and affect appropriate.  SKIN: Skin color, texture, turgor normal, no rashes or lesions.  HEAD/FACE:  Normocephalic, atraumatic. Cranial nerves grossly intact.  NECK: Normal ROM. Supple.   CV: RRR with palpation of the radial artery.  PULM: No evidence of respiratory difficulty, symmetric chest rise.  GI:  Soft and non-distended.  MSK: Straight leg raising is positive to radicular pain. + pain to palpation over the facet joints of the lumbar spine. + pain with lumbar facet loading. + pain over the SI joints. Sacral Thrust is positive.  SHRUTI test is positive.  Gaenslen Test is positive.  No pain over the GBT bilaterally.  Normal range of motion of the lumbar spine.   Peripheral joint ROM is full and pain free without obvious instability or  laxity in all four extremities. No obvious deformities, edema, or skin discoloration.  No atrophy or tone abnormalities are noted.   NEURO: Bilateral upper and lower extremity coordination and strength is symmetric.  No loss of sensation is noted.  MENTAL STATUS: A x O x 3, good concentration, speech is fluent and goal directed  MOTOR: 5/5 in all bilateral lower extremity muscle groups  GAIT: Normal. Ambulates unassisted.    Imaging 08/25/20:  MRI Lumbar Spine Without Contrast  Order: [id number]  Status: Final result       Visible to patient: Yes (seen)       Next appt: None       Dx: Dorsalgia, unspecified    1 Result Note       1 Patient Communication       1 Follow-up Encounter  Details    Reading Physician Reading Date Result Priority   Bell Pierce MD  [phone] 8/25/2020 Routine     Narrative & Impression  EXAMINATION:  MRI LUMBAR SPINE WITHOUT CONTRAST     CLINICAL HISTORY:  Back pain or radiculopathy, > 6 wks; Dorsalgia, unspecified     TECHNIQUE:  Multiplanar, multisequence MR images were acquired from the thoracolumbar junction to the sacrum without the administration of contrast.     COMPARISON:  08/12/2011     FINDINGS:  The alignment of the lumbar spine demonstrate a subtle anterolisthesis of L5 relative to S1.  The vertebral body heights are well maintained.  There is moderate disc space narrowing at L5-S1.  No evidence of malignant bone marrow replacement process or infection.  The conus medullaris terminates in good position.     T12-L1: Unremarkable     L1-L2: Unremarkable     L2-L3: Subtle diffuse disc bulge, posterior annular fissure.  No canal stenosis, no foraminal narrowing.  The facet joints appear normal.     L3-L4: No significant disc abnormality, no canal stenosis or foraminal narrowing.  There is mild right facet joint degenerative change.     L4-L5: There is no disc abnormality, no canal stenosis or foraminal narrowing.  There is mild facet joint osseous hypertrophy  bilaterally.     L5-S1: There is uncovering of the disc due to the anterolisthesis and mild disc bulge.  No canal stenosis.  Moderate facet joint osseous hypertrophy bilaterally.  Mild bilateral foraminal narrowing.     The upper sacrum and sacroiliac joints appear normal.  There is a left kidney lesion is measuring 2.9 cm possibly a cyst, this could be confirmed by ultrasound.     Impression:     Mild degenerative disc disease L5-S1 has slightly progressed compared to the prior exam.     No significant canal stenosis.     Left kidney lesion is suggestive of a cyst, this could be confirmed by ultrasound.        Electronically signed by:Bell Pierce MD  Date:                                            08/25/2020  Time:                                           15:55           Exam Ended: 08/25/20 15:33 CDT Last Resulted: 08/25/20 15:55 CDT                 Labs:  BMP  Lab Results   Component Value Date     05/27/2024    K 4.5 05/27/2024     05/27/2024    CO2 27 05/27/2024    BUN 16 05/27/2024    CREATININE 0.8 05/27/2024    CALCIUM 9.4 05/27/2024    ANIONGAP 6 (L) 05/27/2024    EGFRNORACEVR >60.0 05/27/2024     Lab Results   Component Value Date    ALT 33 05/27/2024    AST 39 05/27/2024    ALKPHOS 54 (L) 05/27/2024    BILITOT 0.3 05/27/2024     Lab Results   Component Value Date    WBC 4.58 05/27/2024    HGB 12.6 05/27/2024    HCT 39.2 05/27/2024    MCV 89 05/27/2024     05/27/2024           Assessment:  Problem List Items Addressed This Visit          Orthopedic    Chronic lower back pain    Relevant Orders    Ambulatory referral/consult to Physical/Occupational Therapy     Other Visit Diagnoses       Lumbar radiculopathy    -  Primary    Relevant Orders    MRI Lumbar Spine Without Contrast    Procedure Order to Pain Management    Ambulatory referral/consult to Physical/Occupational Therapy    Intractable migraine with aura without status migrainosus        Spondylolysis of lumbar region         Relevant Orders    Ambulatory referral/consult to Physical/Occupational Therapy    Bilateral sacroiliitis                08/30/2024 - Jeannie Saavedra is a 58 y.o. female who  has a past medical history of Allergy, Asthma, Keloid cicatrix, Nephrolithiasis, Osteoarthritis of lumbar spine, and Sciatica.  By history and examination this patient has chronic low back pain with radiculopathy on the left.  She appears to have multiple pain generators on exam including SI joint pain, lumbar facet pain, and radiculopathy pain.  I will order lumbar imaging to further assess her pain.  Lumbar MRI ordered.  We discussed the underlying diagnoses and multiple treatment options including non-opioid medications, interventional procedures, physical therapy, home exercise, and core muscle enhancement.  I will schedule the patient for bilateral SI joint injections.  In the future she may benefit from facet injections/RFA +/-lumbar epidural steroid injections.  Referral placed to physical therapy.  The risks and benefits of each treatment option were discussed and all questions were answered.      Treatment Plan:   Procedures:  Schedule patient for SI joint injections bilaterally.  In the future she may also benefit from facet injections/MBB versus lumbar epidural steroid injection pending review of MRI.  PT/OT/HEP: I have stressed the importance of physical activity and a home exercise plan to help with pain and improve health.  Referral placed to physical therapy.  Home exercise regimen provided to patient today in clinic  Medications:    - meloxicam q.d. p.r.n..  Patient states she has this medication at home.   - Tylenol p.r.n.   -  Reviewed and consistent with medication use as prescribed.  Imaging:  Lumbar MRI ordered.  Follow Up: RTC after MRI performed to review results and 2 weeks post procedure     Deedee Hernandes DO   Interventional Pain Medicine / Anesthesiology    Disclaimer: This note was partly generated  using dictation software which may occasionally result in transcription errors.

## 2024-08-30 NOTE — TELEPHONE ENCOUNTER
----- Message from Pamela Hernandes DO sent at 2024 10:43 AM CDT -----  Regarding: Order for ANGELA GRACE    Patient Name: ANGELA GRACE([id number])  Sex: Female  : 1965      PCP: ALTON MENDEZ    Center: [facility] CENTRAL BILLING OFFICE     Types of orders made on 2024: Imaging, Outpatient Referral, Procedure                                       Request    Order Date:2024  Ordering User:PAMELA HERNANDES [[id number]]  Encounter Provider:Pamela Hernandes DO [[id number]]  Authorizing Provider: Pamela Hernandes DO [[id number]]  Department:[facility] PAIN MANAGEMENT[[id number]]    Common Order Information  Procedure -> Sacroiliac Injection (Specify laterality) Cmt: b/l SIJ    Pre-op Diagnosis -> Bilateral sacroiliitis     Order Specific Information  Order: Procedure Order to Pain Management [Custom: [id number]]  Order #:          [id number]  Qty: 1 FUTURE    Priority: Routine  Class: Clinic Performed    Future Order Information      Expires on:2025            Expected by:2024                   Associated Diagnoses      M54.16 Lumbar radiculopathy      Physician -> gelter         Is patient on anti-coagulants? -> No         Facility Name: -> [facility]           Priority: Routine  Class: Clinic Performed    Future Order   Information      Expires on:2025            Expected by:2024                   Associated Diagnoses      M54.16 Lumbar radiculopathy      Procedure -> Sacroiliac Injection (Specify laterality) Cmt: b/l SIJ        Physician -> gelter         Is patient on anti-coagulants? -> No         Pre-op Diagnosis -> Bilateral sacroiliitis         Facility Name: -> [facility]

## 2024-08-30 NOTE — TELEPHONE ENCOUNTER
----- Message from Pamela Hernandes DO sent at 2024 10:43 AM CDT -----  Regarding: Order for ANGELA GRACE    Patient Name: ANGELA GRACE([id number])  Sex: Female  : 1965      PCP: ALTON MENDEZ    Center: [facility] CENTRAL BILLING OFFICE     Types of orders made on 2024: Imaging, Outpatient Referral, Procedure                                       Request    Order Date:2024  Ordering User:PAMELA HERNANDES [[id number]]  Encounter Provider:Pamela Hernandes DO [[id number]]  Authorizing Provider: Pamela Hernandes DO [[id number]]  Department:[facility] PAIN MANAGEMENT[[id number]]    Common Order Information  Procedure -> Sacroiliac Injection (Specify laterality) Cmt: b/l SIJ    Pre-op Diagnosis -> Bilateral sacroiliitis     Order Specific Information  Order: Procedure Order to Pain Management [Custom: [id number]]  Order #:          [id number]  Qty: 1 FUTURE    Priority: Routine  Class: Clinic Performed    Future Order Information      Expires on:2025            Expected by:2024                   Associated Diagnoses      M54.16 Lumbar radiculopathy      Physician -> gelter         Is patient on anti-coagulants? -> No         Facility Name: -> [facility]           Priority: Routine  Class: Clinic Performed    Future Order   Information      Expires on:2025            Expected by:2024                   Associated Diagnoses      M54.16 Lumbar radiculopathy      Procedure -> Sacroiliac Injection (Specify laterality) Cmt: b/l SIJ        Physician -> gelter         Is patient on anti-coagulants? -> No         Pre-op Diagnosis -> Bilateral sacroiliitis         Facility Name: -> [facility]

## 2024-09-06 ENCOUNTER — PATIENT MESSAGE (OUTPATIENT)
Dept: PAIN MEDICINE | Facility: CLINIC | Age: 59
End: 2024-09-06
Payer: COMMERCIAL

## 2024-09-06 RX ORDER — PREGABALIN 50 MG/1
50 CAPSULE ORAL 3 TIMES DAILY
Qty: 90 CAPSULE | Refills: 0 | Status: SHIPPED | OUTPATIENT
Start: 2024-09-06 | End: 2024-10-06

## 2024-09-07 ENCOUNTER — HOSPITAL ENCOUNTER (OUTPATIENT)
Dept: RADIOLOGY | Facility: HOSPITAL | Age: 59
Discharge: HOME | End: 2024-09-07
Attending: STUDENT IN AN ORGANIZED HEALTH CARE EDUCATION/TRAINING PROGRAM
Payer: COMMERCIAL

## 2024-09-07 DIAGNOSIS — M54.16 LUMBAR RADICULOPATHY: ICD-10-CM

## 2024-09-07 PROCEDURE — 72148 MRI LUMBAR SPINE W/O DYE: CPT | Mod: TC

## 2024-09-07 PROCEDURE — 72148 MRI LUMBAR SPINE W/O DYE: CPT | Mod: 26,,, | Performed by: RADIOLOGY

## 2024-09-10 ENCOUNTER — TELEPHONE (OUTPATIENT)
Dept: PAIN MEDICINE | Facility: CLINIC | Age: 59
End: 2024-09-10
Payer: COMMERCIAL

## 2024-09-16 NOTE — PRE-PROCEDURE INSTRUCTIONS
Patient reviewed on 9/16/2024.  Okay to proceed at [facility]. The following pre-procedure instructions and arrival time have been sent to patient portal for review.  Patient confirmed receiving pre-procedure instructions via [employer] portal.      Dear Jeannie ,     Please read over the following pre-procedure instructions in it's entirety as there is helpful information here to get you well prepared for your upcoming procedure.              You are scheduled for a procedure with Dr. Hernandes on 9/18/2024.      Your scheduled arrival time is 11:30 am.  This arrival time is roughly 1 hour before your anticipated procedure time to allow sufficient time for pre-op..     Please wear comfortable clothes. You will be placed in a gown for your procedure.  Please do not wear a dress.  This procedure will take place at the [facility] at the corner of [facility] and [facility].  It is in the [place] next to [facility].  The address is:     [address] [place] [address].  [place] [address]     After entering the building, you will proceed to the second floor where you can check in with registration. You should take any medications that you routinely take for blood pressure, heart medications, thyroid, cholesterol, etc.      The fasting restrictions are dependent on whether or not you are receiving sedation.  Sedation is not available for all procedures.      Your fasting instructions are as follow:  Oral Sedation. You do not need to fast before this procedure.  You can eat and drink like normal.  You CANNOT drive yourself and must have a .     If you are on blood thinners, you need to follow the anticoagulation instructions that had been discussed previously.  You should only stop the blood thinners if it was approved by your primary care physician or your cardiologist.  In the event that you are not able to stop your blood thinners, a blood thinner was not listed on your medication list,  or we were not able to get clearance from your cardiologist, then the procedure may have to be postponed/canceled.      IF you were told to stop your blood thinners, this is how long you should generally hold some of the more common ones.  Remember that stopping blood thinners is only necessary for certain procedures. If you are unsure of your instructions, please call us.   Aspirin - 5 days  Plavix/Clopidogrel - 7 days  Warfarin / Coumadin - 5 days  Eliquis - 3 days  Pradaxa/Dabigatran - 4 days  Xarelto/Rivaroxaban - 3 days     If you are a diabetic, do not take your medication if you will be fasting, but bring it with you. Please plan on being here for roughly 3 hours.     Please call us if you have been sick (running fever, having any flu-like symptoms) or have been taking ANTIBIOTICS in the past 2 weeks or had any outpatient procedures other than with us (colonoscopy, endoscopy, OBGYN, dental, etc.).     If you have been previously COVID positive, you will need to hold off on your procedure until you are symptom free for 10 days. If you did not have any symptoms, you can have your procedure 10 days from your positive test result.       On the morning of your procedure:  *HOLD ALL VITAMINS, MINERALS, HERBS (INCLUDING HERBAL TEAS) AND SUPPLEMENTS  *SHOWER WITH ANTIBACTERIAL SOAP (EX. DIAL) NIGHT BEFORE AND MORNING OF PROCEDURE  *DO NOT APPLY ANY LOTIONS, OILS, POWDERS, PERFUME/COLOGNE, OINTMENTS, GELS, CREAMS, MAKEUP OR DEODORANT TO YOUR SKIN MORNING OF PROCEDURE  *LEAVE JEWELRY AND ANY VALUABLES AT HOME  *WEAR LOOSE COMFORTABLE CLOTHING (PREFERABLY A BUTTON UP SHIRT)     Please reply to this message as receipt of delivery.           Thank you,  [facility] &  Nancy, LPN [facility]  Pre-Admit

## 2024-09-18 ENCOUNTER — HOSPITAL ENCOUNTER (OUTPATIENT)
Facility: HOSPITAL | Age: 59
Discharge: HOME | End: 2024-09-18
Attending: STUDENT IN AN ORGANIZED HEALTH CARE EDUCATION/TRAINING PROGRAM | Admitting: STUDENT IN AN ORGANIZED HEALTH CARE EDUCATION/TRAINING PROGRAM
Payer: COMMERCIAL

## 2024-09-18 VITALS
BODY MASS INDEX: 35.34 KG/M2 | HEART RATE: 74 BPM | OXYGEN SATURATION: 100 % | DIASTOLIC BLOOD PRESSURE: 84 MMHG | SYSTOLIC BLOOD PRESSURE: 134 MMHG | TEMPERATURE: 98 F | RESPIRATION RATE: 16 BRPM | WEIGHT: 180 LBS | HEIGHT: 60 IN

## 2024-09-18 DIAGNOSIS — G89.29 CHRONIC PAIN: ICD-10-CM

## 2024-09-18 DIAGNOSIS — M46.1 BILATERAL SACROILIITIS: Primary | ICD-10-CM

## 2024-09-18 PROCEDURE — 27096 INJECT SACROILIAC JOINT: CPT | Mod: 50 | Performed by: STUDENT IN AN ORGANIZED HEALTH CARE EDUCATION/TRAINING PROGRAM

## 2024-09-18 PROCEDURE — 25500020 PHARM REV CODE 255: Performed by: STUDENT IN AN ORGANIZED HEALTH CARE EDUCATION/TRAINING PROGRAM

## 2024-09-18 PROCEDURE — 27096 INJECT SACROILIAC JOINT: CPT | Mod: 50,,, | Performed by: STUDENT IN AN ORGANIZED HEALTH CARE EDUCATION/TRAINING PROGRAM

## 2024-09-18 PROCEDURE — 63600175 PHARM REV CODE 636 W HCPCS: Performed by: STUDENT IN AN ORGANIZED HEALTH CARE EDUCATION/TRAINING PROGRAM

## 2024-09-18 PROCEDURE — 25000003 PHARM REV CODE 250: Performed by: STUDENT IN AN ORGANIZED HEALTH CARE EDUCATION/TRAINING PROGRAM

## 2024-09-18 RX ORDER — BUPIVACAINE HYDROCHLORIDE 2.5 MG/ML
INJECTION, SOLUTION EPIDURAL; INFILTRATION; INTRACAUDAL
Status: DISCONTINUED | OUTPATIENT
Start: 2024-09-18 | End: 2024-09-18 | Stop reason: HOSPADM

## 2024-09-18 RX ORDER — ALPRAZOLAM 0.5 MG/1
0.5 TABLET, ORALLY DISINTEGRATING ORAL
Status: DISCONTINUED | OUTPATIENT
Start: 2024-09-18 | End: 2024-09-18 | Stop reason: HOSPADM

## 2024-09-18 RX ORDER — TRIAMCINOLONE ACETONIDE 40 MG/ML
INJECTION, SUSPENSION INTRA-ARTICULAR; INTRAMUSCULAR
Status: DISCONTINUED | OUTPATIENT
Start: 2024-09-18 | End: 2024-09-18 | Stop reason: HOSPADM

## 2024-09-18 RX ORDER — LIDOCAINE HYDROCHLORIDE 20 MG/ML
INJECTION, SOLUTION EPIDURAL; INFILTRATION; INTRACAUDAL; PERINEURAL
Status: DISCONTINUED | OUTPATIENT
Start: 2024-09-18 | End: 2024-09-18 | Stop reason: HOSPADM

## 2024-09-18 RX ADMIN — ALPRAZOLAM 0.5 MG: 0.5 TABLET, ORALLY DISINTEGRATING ORAL at 11:09

## 2024-09-18 NOTE — PLAN OF CARE
Pt in preop bay 24, VSS and IV inserted. Pt denies any open wounds on body or the use of any weight loss injections. Pt needs an  updated H&P and procedural consents.

## 2024-09-18 NOTE — DISCHARGE SUMMARY
Discharge Note  Short Stay      SUMMARY     Admit Date: 9/18/2024    Attending Physician: Deedee Hernandes      Discharge Physician: Deedee Hernandes      Discharge Date: 9/18/2024 12:55 PM    Procedure(s) (LRB):  B/L SIJ (Bilateral)    Final Diagnosis: Lumbar radiculopathy [M54.16]    Disposition: Home or self care    Patient Instructions:   Current Discharge Medication List        CONTINUE these medications which have NOT CHANGED    Details   pregabalin (LYRICA) 50 MG capsule Take 1 capsule (50 mg total) by mouth 3 (three) times daily. Start with 1 capsule nightly for the first three nights, then gradually increase to 3 times daily if tolerated.  Qty: 90 capsule, Refills: 0      tiZANidine (ZANAFLEX) 4 MG tablet TAKE 1 TABLET BY MOUTH EVERY 8 HOURS FOR 10 DAYS  Qty: 30 tablet, Refills: 0      amitriptyline (ELAVIL) 25 MG tablet One po qhs for 2 weeks then 2 po qhs  Qty: 60 tablet, Refills: 3      butalbital-acetaminophen-caffeine -40 mg (FIORICET, ESGIC) -40 mg per tablet TAKE 1 TO 2 TABLETS BY MOUTH EVERY 4 TO 6 HOURS AS NEEDED FOR PAIN  Qty: 24 tablet, Refills: 0      cyproheptadine (PERIACTIN) 4 mg tablet Take 1 tablet (4 mg total) by mouth 2 (two) times daily as needed.  Qty: 60 tablet, Refills: 5      hydroquinone 4 % Crea Apply to dark spots once daily. Use with sunscreen if outdoors  Qty: 28 g, Refills: 1    Comments: Compound hydroquinone 8% cream  Associated Diagnoses: Melasma      ketoconazole (NIZORAL) 2 % cream Apply topically once daily.  Qty: 30 g, Refills: 0      LIDOcaine (LIDODERM) 5 % Place 1 patch onto the skin once daily.      methocarbamoL (ROBAXIN) 750 MG Tab One po qhs prn LBP  Qty: 15 tablet, Refills: 1      ondansetron (ZOFRAN-ODT) 8 MG TbDL Take 1 tablet (8 mg total) by mouth every 8 (eight) hours as needed (nausea).  Qty: 24 tablet, Refills: 1      rizatriptan (MAXALT-MLT) 10 MG disintegrating tablet May repeat in 2 hours if needed. No more than 3 in 24 hours.  Qty: 12  tablet, Refills: 3      tretinoin (RETIN-A) 0.025 % cream Apply pea-sized amount to entire face at bedtime.  If dryness, use every third night and increase as tolerated to every night.  Qty: 20 g, Refills: 6    Associated Diagnoses: Acne vulgaris      valACYclovir (VALTREX) 500 MG tablet Take 1 tablet (500 mg total) by mouth 2 (two) times daily.  Qty: 28 tablet, Refills: 3    Associated Diagnoses: Herpes genitalis in women                 Discharge Diagnosis: Lumbar radiculopathy [M54.16]  Condition on Discharge: Stable with no complications to procedure   Diet on Discharge: Same as before.  Activity: as per instruction sheet.  Discharge to: Home with a responsible adult.  Follow up: 2-4 weeks       Please call my office or pager at [phone] if experienced any weakness or loss of sensation, fever > 101.5, pain uncontrolled with oral medications, persistent nausea/vomiting/or diarrhea, redness or drainage from the incisions, or any other worrisome concerns. If physician on call was not reached or could not communicate with our office for any reason please go to the nearest emergency department

## 2024-09-18 NOTE — OP NOTE
Sacroiliac Joint Injection under Fluoroscopic Guidance    The procedure, risks, benefits, and options were discussed with the patient. There are no contraindications to the procedure. The patent expressed understanding and agreed to the procedure. Informed written consent was obtained prior to the start of the procedure and can be found in the patient's chart.    PATIENT NAME: Jeannie Saavedra   MRN: [id number]     DATE OF PROCEDURE: 09/18/2024    PROCEDURE: Bilateral Sacroiliac Joint Injection under Fluoroscopic Guidance    PRE-OP DIAGNOSIS: Lumbar radiculopathy [M54.16]    POST-OP DIAGNOSIS: Same    PHYSICIAN: Deedee Hernandes DO    ASSISTANTS: Arvind Todd MD  [facility]     MEDICATIONS INJECTED: Preservative-free Kenalog 40mg with 7 cc of Bupivacine 0.25%     LOCAL ANESTHETIC INJECTED: Xylocaine 2%     SEDATION: None    ESTIMATED BLOOD LOSS: None    COMPLICATIONS: None    TECHNIQUE: Time-out was performed to identify the patient and procedure to be performed. With the patient laying in a prone position, the surgical area was prepped and draped in the usual sterile fashion using ChloraPrep and a fenestrated drape. The sacroiliac joint was determined under fluoroscopy guidance. Skin anesthesia was achieved by injecting Lidocaine 2% over the injection site. The sacroiliac joint was  then approached with a 22 gauge, 3.5 inch spinal quinke needle that was introduced under fluoroscopic guidance in the AP and Lateral views. Once the needle tip was in the area of the joint, and there was no blood, contrast dye Omnipaque (300mg/mL) was injected to confirm placement and there was no vascular runoff. Fluoroscopic imaging in the AP and lateral views revealed a clear outline of the joint space. 4 mL of the medication mixture listed above was injected slowly intraarticular and phillip-articular. Displacement of the radio opaque contrast after injection of the medication confirmed that the medication went into the area of  the joint. The needles were removed and bleeding was nil. The same procedure was repeated on the contralateral side. A sterile dressing was applied. No specimens collected. The patient tolerated the procedure well.     The patient was monitored after the procedure in the recovery area. They were given post-procedure and discharge instructions to follow at home. The patient was discharged in a stable condition.    I reviewed and edited the fellow's note. I conducted my own interview and physical examination. I agree with the findings. I was present and supervising all critical portions of the procedure.    Arvind Todd MD

## 2024-09-18 NOTE — PLAN OF CARE
Jeannie Saavedra has met all discharge criteria from Phase II. Vital Signs are stable, ambulating  without difficulty. Discharge instructions given, patient verbalized understanding. Discharged from facility via wheelchair in stable condition.

## 2024-09-25 ENCOUNTER — TELEPHONE (OUTPATIENT)
Dept: PAIN MEDICINE | Facility: CLINIC | Age: 59
End: 2024-09-25
Payer: COMMERCIAL

## 2024-09-25 NOTE — TELEPHONE ENCOUNTER
Called pt. In reference to message no answer, lvm   ----- Message from Katey Mcbride sent at 9/25/2024  3:38 PM CDT -----  Type:  Patient Returning Call    Who Called:pt  Who Left Message for Patient:office  Does the patient know what this is regarding?:   Would the patient rather a call back or a response via [facility]? call  Best Call Back Number:[phone]  Additional Information: please call back after 430

## 2024-10-28 RX ORDER — CYPROHEPTADINE HYDROCHLORIDE 4 MG/1
4 TABLET ORAL 2 TIMES DAILY PRN
Qty: 60 TABLET | Refills: 5 | Status: SHIPPED | OUTPATIENT
Start: 2024-10-28

## 2024-11-07 ENCOUNTER — TELEPHONE (OUTPATIENT)
Dept: NEUROLOGY | Facility: CLINIC | Age: 59
End: 2024-11-07
Payer: COMMERCIAL

## 2024-11-07 RX ORDER — BUTALBITAL, ACETAMINOPHEN AND CAFFEINE 50; 325; 40 MG/1; MG/1; MG/1
TABLET ORAL
Qty: 30 TABLET | Refills: 0 | Status: SHIPPED | OUTPATIENT
Start: 2024-11-07

## 2024-11-07 RX ORDER — BUTALBITAL, ACETAMINOPHEN AND CAFFEINE 50; 325; 40 MG/1; MG/1; MG/1
TABLET ORAL
Qty: 12 TABLET | Refills: 0 | Status: SHIPPED | OUTPATIENT
Start: 2024-11-07

## 2024-11-07 NOTE — TELEPHONE ENCOUNTER
No care due was identified.  [facility] Embedded Care Due Messages. Reference number: [id number].   11/06/2024 6:00:04 PM CST

## 2025-01-21 RX ORDER — PREGABALIN 50 MG/1
50 CAPSULE ORAL 3 TIMES DAILY
Qty: 90 CAPSULE | Refills: 0 | Status: SHIPPED | OUTPATIENT
Start: 2025-01-21 | End: 2025-02-20

## 2025-01-24 NOTE — TELEPHONE ENCOUNTER
No care due was identified.  [facility] Embedded Care Due Messages. Reference number: [id number].   1/24/2025 5:02:53 PM CST

## 2025-01-26 RX ORDER — TIZANIDINE 4 MG/1
TABLET ORAL
Qty: 30 TABLET | Refills: 0 | Status: SHIPPED | OUTPATIENT
Start: 2025-01-26

## 2025-02-17 ENCOUNTER — PATIENT MESSAGE (OUTPATIENT)
Dept: INTERNAL MEDICINE | Facility: CLINIC | Age: 60
End: 2025-02-17

## 2025-02-17 ENCOUNTER — LAB VISIT (OUTPATIENT)
Dept: LAB | Facility: HOSPITAL | Age: 60
End: 2025-02-17
Attending: INTERNAL MEDICINE
Payer: COMMERCIAL

## 2025-02-17 ENCOUNTER — OFFICE VISIT (OUTPATIENT)
Dept: INTERNAL MEDICINE | Facility: CLINIC | Age: 60
End: 2025-02-17
Payer: COMMERCIAL

## 2025-02-17 VITALS
HEIGHT: 60 IN | BODY MASS INDEX: 36.27 KG/M2 | WEIGHT: 184.75 LBS | HEART RATE: 100 BPM | SYSTOLIC BLOOD PRESSURE: 130 MMHG | DIASTOLIC BLOOD PRESSURE: 82 MMHG | OXYGEN SATURATION: 100 %

## 2025-02-17 DIAGNOSIS — N28.1 RENAL CYST: Primary | ICD-10-CM

## 2025-02-17 DIAGNOSIS — G89.29 CHRONIC LOW BACK PAIN WITH LEFT-SIDED SCIATICA, UNSPECIFIED BACK PAIN LATERALITY: ICD-10-CM

## 2025-02-17 DIAGNOSIS — M54.9 BACK PAIN, UNSPECIFIED BACK LOCATION, UNSPECIFIED BACK PAIN LATERALITY, UNSPECIFIED CHRONICITY: ICD-10-CM

## 2025-02-17 DIAGNOSIS — Z00.00 ANNUAL PHYSICAL EXAM: Primary | ICD-10-CM

## 2025-02-17 DIAGNOSIS — Z12.39 BREAST SCREENING: ICD-10-CM

## 2025-02-17 DIAGNOSIS — Z00.00 ANNUAL PHYSICAL EXAM: ICD-10-CM

## 2025-02-17 DIAGNOSIS — M54.42 CHRONIC LOW BACK PAIN WITH LEFT-SIDED SCIATICA, UNSPECIFIED BACK PAIN LATERALITY: ICD-10-CM

## 2025-02-17 DIAGNOSIS — M70.62 TROCHANTERIC BURSITIS OF LEFT HIP: ICD-10-CM

## 2025-02-17 LAB
ALBUMIN SERPL BCP-MCNC: 4.2 G/DL (ref 3.5–5.2)
ALP SERPL-CCNC: 67 U/L (ref 40–150)
ALT SERPL W/O P-5'-P-CCNC: 28 U/L (ref 10–44)
ANION GAP SERPL CALC-SCNC: 9 MMOL/L (ref 8–16)
AST SERPL-CCNC: 31 U/L (ref 10–40)
BILIRUB SERPL-MCNC: 0.3 MG/DL (ref 0.1–1)
BUN SERPL-MCNC: 14 MG/DL (ref 6–20)
CALCIUM SERPL-MCNC: 9.9 MG/DL (ref 8.7–10.5)
CHLORIDE SERPL-SCNC: 105 MMOL/L (ref 95–110)
CHOLEST SERPL-MCNC: 209 MG/DL (ref 120–199)
CHOLEST/HDLC SERPL: 3 {RATIO} (ref 2–5)
CO2 SERPL-SCNC: 26 MMOL/L (ref 23–29)
CREAT SERPL-MCNC: 0.8 MG/DL (ref 0.5–1.4)
ERYTHROCYTE [DISTWIDTH] IN BLOOD BY AUTOMATED COUNT: 13.8 % (ref 11.5–14.5)
EST. GFR  (NO RACE VARIABLE): >60 ML/MIN/1.73 M^2
GLUCOSE SERPL-MCNC: 81 MG/DL (ref 70–110)
HCT VFR BLD AUTO: 40 % (ref 37–48.5)
HDLC SERPL-MCNC: 70 MG/DL (ref 40–75)
HDLC SERPL: 33.5 % (ref 20–50)
HGB BLD-MCNC: 12.9 G/DL (ref 12–16)
LDLC SERPL CALC-MCNC: 122.8 MG/DL (ref 63–159)
MCH RBC QN AUTO: 28.5 PG (ref 27–31)
MCHC RBC AUTO-ENTMCNC: 32.3 G/DL (ref 32–36)
MCV RBC AUTO: 89 FL (ref 82–98)
NONHDLC SERPL-MCNC: 139 MG/DL
PLATELET # BLD AUTO: 361 K/UL (ref 150–450)
PMV BLD AUTO: 10.5 FL (ref 9.2–12.9)
POTASSIUM SERPL-SCNC: 4.2 MMOL/L (ref 3.5–5.1)
PROT SERPL-MCNC: 7.4 G/DL (ref 6–8.4)
RBC # BLD AUTO: 4.52 M/UL (ref 4–5.4)
SODIUM SERPL-SCNC: 140 MMOL/L (ref 136–145)
TRIGL SERPL-MCNC: 81 MG/DL (ref 30–150)
WBC # BLD AUTO: 5.34 K/UL (ref 3.9–12.7)

## 2025-02-17 PROCEDURE — 80053 COMPREHEN METABOLIC PANEL: CPT | Performed by: INTERNAL MEDICINE

## 2025-02-17 PROCEDURE — 83036 HEMOGLOBIN GLYCOSYLATED A1C: CPT | Performed by: INTERNAL MEDICINE

## 2025-02-17 PROCEDURE — 85027 COMPLETE CBC AUTOMATED: CPT | Performed by: INTERNAL MEDICINE

## 2025-02-17 PROCEDURE — 80061 LIPID PANEL: CPT | Performed by: INTERNAL MEDICINE

## 2025-02-17 RX ORDER — DICLOFENAC SODIUM 30 MG/G
GEL TOPICAL
Qty: 100 G | Refills: 5 | Status: SHIPPED | OUTPATIENT
Start: 2025-02-17

## 2025-02-17 RX ORDER — DULOXETIN HYDROCHLORIDE 30 MG/1
30 CAPSULE, DELAYED RELEASE ORAL DAILY
Qty: 90 CAPSULE | Refills: 3 | Status: SHIPPED | OUTPATIENT
Start: 2025-02-17 | End: 2026-02-17

## 2025-02-17 RX ORDER — HYDROCODONE BITARTRATE AND ACETAMINOPHEN 5; 325 MG/1; MG/1
TABLET ORAL
Qty: 40 TABLET | Refills: 0 | Status: SHIPPED | OUTPATIENT
Start: 2025-02-17

## 2025-02-17 NOTE — PROGRESS NOTES
Subjective     Patient ID: Jeannie Saavedra is a 59 y.o. female.    Chief Complaint: Annual Exam    HPI  She c/o worsening pain in L buttocks and lateral thigh, that occurred after L SI joint injection.  Worse with turning, walking.  Feels like thigh is being stabbed.     She c/o daily lower back pain.     She stopped Lyrica as caused headache.      MRI lumbar spine - mile degenerative changes      Tylenol helps a little.  Takes ibuprofen also - 800 mg 2-3 times a day.       She can't get lidoderm patches to adhere.      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    She takes tizanidine for foot spasm.    Migraines are occurring less often.     Neuro note reviewed 7/2/24.    She has weaned off estrogen.    Knee pain improved.  Review of Systems   Constitutional:  Negative for fever and unexpected weight change.   HENT:  Negative for nasal congestion and postnasal drip.    Eyes:  Negative for pain, discharge and visual disturbance.   Respiratory:  Negative for cough, chest tightness, shortness of breath and wheezing.    Cardiovascular:  Negative for chest pain and leg swelling.   Gastrointestinal:  Negative for abdominal pain, constipation, diarrhea and nausea.   Genitourinary:  Negative for difficulty urinating, dysuria and hematuria.   Musculoskeletal:  Positive for back pain.   Integumentary:  Negative for rash.   Neurological:  Negative for headaches.   Psychiatric/Behavioral:  Positive for dysphoric mood. Negative for sleep disturbance. The patient is nervous/anxious.           Objective     Physical Exam  Constitutional:       General: She is not in acute distress.     Appearance: She is well-developed. She is not ill-appearing, toxic-appearing or diaphoretic.   Eyes:      General: No scleral icterus.  Neck:      Thyroid: No thyromegaly.      Vascular: No JVD.   Cardiovascular:      Rate and Rhythm: Normal rate and regular rhythm.      Heart sounds: Normal heart sounds.   Pulmonary:      Effort: Pulmonary effort  is normal. No respiratory distress.      Breath sounds: Normal breath sounds. No stridor. No wheezing, rhonchi or rales.   Abdominal:      General: There is no distension.      Palpations: Abdomen is soft. There is no mass.      Tenderness: There is no abdominal tenderness. There is no guarding or rebound.      Hernia: No hernia is present.   Musculoskeletal:      Left hip: Tenderness (L humeral head) present.      Right lower leg: No edema.      Left lower leg: No edema.   Neurological:      Mental Status: She is alert and oriented to person, place, and time.      Cranial Nerves: No cranial nerve deficit.   Psychiatric:         Mood and Affect: Mood normal.         Behavior: Behavior normal.         Thought Content: Thought content normal.         Judgment: Judgment normal.            Assessment and Plan     1. Annual physical exam  -     Comprehensive Metabolic Panel; Future; Expected date: 02/17/2025  -     CBC Without Differential; Future; Expected date: 02/17/2025  -     Hemoglobin A1C; Future; Expected date: 02/17/2025  -     Lipid Panel; Future; Expected date: 02/17/2025    2. Breast screening  -     Mammo Digital Screening Bilat w/ New (XPD); Future; Expected date: 06/22/2025    3. Back pain, unspecified back location, unspecified back pain laterality, unspecified chronicity  -     HYDROcodone-acetaminophen (NORCO) 5-325 mg per tablet; 1-2 tabs po qhs for back pain  Dispense: 40 tablet; Refill: 0    4. Chronic low back pain with left-sided sciatica, unspecified back pain laterality  -     diclofenac sodium (SOLARAZE) 3 % gel; Apple bid to hip  Dispense: 100 g; Refill: 5  -     DULoxetine (CYMBALTA) 30 MG capsule; Take 1 capsule (30 mg total) by mouth once daily.  Dispense: 90 capsule; Refill: 3  -     Cancel: Ambulatory Referral/Consult to Physical/Occupational Therapy; Future; Expected date: 02/24/2025  -     Ambulatory referral/consult to Sports Medicine; Future; Expected date: 02/24/2025  -      Ambulatory Referral/Consult to Physical/Occupational Therapy; Future; Expected date: 02/24/2025    5. Trochanteric bursitis of left hip  -     Ambulatory referral/consult to Sports Medicine; Future; Expected date: 02/24/2025  -     Ambulatory Referral/Consult to Physical/Occupational Therapy; Future; Expected date: 02/24/2025        She declines bursa injection  Start cymbalta, diclofenac gel    Review few weeks    She declines f/u with [facility]  She is skeptical that PT will be covered          No follow-ups on file.

## 2025-02-18 ENCOUNTER — RESULTS FOLLOW-UP (OUTPATIENT)
Dept: INTERNAL MEDICINE | Facility: CLINIC | Age: 60
End: 2025-02-18

## 2025-02-18 LAB
ESTIMATED AVG GLUCOSE: 111 MG/DL (ref 68–131)
HBA1C MFR BLD: 5.5 % (ref 4–5.6)

## 2025-02-18 NOTE — TELEPHONE ENCOUNTER
4.1 x 3.2 cyst L kidney    2.7 x 3.2 x 2.7 cm, consistent with simple renal cyst 8/22.    Pls schedule U.S

## 2025-02-18 NOTE — TELEPHONE ENCOUNTER
LOV with Jannette Salguero MD , 2/17/2025    Patient inquiring about cyst in kidney from MRI dated 9/7/24.    Patient also inquiring about labs dated 2/18/25 informed patient the results would be reviewed by provider.

## 2025-02-20 ENCOUNTER — TELEPHONE (OUTPATIENT)
Dept: SPORTS MEDICINE | Facility: CLINIC | Age: 60
End: 2025-02-20
Payer: COMMERCIAL

## 2025-02-20 NOTE — TELEPHONE ENCOUNTER
Attempted to contact pt to get more information and schedule possible appt with Dr. Aponte, left call back number.

## 2025-02-21 ENCOUNTER — HOSPITAL ENCOUNTER (OUTPATIENT)
Dept: RADIOLOGY | Facility: HOSPITAL | Age: 60
Discharge: HOME | End: 2025-02-21
Attending: INTERNAL MEDICINE
Payer: COMMERCIAL

## 2025-02-21 DIAGNOSIS — N28.1 RENAL CYST: ICD-10-CM

## 2025-02-21 PROCEDURE — 76770 US EXAM ABDO BACK WALL COMP: CPT | Mod: TC

## 2025-02-21 PROCEDURE — 76770 US EXAM ABDO BACK WALL COMP: CPT | Mod: 26,,, | Performed by: RADIOLOGY

## 2025-02-22 ENCOUNTER — RESULTS FOLLOW-UP (OUTPATIENT)
Dept: INTERNAL MEDICINE | Facility: CLINIC | Age: 60
End: 2025-02-22

## 2025-02-22 DIAGNOSIS — N28.1 RENAL CYST: Primary | ICD-10-CM

## 2025-02-25 NOTE — TELEPHONE ENCOUNTER
----- Message from Jannette Salguero MD sent at 2/22/2025  5:55 PM CST -----  Urology appt  ----- Message -----  From: Evan, Rad Results In  Sent: 2/22/2025   3:33 AM CST  To: Jannette Salguero MD

## 2025-02-27 DIAGNOSIS — L70.0 ACNE VULGARIS: ICD-10-CM

## 2025-02-28 RX ORDER — TRETINOIN 0.25 MG/G
CREAM TOPICAL
Qty: 20 G | Refills: 5 | Status: SHIPPED | OUTPATIENT
Start: 2025-02-28

## 2025-03-11 ENCOUNTER — TELEPHONE (OUTPATIENT)
Dept: UROLOGY | Facility: CLINIC | Age: 60
End: 2025-03-11

## 2025-03-11 ENCOUNTER — OFFICE VISIT (OUTPATIENT)
Dept: UROLOGY | Facility: CLINIC | Age: 60
End: 2025-03-11
Payer: COMMERCIAL

## 2025-03-11 VITALS
WEIGHT: 185.19 LBS | HEART RATE: 73 BPM | BODY MASS INDEX: 36.36 KG/M2 | RESPIRATION RATE: 18 BRPM | SYSTOLIC BLOOD PRESSURE: 138 MMHG | DIASTOLIC BLOOD PRESSURE: 90 MMHG | HEIGHT: 60 IN | TEMPERATURE: 98 F

## 2025-03-11 DIAGNOSIS — R35.0 URINARY FREQUENCY: ICD-10-CM

## 2025-03-11 DIAGNOSIS — N39.41 URGE URINARY INCONTINENCE: ICD-10-CM

## 2025-03-11 DIAGNOSIS — R35.1 NOCTURIA: ICD-10-CM

## 2025-03-11 DIAGNOSIS — N20.0 RENAL CALCULUS, RIGHT: ICD-10-CM

## 2025-03-11 DIAGNOSIS — N28.1 RENAL CYST, LEFT: Primary | ICD-10-CM

## 2025-03-11 DIAGNOSIS — R39.15 URINARY URGENCY: ICD-10-CM

## 2025-03-11 LAB
BACTERIA #/AREA URNS AUTO: NORMAL /HPF
BILIRUB UR QL STRIP: NEGATIVE
CAOX CRY UR QL COMP ASSIST: NORMAL
CLARITY UR REFRACT.AUTO: CLEAR
COLOR UR AUTO: YELLOW
GLUCOSE UR QL STRIP: NEGATIVE
HGB UR QL STRIP: NEGATIVE
HYALINE CASTS UR QL AUTO: 1 /LPF
KETONES UR QL STRIP: NEGATIVE
LEUKOCYTE ESTERASE UR QL STRIP: NEGATIVE
MICROSCOPIC COMMENT: NORMAL
NITRITE UR QL STRIP: NEGATIVE
PH UR STRIP: 6 [PH] (ref 5–8)
PROT UR QL STRIP: ABNORMAL
RBC #/AREA URNS AUTO: 2 /HPF (ref 0–4)
SP GR UR STRIP: >1.03 (ref 1–1.03)
SQUAMOUS #/AREA URNS AUTO: 0 /HPF
URN SPEC COLLECT METH UR: ABNORMAL
WBC #/AREA URNS AUTO: 1 /HPF (ref 0–5)

## 2025-03-11 PROCEDURE — 81001 URINALYSIS AUTO W/SCOPE: CPT | Performed by: NURSE PRACTITIONER

## 2025-03-11 PROCEDURE — 87086 URINE CULTURE/COLONY COUNT: CPT | Performed by: NURSE PRACTITIONER

## 2025-03-11 PROCEDURE — 99999 PR PBB SHADOW E&M-EST. PATIENT-LVL V: CPT | Mod: PBBFAC,,, | Performed by: NURSE PRACTITIONER

## 2025-03-11 NOTE — TELEPHONE ENCOUNTER
Patient is scheduled in 6 Month for a Ultrasound, and Follow Up, and was informed she will get a reminder in the portal, and if she needs to reschedule to give the office a call.

## 2025-03-11 NOTE — PATIENT INSTRUCTIONS
U/A and urine cx today  U/S retroperitoneal complete in 6 months.   Follow-up in 6 months or sooner if needed for monitoring of right renal stone and left renal cyst.

## 2025-03-11 NOTE — TELEPHONE ENCOUNTER
----- Message from Kellie sent at 3/11/2025 10:51 AM CDT -----  Type:  Patient Returning CallWho Called:PtWho Left Message for Patient:ImaniDoes the patient know what this is regarding?:schedulingWould the patient rather a call back or a response via [facility]? [facility] Call Back Number:[phone] Information:

## 2025-03-11 NOTE — PROGRESS NOTES
Subjective:       Patient ID: Jeannie Saavedra is a 59 y.o. female.    Chief Complaint: Cyst (Cyst in kidney. )    History of Present Illness    CHIEF COMPLAINT:  Patient is new to me. She is a 60 yo AAF who presents today for follow up of left renal cyst and right renal calculus.    RENAL HISTORY:  She has history of renal cyst initially discovered by Dr. Benton in 2010, which has grown from 1.2cm to 4cm over a 15-year period. She has a history of kidney stones and reports passing them on the past. She currently has a 7 mm right non-obstructing renal stone that was noted on recent U/S dated 2/21/2025.     URINARY SYMPTOMS:  She reports nocturia occurring 3 times per night and urinary urgency with incontinence sometimes. She denies stress incontinence associated with laughing, coughing, or sneezing. She has a past history of hematuria in 2010 but denies recent episodes.    CURRENT SYMPTOMS:  She reports intermittent left-sided pain. She denies fever, chills, nausea, and vomiting.      ROS:  General: -fever, -chills, -fatigue, -weight gain, -weight loss  Eyes: -vision changes, -redness, -discharge  ENT: -ear pain, -nasal congestion, -sore throat  Cardiovascular: -chest pain, -palpitations, -lower extremity edema  Respiratory: -cough, -shortness of breath  Gastrointestinal: -abdominal pain, -nausea, -vomiting, -diarrhea, -constipation, -blood in stool  Genitourinary: -dysuria, -hematuria, +frequency, +urgency  Musculoskeletal: +intermittent left lower back pain, -muscle pain  Skin: -rash, -lesion  Neurological: -headache, -dizziness, -numbness, -tingling  Psychiatric: -anxiety, -depression, -sleep difficulty           Objective:      Physical Exam  Vitals and nursing note reviewed.   Constitutional:       General: She is not in acute distress.     Appearance: She is well-developed. She is obese. She is not ill-appearing.   HENT:      Head: Normocephalic and atraumatic.   Eyes:      Pupils: Pupils are equal, round,  and reactive to light.   Cardiovascular:      Rate and Rhythm: Normal rate.   Pulmonary:      Effort: Pulmonary effort is normal. No respiratory distress.   Abdominal:      Palpations: Abdomen is soft.      Tenderness: There is no abdominal tenderness.   Musculoskeletal:         General: Normal range of motion.      Cervical back: Normal range of motion.   Skin:     General: Skin is warm and dry.   Neurological:      Mental Status: She is alert and oriented to person, place, and time.      Coordination: Coordination normal.   Psychiatric:         Mood and Affect: Mood normal.         Behavior: Behavior normal.         Thought Content: Thought content normal.         Judgment: Judgment normal.         Assessment:       Problem List Items Addressed This Visit    None  Visit Diagnoses         Renal cyst, left    -  Primary      Renal calculus, right          Urinary frequency        Relevant Orders    Urinalysis    Urine Culture High Risk      Urinary urgency        Relevant Orders    Urinalysis    Urine Culture High Risk      Urge urinary incontinence        Relevant Orders    Urinalysis    Urine Culture High Risk      Nocturia        Relevant Orders    Urinalysis    Urine Culture High Risk            Plan:           Jeannie was seen today for cyst.    Diagnoses and all orders for this visit:    Renal cyst, left  -     US Retroperitoneal Complete; Future in 6 months    Renal calculus, right  -     US Retroperitoneal Complete; Future in 6 months    Urinary frequency  -     Urinalysis  -     Urine Culture High Risk    Urinary urgency  -     Urinalysis  -     Urine Culture High Risk    Urge urinary incontinence  -     Urinalysis  -     Urine Culture High Risk    Nocturia  -     Urinalysis  -     Urine Culture High Risk    NOTE: Discussed starting patient on tamsulosin for right renal stone, but she decided that she will work on increasing her water intake.     Follow-up in 6 months or sooner if needed for monitoring of  right renal stone and left renal cyst.      This note was generated with the assistance of ambient listening technology. Verbal consent was obtained by the patient and accompanying visitor(s) for the recording of patient appointment to facilitate this note. I attest to having reviewed and edited the generated note for accuracy, though some syntax or spelling errors may persist. Please contact the author of this note for any clarification.      Jefferson Muhammad, DNP

## 2025-03-11 NOTE — TELEPHONE ENCOUNTER
Left a voicemail to schedule patient for a 6 Month US, and Follow Up. Informed patient to give us a call when she can.

## 2025-03-13 ENCOUNTER — RESULTS FOLLOW-UP (OUTPATIENT)
Dept: UROLOGY | Facility: CLINIC | Age: 60
End: 2025-03-13

## 2025-03-13 ENCOUNTER — TELEPHONE (OUTPATIENT)
Dept: UROLOGY | Facility: CLINIC | Age: 60
End: 2025-03-13
Payer: COMMERCIAL

## 2025-03-13 DIAGNOSIS — R35.0 URINE FREQUENCY: Primary | ICD-10-CM

## 2025-03-13 LAB
BACTERIA UR CULT: NORMAL
BACTERIA UR CULT: NORMAL

## 2025-03-13 NOTE — TELEPHONE ENCOUNTER
----- Message from Jefferson Muhammad DNP sent at 3/13/2025  1:18 PM CDT -----  Please inform patient via telephone that her urine cx showed some bacteria but none in predominance to diagnose a UTI. Urine sample was contaminated. Repeat urine cx only if urinary symptoms are   present and re-educate patient on clean catch mid-stream urine specimen collection. Thanks.  ----- Message -----  From: Hiren, [facility]  Sent: 3/11/2025   6:23 PM CDT  To: Jefferson Muhammad DNP

## 2025-03-13 NOTE — TELEPHONE ENCOUNTER
Called pt to let her know results and pt stated that she was still having urine frequency so I order repeat urine cx.  Let pt know that she could go to the lab and get it done at her convenience. Pt verbally understood.

## 2025-03-17 ENCOUNTER — TELEPHONE (OUTPATIENT)
Dept: UROLOGY | Facility: CLINIC | Age: 60
End: 2025-03-17
Payer: COMMERCIAL

## 2025-03-17 ENCOUNTER — RESULTS FOLLOW-UP (OUTPATIENT)
Dept: UROLOGY | Facility: CLINIC | Age: 60
End: 2025-03-17

## 2025-03-17 DIAGNOSIS — N30.00 ACUTE CYSTITIS WITHOUT HEMATURIA: Primary | ICD-10-CM

## 2025-03-17 RX ORDER — SULFAMETHOXAZOLE AND TRIMETHOPRIM 800; 160 MG/1; MG/1
1 TABLET ORAL 2 TIMES DAILY
Qty: 20 TABLET | Refills: 0 | Status: SHIPPED | OUTPATIENT
Start: 2025-03-17 | End: 2025-03-27

## 2025-03-17 NOTE — TELEPHONE ENCOUNTER
Called patient to notify that urine cx was positiver for UTI and that Bactrim was sent to pharmacy. Let pt know if symptoms do not resolve with treatment in 2 wks provider would like a virtual f/u. Pt verbally understood.

## 2025-03-17 NOTE — TELEPHONE ENCOUNTER
----- Message from Jefferson Muhammad DNP sent at 3/17/2025  4:29 PM CDT -----  Please inform patient via telephone that her urine cx came back positive for a UTI. Script for Bactrim DS sent to [facility]. Follow-up in 2 weeks (4/4/2025) via virtual video if urinary   symptoms fail to resolve.     ----- Message -----  From: Hiren [facility] Interface  Sent: 3/15/2025   1:46 AM CDT  To: Jefferson Muhammad DNP

## 2025-04-13 DIAGNOSIS — M54.9 BACK PAIN, UNSPECIFIED BACK LOCATION, UNSPECIFIED BACK PAIN LATERALITY, UNSPECIFIED CHRONICITY: ICD-10-CM

## 2025-04-13 NOTE — TELEPHONE ENCOUNTER
No care due was identified.  [facility] Embedded Care Due Messages. Reference number: [id number].   4/13/2025 6:00:23 PM CDT

## 2025-04-14 RX ORDER — HYDROCODONE BITARTRATE AND ACETAMINOPHEN 5; 325 MG/1; MG/1
TABLET ORAL
Qty: 40 TABLET | Refills: 0 | Status: SHIPPED | OUTPATIENT
Start: 2025-04-14

## 2025-04-17 ENCOUNTER — PATIENT MESSAGE (OUTPATIENT)
Dept: INTERNAL MEDICINE | Facility: CLINIC | Age: 60
End: 2025-04-17
Payer: COMMERCIAL

## 2025-04-21 ENCOUNTER — TELEPHONE (OUTPATIENT)
Dept: INTERNAL MEDICINE | Facility: CLINIC | Age: 60
End: 2025-04-21
Payer: COMMERCIAL

## 2025-04-23 ENCOUNTER — TELEPHONE (OUTPATIENT)
Dept: INTERNAL MEDICINE | Facility: CLINIC | Age: 60
End: 2025-04-23
Payer: COMMERCIAL

## 2025-04-23 ENCOUNTER — PATIENT MESSAGE (OUTPATIENT)
Dept: INTERNAL MEDICINE | Facility: CLINIC | Age: 60
End: 2025-04-23
Payer: COMMERCIAL

## 2025-04-23 NOTE — TELEPHONE ENCOUNTER
----- Message from Med Assistant Schmidt sent at 4/23/2025  2:57 PM CDT -----  Regarding: FW: This message is for the provider only  Contact: [phone]    ----- Message -----  From: Va Gusman  Sent: 4/23/2025  12:48 PM CDT  To: Noemy HANLEY Staff  Subject: This message is for the provider only            Would like to receive medical advice.Would they like a call back or a response via [employer]:  call backAdditional information:  Jeannie is calling to speak to the provider only. Jeannie states she needs a PA for an medication but when she speaks to the nurses on the portal they seem to turn off the reply button. Jeannie states Dr. Salguero has all the information for the PA. Please call the pt back for advice

## 2025-04-23 NOTE — TELEPHONE ENCOUNTER
----- Message from Med Assistant Schmidt sent at 4/23/2025  2:57 PM CDT -----  Regarding: FW: This message is for the provider only  Contact: [phone]    ----- Message -----  From: Va Gusman  Sent: 4/23/2025  12:48 PM CDT  To: Noemy HANLEY Staff  Subject: This message is for the provider only            Would like to receive medical advice.Would they like a call back or a response via [employer]:  call backAdditional information:  Jeannie is calling to speak to the provider only. Jeannie states she needs a PA for an medication but when she speaks to the nurses on the portal they seem to turn off the reply button. Jeannie states Dr. Salguero has all the information for the PA. Please call the pt back for advice

## 2025-04-23 NOTE — TELEPHONE ENCOUNTER
Patient picked up medication. Too soon to do PA. Informed her to call when she is about to run out of meds.

## 2025-04-23 NOTE — TELEPHONE ENCOUNTER
Attempted to call pt back about PA for medication but no response at this time. Left VM to call office when available.

## 2025-04-26 ENCOUNTER — PATIENT MESSAGE (OUTPATIENT)
Dept: INTERNAL MEDICINE | Facility: CLINIC | Age: 60
End: 2025-04-26
Payer: COMMERCIAL

## 2025-04-28 ENCOUNTER — PATIENT MESSAGE (OUTPATIENT)
Dept: INTERNAL MEDICINE | Facility: CLINIC | Age: 60
End: 2025-04-28
Payer: COMMERCIAL

## 2025-04-28 DIAGNOSIS — L30.4 INTERTRIGO: Primary | ICD-10-CM

## 2025-04-29 ENCOUNTER — PATIENT MESSAGE (OUTPATIENT)
Dept: INTERNAL MEDICINE | Facility: CLINIC | Age: 60
End: 2025-04-29
Payer: COMMERCIAL

## 2025-04-29 RX ORDER — KETOCONAZOLE 20 MG/G
CREAM TOPICAL DAILY
Qty: 60 G | Refills: 0 | Status: SHIPPED | OUTPATIENT
Start: 2025-04-29

## 2025-04-30 ENCOUNTER — PATIENT MESSAGE (OUTPATIENT)
Dept: INTERNAL MEDICINE | Facility: CLINIC | Age: 60
End: 2025-04-30
Payer: COMMERCIAL

## 2025-04-30 NOTE — TELEPHONE ENCOUNTER
LOV with Jannette Salguero MD , 2/17/2025    In 4/23 and 4/28 pt message, pt mentioned about not being able to respond

## 2025-05-01 ENCOUNTER — TELEPHONE (OUTPATIENT)
Dept: INTERNAL MEDICINE | Facility: CLINIC | Age: 60
End: 2025-05-01
Payer: COMMERCIAL

## 2025-05-01 NOTE — TELEPHONE ENCOUNTER
Pa for Norco was initiated via [employer].  [employer] Prior Authorizations  Reply deadline: May 8, 2025 12:21 PM   Case ID: [id number]   Payer: [employer]   Phone: [phone]   Fax: [phone]

## 2025-05-13 DIAGNOSIS — M54.9 BACK PAIN, UNSPECIFIED BACK LOCATION, UNSPECIFIED BACK PAIN LATERALITY, UNSPECIFIED CHRONICITY: ICD-10-CM

## 2025-05-13 NOTE — TELEPHONE ENCOUNTER
No care due was identified.  [facility] Embedded Care Due Messages. Reference number: [id number].   5/13/2025 5:45:19 PM CDT

## 2025-05-14 RX ORDER — HYDROCODONE BITARTRATE AND ACETAMINOPHEN 5; 325 MG/1; MG/1
TABLET ORAL
Qty: 40 TABLET | Refills: 0 | Status: SHIPPED | OUTPATIENT
Start: 2025-05-14 | End: 2025-05-15 | Stop reason: SDUPTHER

## 2025-05-14 NOTE — TELEPHONE ENCOUNTER
Refill Routing Note   Medication(s) are not appropriate for processing by [facility] for the following reason(s):        Outside of protocol    [facility] action(s):  Route             Appointments  past 12m or future 3m with PCP    Date Provider   Last Visit   2/17/2025 Jannette Salguero MD   Next Visit   5/28/2025 Jannette Salguero MD   ED visits in past 90 days: 0        Note composed:8:12 AM 05/14/2025

## 2025-05-15 DIAGNOSIS — M54.9 BACK PAIN, UNSPECIFIED BACK LOCATION, UNSPECIFIED BACK PAIN LATERALITY, UNSPECIFIED CHRONICITY: ICD-10-CM

## 2025-05-15 RX ORDER — HYDROCODONE BITARTRATE AND ACETAMINOPHEN 5; 325 MG/1; MG/1
TABLET ORAL
Qty: 40 TABLET | Refills: 0 | Status: SHIPPED | OUTPATIENT
Start: 2025-05-15

## 2025-05-15 RX ORDER — NALOXONE HYDROCHLORIDE 4 MG/.1ML
SPRAY NASAL
Qty: 1 EACH | Refills: 11 | Status: SHIPPED | OUTPATIENT
Start: 2025-05-15

## 2025-05-15 NOTE — TELEPHONE ENCOUNTER
No care due was identified.  [facility] Embedded Care Due Messages. Reference number: [id number].   5/15/2025 4:47:29 PM CDT

## 2025-05-16 ENCOUNTER — TELEPHONE (OUTPATIENT)
Dept: INTERNAL MEDICINE | Facility: CLINIC | Age: 60
End: 2025-05-16

## 2025-05-16 RX ORDER — HYDROCODONE BITARTRATE AND ACETAMINOPHEN 5; 325 MG/1; MG/1
TABLET ORAL
Qty: 40 TABLET | Refills: 0 | Status: SHIPPED | OUTPATIENT
Start: 2025-05-16

## 2025-05-16 NOTE — TELEPHONE ENCOUNTER
Refill Routing Note   Medication(s) are not appropriate for processing by [facility] for the following reason(s):        Outside of protocol    [facility] action(s):  Route               Appointments  past 12m or future 3m with PCP    Date Provider   Last Visit   2/17/2025 Jannette Salguero MD   Next Visit   5/15/2025 Jannette Salguero MD   ED visits in past 90 days: 0        Note composed:10:19 AM 05/16/2025

## 2025-05-26 DIAGNOSIS — A60.09 HERPES GENITALIS IN WOMEN: ICD-10-CM

## 2025-05-26 RX ORDER — VALACYCLOVIR HYDROCHLORIDE 500 MG/1
500 TABLET, FILM COATED ORAL 2 TIMES DAILY
Qty: 28 TABLET | Refills: 0 | OUTPATIENT
Start: 2025-05-26

## 2025-05-26 RX ORDER — VALACYCLOVIR HYDROCHLORIDE 500 MG/1
500 TABLET, FILM COATED ORAL 2 TIMES DAILY
Qty: 28 TABLET | Refills: 3 | Status: SHIPPED | OUTPATIENT
Start: 2025-05-26 | End: 2025-07-21

## 2025-05-26 NOTE — TELEPHONE ENCOUNTER
Refill Decision Note   Jeanniekim Wangcindy  is requesting a refill authorization.  Brief Assessment and Rationale for Refill:  Approve     Medication Therapy Plan:        Comments:     Note composed:12:38 PM 05/26/2025

## 2025-05-26 NOTE — TELEPHONE ENCOUNTER
No care due was identified.  [facility] Embedded Care Due Messages. Reference number: [id number].   5/26/2025 11:49:48 AM CDT

## 2025-05-26 NOTE — TELEPHONE ENCOUNTER
No care due was identified.  [facility] Embedded Care Due Messages. Reference number: [id number].   5/26/2025 11:47:48 AM CDT

## 2025-05-26 NOTE — TELEPHONE ENCOUNTER
Refill Decision Note   Jeannie Saavedra  is requesting a refill authorization.  Brief Assessment and Rationale for Refill:  Quick Discontinue     Medication Therapy Plan: DUPLICATE      Comments:     Note composed:12:07 PM 05/26/2025

## 2025-06-03 RX ORDER — BUTALBITAL, ACETAMINOPHEN AND CAFFEINE 50; 325; 40 MG/1; MG/1; MG/1
TABLET ORAL
Qty: 30 TABLET | Refills: 0 | Status: SHIPPED | OUTPATIENT
Start: 2025-06-03 | End: 2025-06-06 | Stop reason: SDUPTHER

## 2025-06-06 RX ORDER — BUTALBITAL, ACETAMINOPHEN AND CAFFEINE 50; 325; 40 MG/1; MG/1; MG/1
TABLET ORAL
Qty: 30 TABLET | Refills: 0 | Status: SHIPPED | OUTPATIENT
Start: 2025-06-06

## 2025-06-27 ENCOUNTER — OCCUPATIONAL HEALTH (OUTPATIENT)
Dept: URGENT CARE | Facility: CLINIC | Age: 60
End: 2025-06-27

## 2025-06-27 VITALS — BODY MASS INDEX: 35.93 KG/M2 | WEIGHT: 183 LBS | HEIGHT: 60 IN

## 2025-06-27 DIAGNOSIS — Z02.89 ENCOUNTER FOR EXAMINATION REQUIRED BY DEPARTMENT OF TRANSPORTATION (DOT): Primary | ICD-10-CM

## 2025-07-17 DIAGNOSIS — M54.9 BACK PAIN, UNSPECIFIED BACK LOCATION, UNSPECIFIED BACK PAIN LATERALITY, UNSPECIFIED CHRONICITY: ICD-10-CM

## 2025-07-17 RX ORDER — HYDROCODONE BITARTRATE AND ACETAMINOPHEN 5; 325 MG/1; MG/1
TABLET ORAL
Qty: 40 TABLET | Refills: 0 | Status: SHIPPED | OUTPATIENT
Start: 2025-07-17

## 2025-07-17 NOTE — TELEPHONE ENCOUNTER
No care due was identified.  [facility] Embedded Care Due Messages. Reference number: [id number].   7/17/2025 11:54:12 AM CDT

## 2025-07-25 DIAGNOSIS — M54.42 CHRONIC LOW BACK PAIN WITH LEFT-SIDED SCIATICA, UNSPECIFIED BACK PAIN LATERALITY: ICD-10-CM

## 2025-07-25 DIAGNOSIS — G89.29 CHRONIC LOW BACK PAIN WITH LEFT-SIDED SCIATICA, UNSPECIFIED BACK PAIN LATERALITY: ICD-10-CM

## 2025-07-25 NOTE — TELEPHONE ENCOUNTER
No care due was identified.  [facility] Embedded Care Due Messages. Reference number: [id number].   7/25/2025 10:21:09 AM CDT

## 2025-07-26 NOTE — TELEPHONE ENCOUNTER
Refill Routing Note   Medication(s) are not appropriate for processing by [facility] for the following reason(s):        Outside of protocol    [facility] action(s):  Route             Appointments  past 12m or future 3m with PCP    Date Provider   Last Visit   2/17/2025 Jannette Salguero MD   Next Visit   8/4/2025 Jannette Salguero MD   ED visits in past 90 days: 0        Note composed:7:48 PM 07/25/2025

## 2025-07-27 RX ORDER — DICLOFENAC SODIUM 30 MG/G
GEL TOPICAL
Qty: 100 G | Refills: 5 | Status: SHIPPED | OUTPATIENT
Start: 2025-07-27

## 2025-08-04 ENCOUNTER — HOSPITAL ENCOUNTER (OUTPATIENT)
Dept: RADIOLOGY | Facility: HOSPITAL | Age: 60
Discharge: HOME | End: 2025-08-04
Attending: INTERNAL MEDICINE
Payer: COMMERCIAL

## 2025-08-04 ENCOUNTER — OFFICE VISIT (OUTPATIENT)
Dept: INTERNAL MEDICINE | Facility: CLINIC | Age: 60
End: 2025-08-04
Payer: COMMERCIAL

## 2025-08-04 VITALS
OXYGEN SATURATION: 95 % | HEART RATE: 90 BPM | SYSTOLIC BLOOD PRESSURE: 132 MMHG | DIASTOLIC BLOOD PRESSURE: 90 MMHG | BODY MASS INDEX: 36.53 KG/M2 | HEIGHT: 60 IN | WEIGHT: 186.06 LBS

## 2025-08-04 DIAGNOSIS — Z13.1 SCREENING FOR DIABETES MELLITUS: ICD-10-CM

## 2025-08-04 DIAGNOSIS — M77.11 LATERAL EPICONDYLITIS OF RIGHT ELBOW: ICD-10-CM

## 2025-08-04 DIAGNOSIS — G47.9 SLEEP DISTURBANCE: ICD-10-CM

## 2025-08-04 DIAGNOSIS — M25.529 ELBOW PAIN, UNSPECIFIED LATERALITY: Primary | ICD-10-CM

## 2025-08-04 DIAGNOSIS — M54.42 CHRONIC LEFT-SIDED LOW BACK PAIN WITH LEFT-SIDED SCIATICA: ICD-10-CM

## 2025-08-04 DIAGNOSIS — Z13.6 SCREENING FOR CARDIOVASCULAR CONDITION: ICD-10-CM

## 2025-08-04 DIAGNOSIS — R03.0 ELEVATED BLOOD PRESSURE READING WITHOUT DIAGNOSIS OF HYPERTENSION: ICD-10-CM

## 2025-08-04 DIAGNOSIS — M25.529 ELBOW PAIN, UNSPECIFIED LATERALITY: ICD-10-CM

## 2025-08-04 DIAGNOSIS — G89.29 CHRONIC LEFT-SIDED LOW BACK PAIN WITH LEFT-SIDED SCIATICA: ICD-10-CM

## 2025-08-04 PROCEDURE — 99214 OFFICE O/P EST MOD 30 MIN: CPT | Mod: S$GLB,,, | Performed by: INTERNAL MEDICINE

## 2025-08-04 PROCEDURE — 73070 X-RAY EXAM OF ELBOW: CPT | Mod: 26,RT,, | Performed by: RADIOLOGY

## 2025-08-04 PROCEDURE — 3008F BODY MASS INDEX DOCD: CPT | Mod: CPTII,S$GLB,, | Performed by: INTERNAL MEDICINE

## 2025-08-04 PROCEDURE — 3080F DIAST BP >= 90 MM HG: CPT | Mod: CPTII,S$GLB,, | Performed by: INTERNAL MEDICINE

## 2025-08-04 PROCEDURE — G2211 COMPLEX E/M VISIT ADD ON: HCPCS | Mod: S$GLB,,, | Performed by: INTERNAL MEDICINE

## 2025-08-04 PROCEDURE — 1160F RVW MEDS BY RX/DR IN RCRD: CPT | Mod: CPTII,S$GLB,, | Performed by: INTERNAL MEDICINE

## 2025-08-04 PROCEDURE — 3075F SYST BP GE 130 - 139MM HG: CPT | Mod: CPTII,S$GLB,, | Performed by: INTERNAL MEDICINE

## 2025-08-04 PROCEDURE — 99999 PR PBB SHADOW E&M-EST. PATIENT-LVL V: CPT | Mod: PBBFAC,,, | Performed by: INTERNAL MEDICINE

## 2025-08-04 PROCEDURE — 1159F MED LIST DOCD IN RCRD: CPT | Mod: CPTII,S$GLB,, | Performed by: INTERNAL MEDICINE

## 2025-08-04 PROCEDURE — 3044F HG A1C LEVEL LT 7.0%: CPT | Mod: CPTII,S$GLB,, | Performed by: INTERNAL MEDICINE

## 2025-08-04 PROCEDURE — 73070 X-RAY EXAM OF ELBOW: CPT | Mod: TC,RT

## 2025-08-04 NOTE — PROGRESS NOTES
Subjective     Patient ID: Jeannie Saavedra is a 59 y.o. female.    Chief Complaint: Back Pain and Hip Pain    HPI  Has insurance form to be completed.   Nees utd labs.  Last ate at 9 am    C/o r elbow pain x 2 mo.  Gets stiff and painful.  Very painful in am.pain with lifting or extending after a prolonged period of rest.    She c/o poor sleep.    Pain in back, knee, hip, elbow at night, which also interferes with sleep.    She tries to walk 5 blocks.      Not depressed.    Difficult to fall and stay asleep.    She was tried melatonin, benadryl.    If she gets up to urinate and unable to fall asleep again.    She has tried a number of sleep meds in past.  Doxepin not helpful.  We tried nortriptyline also.   Goes to work at 530.      Exhausted after work.  Often in bed at 7 pm.    Takes periactin for seasonal allergies.      Chronic back pain.    Lidoderm patch not effective, as doesn't stick.    Intolerant of cymbalta - as made her tired.    No edema.    We reviewed BP - elevated today and last clinic visit.    Not taking medication     BMI 36.  Review of Systems   Constitutional:  Positive for fatigue.   Musculoskeletal:  Positive for back pain.   Psychiatric/Behavioral:  Positive for sleep disturbance.           Objective     Physical Exam  Constitutional:       General: She is not in acute distress.     Appearance: Normal appearance. She is obese. She is not ill-appearing, toxic-appearing or diaphoretic.   Musculoskeletal:      Right elbow: No swelling, deformity, effusion or lacerations. Normal range of motion.      Comments: Pain r elbow with shaking my hand   Neurological:      Mental Status: She is alert.            Assessment and Plan     1. Elbow pain, unspecified laterality  -     X-Ray Elbow 2 Views Right; Future; Expected date: 08/04/2025    2. Screening for cardiovascular condition  -     Lipid Panel; Future; Expected date: 08/04/2025    3. Screening for diabetes mellitus  -     Basic Metabolic Panel;  Future; Expected date: 08/04/2025    4. Chronic left-sided low back pain with left-sided sciatica  -     [facility]; Future; Expected date: 08/11/2025    5. Lateral epicondylitis of right elbow    6. Sleep disturbance    7. Elevated blood pressure reading without diagnosis of hypertension          She prefers to start liquid melatonin as opposed to prescription med.    If not effective, try nortriptyline.       Doesn't want to try gabapentin again, as caused headache.    Monitor BP.  Wt loss.    Form completed.    Follow up in about 3 months (around 11/4/2025).

## 2025-08-22 ENCOUNTER — CLINICAL SUPPORT (OUTPATIENT)
Dept: REHABILITATION | Facility: HOSPITAL | Age: 60
End: 2025-08-22
Attending: INTERNAL MEDICINE
Payer: COMMERCIAL

## 2025-08-22 DIAGNOSIS — M25.69 DECREASED RANGE OF MOTION OF TRUNK AND BACK: Primary | ICD-10-CM

## 2025-08-22 DIAGNOSIS — R29.898 DECREASED STRENGTH OF TRUNK AND BACK: ICD-10-CM

## 2025-08-22 PROCEDURE — 97530 THERAPEUTIC ACTIVITIES: CPT

## 2025-08-22 PROCEDURE — 97162 PT EVAL MOD COMPLEX 30 MIN: CPT

## (undated) DEVICE — NDL 18GA X1 1/2 REG BEVEL

## (undated) DEVICE — GAUZE SPONGE 4X4 12PLY

## (undated) DEVICE — SUT MONOCRYL 4-0 UD P-3 18

## (undated) DEVICE — BANDAGE ELASTIC 2X5 VELCRO ST

## (undated) DEVICE — GLOVE BIOGEL PI MICRO INDIC 7

## (undated) DEVICE — SUT 4/0 18IN ETHILON BL P3

## (undated) DEVICE — SLING ARM LARGE FOAM STRAP

## (undated) DEVICE — SYR 10CC LUER LOCK

## (undated) DEVICE — Device

## (undated) DEVICE — SYR B-D DISP CONTROL 10CC100/C

## (undated) DEVICE — BUCKET PLASTER DISPOSABLE

## (undated) DEVICE — GAUZE DERMACEA LOW PLY 3X4YRDS

## (undated) DEVICE — SPLINT PLASTER FAST SET 5X30IN

## (undated) DEVICE — APPLICATOR CHLORAPREP ORN 26ML

## (undated) DEVICE — SUT ETHILON BL MONO P3

## (undated) DEVICE — SUT VICRYL 4-0 18 P-3

## (undated) DEVICE — SEE MEDLINE ITEM 157131

## (undated) DEVICE — GLOVE BIOGEL SKINSENSE PI 7.0

## (undated) DEVICE — HOSE DUAL W/CPC CONNECTORS

## (undated) DEVICE — DRESSING LEUKOPLAST FLEX 1X3IN

## (undated) DEVICE — DRAPE STERI-DRAPE 1000 17X11IN

## (undated) DEVICE — DRESSING N ADH OIL EMUL 3X3

## (undated) DEVICE — SCRUB 10% POVIDONE IODINE 4OZ

## (undated) DEVICE — SEE MEDLINE ITEM 146308

## (undated) DEVICE — CORD BIPOLAR 12 FOOT

## (undated) DEVICE — SOL 9P NACL IRR PIC IL

## (undated) DEVICE — NDL 22GA X1 1/2 REG BEVEL

## (undated) DEVICE — PACK UPPER EXTREMITY BAPTIST

## (undated) DEVICE — PAD CAST SPECIALIST STRL 4